# Patient Record
Sex: FEMALE | Race: BLACK OR AFRICAN AMERICAN | NOT HISPANIC OR LATINO | Employment: UNEMPLOYED | URBAN - METROPOLITAN AREA
[De-identification: names, ages, dates, MRNs, and addresses within clinical notes are randomized per-mention and may not be internally consistent; named-entity substitution may affect disease eponyms.]

---

## 2017-09-14 ENCOUNTER — OFFICE VISIT (OUTPATIENT)
Dept: OBSTETRICS AND GYNECOLOGY | Facility: CLINIC | Age: 22
End: 2017-09-14
Payer: MEDICAID

## 2017-09-14 VITALS
HEIGHT: 69 IN | SYSTOLIC BLOOD PRESSURE: 128 MMHG | BODY MASS INDEX: 30.94 KG/M2 | DIASTOLIC BLOOD PRESSURE: 64 MMHG | WEIGHT: 208.88 LBS

## 2017-09-14 DIAGNOSIS — O09.32: Primary | ICD-10-CM

## 2017-09-14 DIAGNOSIS — A74.9 CHLAMYDIA INFECTION DURING PREGNANCY: ICD-10-CM

## 2017-09-14 DIAGNOSIS — Z32.00 POSSIBLE PREGNANCY: ICD-10-CM

## 2017-09-14 DIAGNOSIS — N91.2 AMENORRHEA: ICD-10-CM

## 2017-09-14 DIAGNOSIS — B00.9 HSV INFECTION: ICD-10-CM

## 2017-09-14 DIAGNOSIS — O98.819 CHLAMYDIA INFECTION DURING PREGNANCY: ICD-10-CM

## 2017-09-14 PROBLEM — O09.30 NO PRENATAL CARE IN CURRENT PREGNANCY: Status: ACTIVE | Noted: 2017-09-14

## 2017-09-14 LAB
B-HCG UR QL: POSITIVE
CTP QC/QA: YES

## 2017-09-14 PROCEDURE — 88175 CYTOPATH C/V AUTO FLUID REDO: CPT

## 2017-09-14 PROCEDURE — 87591 N.GONORRHOEAE DNA AMP PROB: CPT

## 2017-09-14 PROCEDURE — 87088 URINE BACTERIA CULTURE: CPT

## 2017-09-14 PROCEDURE — 99203 OFFICE O/P NEW LOW 30 MIN: CPT | Mod: PBBFAC | Performed by: ADVANCED PRACTICE MIDWIFE

## 2017-09-14 PROCEDURE — 3008F BODY MASS INDEX DOCD: CPT | Mod: ,,, | Performed by: ADVANCED PRACTICE MIDWIFE

## 2017-09-14 PROCEDURE — 81025 URINE PREGNANCY TEST: CPT | Mod: PBBFAC | Performed by: ADVANCED PRACTICE MIDWIFE

## 2017-09-14 PROCEDURE — 99999 PR PBB SHADOW E&M-NEW PATIENT-LVL III: CPT | Mod: PBBFAC,,, | Performed by: ADVANCED PRACTICE MIDWIFE

## 2017-09-14 PROCEDURE — 87086 URINE CULTURE/COLONY COUNT: CPT

## 2017-09-14 PROCEDURE — 87147 CULTURE TYPE IMMUNOLOGIC: CPT

## 2017-09-14 PROCEDURE — 99204 OFFICE O/P NEW MOD 45 MIN: CPT | Mod: TH,S$PBB,, | Performed by: ADVANCED PRACTICE MIDWIFE

## 2017-09-14 NOTE — PROGRESS NOTES
Stephy Spaulding is a 21 y.o. , presents today for amenorrhea.    Has not seen any other provider for this possible pregnancy. She suspects she is around 30 weeks gestation with a possible EDC of 17.  Denies VB, LOF or ctx during this pregnancy. Last outbreak ?      C/C: amenorrhea    HPI: Reports amenorrhea since No LMP recorded..Does not know LMP  States she has been in a state of denial regarding this pregnancy until recently.2 Prior to LMP, menses were  regular occuring every 28 days.  She is not currently on any contraception. + UPT on POS. Also reports nausea occas but not daily vomiting. No  breast tenderness  Breastfeeding toddler. Denies vaginal bleeding since LMP.    Reports no long-term chronic medical conditions     Review of patient's allergies indicates:  No Known Allergies  Past Medical History:   Diagnosis Date    Anemia     since HS  occas    Asthma     childhood    Herpes simplex virus (HSV) infection     found out with last pregnancy     No past surgical history on file.  No past surgical history on file.  OB History    Para Term  AB Living   2 1 1     1   SAB TAB Ectopic Multiple Live Births                  # Outcome Date GA Lbr Dayron/2nd Weight Sex Delivery Anes PTL Lv   2 Term 16 41w0d  3.033 kg (6 lb 11 oz) F Vag-Spont EPI N    1                  OB History      Para Term  AB Living    2 1 1     1    SAB TAB Ectopic Multiple Live Births                     Social History     Social History    Marital status: Single     Spouse name: N/A    Number of children: N/A    Years of education: N/A     Occupational History    Not on file.     Social History Main Topics    Smoking status: Former Smoker     Types: Cigarettes    Smokeless tobacco: Never Used    Alcohol use No      Comment: not with pregnancy    Drug use: No    Sexual activity: Yes     Partners: Male     Other Topics Concern    Not on file     Social History Narrative    No  "narrative on file     Family History   Problem Relation Age of Onset    Emphysema Paternal Grandfather     Restless legs syndrome Maternal Grandmother     Restless legs syndrome Mother     Sickle cell trait Sister      History   Sexual Activity    Sexual activity: Yes    Partners: Male       GENETIC SCREENING   Patient's age 35 years or older as of estimated date of delivery? N  Nural tube defect (meningomyelocele, spina bifida, or anencephaly)? N  Down syndrome? N  Kenny-Sachs (Ashkenazi Adventist, Cajun, Kiswahili [place])? N  Canavan disease (Ashkenazi Adventist)? N  Familial dysautonomia (Ashkenazi Adventist)? N  Sickle cell disease or trait ()? sISTER HAS ss TRAIT  Hemophilia or other blood disorders? N  Cystic fibrosis? N  Muscular dystrophy? N  Lydia's chorea? N  Thalassemia (Italian, Greek, Mediterranean, or  background) MCV less than 80? N  Congenital heart defect? N  Mental retardation/autism? N   If Yes, was person tested for Fragile X? N  Other inherited genetic or chromosomal disorder? Distant Cousin is "little Person"  Maternal metabolic disorder (e.g. type 1 diabetes, PKU)? N  Patient or baby's father had a child with birth defects not listed above? N  Recurrent pregnancy loss or a stillbirth: N  Medications (including supplements, vitamins, herbs or OTC drugs)/illicit/recreational drugs/alcohol since last menstrual period? Bee Pollen folic acid    If yes, agent(s) and strength/dose: n  List any other genetic risks: n  Comments/counseling: N    INFECTION HISTORY  Live with someone with TB or exposed to TB: N  Patient or partner has history of genital herpes: PT   Rash or viral illness since last menstrual period: N  Patient or partner has hepatitis B or C: N  History of STD, gonorrhea, chlamydia, HPV, HIV, syphilis (list all that apply): N  List other infections: N  Additional comments: N         ROS:  WNL  Constitutional/Gen: Denies fevers, chills, malaise, or weight loss or fatigue "   Psych: Denies depression, anxiety  Eyes: Denies changes in vision or scotomata  Ears, nose, mouth, throat: Denies sinus tenderness, swelling, or dentition problems  CV/vasc: Denies heart palpitations or edema  Resp: Denies SOB or dyspnea  Breasts: Denies mass, nipple discharge, or trauma. NO breast tenderness. Lactating  Breastfeeding 3yo  GI: Denies constipation, diarrhea, or vomiting. occa nausea.  : Denies vaginal discharge, dysuria or pelvic pain. no urinary frequency  MS: Denies weakness, soreness, or changes in ROM    OBJECTIVE:  General Appearance:    Alert, cooperative, no distress, appears stated age   Head:    Normocephalic, without obvious abnormality, atraumatic   Eyes:    PERRL, conjunctiva/corneas clear both eyes   Ears:    Normal TM's and external ear canals, both ears   Nose:   Nares normal, septum midline, mucosa normal, no drainage    or sinus tenderness   Throat:   Lips, mucosa, and tongue normal; teeth and gums normal   Neck:   Supple, symmetrical, trachea midline, no adenopathy;     thyroid:  no enlargement/tenderness/nodules   Back:     Symm etric, no curvature, ROM normal, no CVA tenderness   Lungs:     Clear to auscultation bilaterally, respirations unlabored   Chest Wall:    No tenderness or deformity    Heart:    Regular rate and rhythm, S1 and S2 normal, no murmur, rub   or gallop   Breast Exam:    No tenderness, masses, or nipple abnormality lactating   Abdomen:     Soft, non-tender, bowel sounds active all four quadrants,     no masses, no organomegaly +FM   Genitalia:    Normal female without lesion, discharge or tenderness                               Cervix:   Long, closed, non-tender   Uterus:   Enlargement compatible with 30 weeks gestation; regular, mobile and nontende   Adnexa:   Non-tender, without masses bilaterally   Cervix  LTC         Extremities:   Extremities normal, atraumatic, no cyanosis or edema   Pulses:   2+ and symmetric all extremities   Skin:   Skin color,  texture, turgor normal, no rashes or lesions   Lymph nodes:   Cervical WNL   Neurologic:   Normal        UPT POS in office    ASSESSMENT:  21 y.o. female  with amenorrhea  Likely at 30w0d via leopold maneuvers and pt report; S?D unsure dates  Body mass index is 30.85 kg/m².  There is no problem list on file for this patient.    Genetic screening offered and too late  CF offered and declines    PLAN:  Initial OB labs ordered.  Dating ultrasound ordered.  NT or MT21 ordered.  Prenatal vitamins ordered.  Pregnancy education and couseling; handouts and booklet provided and reviewed.  -- She has has not  attended meet and greet and has note toured facility  She will tour [facility] visit  -- Oriented to practice and anticipated prenatal course of care  -- Precautions/warning signs and how to reach us reviewed  -- Common complaints of pregnancy  -- Routine prenatal labs including HIV SS ordered     PAP, Gc/chlam collected  -- Ultrasound scheduled with M  -- Childbirth education/hospital/[facility] facilities  -- Nutrition and food safety  -- Sexual activity and exercise  -- Environmental/Work hazards  -- Travel  -- Tobacco (Ask, Advise, Assess, Assist, and Arrange), as well as alcohol and drug use  N/A  -- Use of any medications (Including supplements, Vitamins, Herbs, or OTC Drugs)  Will start PNvitamins    RTC x 2 weeks, call or present sooner prn.   Joyce Lees CNM

## 2017-09-15 ENCOUNTER — LAB VISIT (OUTPATIENT)
Dept: LAB | Facility: OTHER | Age: 22
End: 2017-09-15
Attending: ADVANCED PRACTICE MIDWIFE
Payer: MEDICAID

## 2017-09-15 DIAGNOSIS — O09.32: ICD-10-CM

## 2017-09-15 DIAGNOSIS — Z32.00 POSSIBLE PREGNANCY: ICD-10-CM

## 2017-09-15 LAB
ABO + RH BLD: NORMAL
ANISOCYTOSIS BLD QL SMEAR: SLIGHT
BASOPHILS # BLD AUTO: 0.01 K/UL
BASOPHILS NFR BLD: 0.1 %
BLD GP AB SCN CELLS X3 SERPL QL: NORMAL
C TRACH DNA SPEC QL NAA+PROBE: DETECTED
DIFFERENTIAL METHOD: ABNORMAL
EOSINOPHIL # BLD AUTO: 0.1 K/UL
EOSINOPHIL NFR BLD: 0.9 %
ERYTHROCYTE [DISTWIDTH] IN BLOOD BY AUTOMATED COUNT: 16.2 %
GIANT PLATELETS BLD QL SMEAR: PRESENT
GLUCOSE SERPL-MCNC: 87 MG/DL
HCT VFR BLD AUTO: 30.4 %
HGB BLD-MCNC: 9.6 G/DL
HGB S BLD QL SOLY: NEGATIVE
HYPOCHROMIA BLD QL SMEAR: ABNORMAL
LYMPHOCYTES # BLD AUTO: 2.3 K/UL
LYMPHOCYTES NFR BLD: 23.6 %
MCH RBC QN AUTO: 21.3 PG
MCHC RBC AUTO-ENTMCNC: 31.6 G/DL
MCV RBC AUTO: 68 FL
MONOCYTES # BLD AUTO: 0.5 K/UL
MONOCYTES NFR BLD: 4.8 %
N GONORRHOEA DNA SPEC QL NAA+PROBE: NOT DETECTED
NEUTROPHILS # BLD AUTO: 7 K/UL
NEUTROPHILS NFR BLD: 70.6 %
OVALOCYTES BLD QL SMEAR: ABNORMAL
PLATELET # BLD AUTO: 359 K/UL
PLATELET BLD QL SMEAR: ABNORMAL
PMV BLD AUTO: 9.4 FL
POIKILOCYTOSIS BLD QL SMEAR: ABNORMAL
POLYCHROMASIA BLD QL SMEAR: ABNORMAL
RBC # BLD AUTO: 4.5 M/UL
SPHEROCYTES BLD QL SMEAR: ABNORMAL
WBC # BLD AUTO: 9.9 K/UL

## 2017-09-15 PROCEDURE — 86900 BLOOD TYPING SEROLOGIC ABO: CPT

## 2017-09-15 PROCEDURE — 82950 GLUCOSE TEST: CPT

## 2017-09-15 PROCEDURE — 87340 HEPATITIS B SURFACE AG IA: CPT

## 2017-09-15 PROCEDURE — 85025 COMPLETE CBC W/AUTO DIFF WBC: CPT

## 2017-09-15 PROCEDURE — 86592 SYPHILIS TEST NON-TREP QUAL: CPT

## 2017-09-15 PROCEDURE — 36415 COLL VENOUS BLD VENIPUNCTURE: CPT

## 2017-09-15 PROCEDURE — 86762 RUBELLA ANTIBODY: CPT

## 2017-09-15 PROCEDURE — 86703 HIV-1/HIV-2 1 RESULT ANTBDY: CPT

## 2017-09-15 PROCEDURE — 86901 BLOOD TYPING SEROLOGIC RH(D): CPT

## 2017-09-15 PROCEDURE — 85660 RBC SICKLE CELL TEST: CPT

## 2017-09-16 LAB — BACTERIA UR CULT: NORMAL

## 2017-09-18 LAB
HBV SURFACE AG SERPL QL IA: NEGATIVE
HIV 1+2 AB+HIV1 P24 AG SERPL QL IA: NEGATIVE
RPR SER QL: NORMAL
RUBV IGG SER-ACNC: 85.1 IU/ML
RUBV IGG SER-IMP: REACTIVE

## 2017-09-21 ENCOUNTER — TELEPHONE (OUTPATIENT)
Dept: OBSTETRICS AND GYNECOLOGY | Facility: CLINIC | Age: 22
End: 2017-09-21

## 2017-09-21 PROBLEM — A74.9 CHLAMYDIA INFECTION DURING PREGNANCY: Status: ACTIVE | Noted: 2017-09-21

## 2017-09-21 PROBLEM — O98.819 CHLAMYDIA INFECTION DURING PREGNANCY: Status: ACTIVE | Noted: 2017-09-21

## 2017-09-21 NOTE — TELEPHONE ENCOUNTER
TC to Pt  Informed of POS Chlamydia  RX called to pharmacy  Instructed to take half and give  half.  agrees

## 2017-09-25 ENCOUNTER — ROUTINE PRENATAL (OUTPATIENT)
Dept: OBSTETRICS AND GYNECOLOGY | Facility: CLINIC | Age: 22
End: 2017-09-25
Payer: MEDICAID

## 2017-09-25 VITALS — SYSTOLIC BLOOD PRESSURE: 120 MMHG | WEIGHT: 209 LBS | BODY MASS INDEX: 30.86 KG/M2 | DIASTOLIC BLOOD PRESSURE: 70 MMHG

## 2017-09-25 DIAGNOSIS — Z34.00: Primary | ICD-10-CM

## 2017-09-25 DIAGNOSIS — O99.019 ANEMIA IN PREG-UNSPEC: ICD-10-CM

## 2017-09-25 DIAGNOSIS — Z78.9 BREASTFEEDING (INFANT): ICD-10-CM

## 2017-09-25 DIAGNOSIS — A60.9 HSV (HERPES SIMPLEX VIRUS) ANOGENITAL INFECTION: Primary | ICD-10-CM

## 2017-09-25 DIAGNOSIS — O23.43 GBS (GROUP B STREPTOCOCCUS) UTI COMPLICATING PREGNANCY, THIRD TRIMESTER: ICD-10-CM

## 2017-09-25 DIAGNOSIS — B95.1 GBS (GROUP B STREPTOCOCCUS) UTI COMPLICATING PREGNANCY, THIRD TRIMESTER: ICD-10-CM

## 2017-09-25 PROBLEM — O23.40 GBS (GROUP B STREPTOCOCCUS) UTI COMPLICATING PREGNANCY: Status: ACTIVE | Noted: 2017-09-25

## 2017-09-25 PROBLEM — R79.89 ELEVATED PLATELET COUNT: Status: ACTIVE | Noted: 2017-09-25

## 2017-09-25 PROCEDURE — 99999 PR PBB SHADOW E&M-EST. PATIENT-LVL II: CPT | Mod: PBBFAC,,, | Performed by: ADVANCED PRACTICE MIDWIFE

## 2017-09-25 PROCEDURE — 99212 OFFICE O/P EST SF 10 MIN: CPT | Mod: PBBFAC | Performed by: ADVANCED PRACTICE MIDWIFE

## 2017-09-25 PROCEDURE — 3008F BODY MASS INDEX DOCD: CPT | Mod: ,,, | Performed by: ADVANCED PRACTICE MIDWIFE

## 2017-09-25 PROCEDURE — 99212 OFFICE O/P EST SF 10 MIN: CPT | Mod: TH,S$PBB,, | Performed by: ADVANCED PRACTICE MIDWIFE

## 2017-09-25 RX ORDER — FERROUS SULFATE 325(65) MG
325 TABLET ORAL DAILY
Qty: 30 TABLET | Refills: 3 | Status: SHIPPED | OUTPATIENT
Start: 2017-09-25 | End: 2018-09-25

## 2017-09-25 RX ORDER — VALACYCLOVIR HYDROCHLORIDE 500 MG/1
500 TABLET, FILM COATED ORAL 2 TIMES DAILY
Qty: 10 TABLET | Refills: 3 | Status: SHIPPED | OUTPATIENT
Start: 2017-09-25 | End: 2020-01-08 | Stop reason: SDUPTHER

## 2017-09-25 RX ORDER — AMOXICILLIN 500 MG/1
500 CAPSULE ORAL EVERY 12 HOURS
Qty: 14 CAPSULE | Refills: 0 | Status: SHIPPED | OUTPATIENT
Start: 2017-09-25 | End: 2017-10-02

## 2017-09-26 NOTE — PROGRESS NOTES
"21 y.o. female  at 31wks (poor dating/late prenatal care)  Reports + FM, denies VB, LOF or CTX  Brings her daughter John today, still breastfeeding her  Poor dating  -- Uncertain of LMP; "best guess is middle of February"  -- Ensured she has US with MFM asap; encouraged to keep appt  Late prenatal care  -- First visit  at ? 29 wks  + CT  -- States got rx from Ankita Lees CNM and completed it about 3-4 days ago  -- States she informed her parter Eric and encouraged him to get tested/get treated  -- Encouraged her to abstain from any sexual activity until both partners test negative  -- Reviewed maternal and fetal risks of untreated infection  -- Reviewed that we would perform a SHAE in ~ 4 weeks from completion of treatment and again at ~ 36 wks   -- Stephy states understanding and agrees  Excessive weight gain  -- TWG of 44# for pre preg BMI of 24  -- Reviewed with her to make sure pre preg weight was accurate  GBS uria  -- [place] count > 100,000   -- Reviewed UpToDate; she is candidate for treatment now; rx amoxicillin   -- Will need urine SHAE  -- Will need intrapartum abx  -- Reviewed all of this with Stephy; she states understanding and agrees  HSV  -- No current sx  -- Provided with prn rx of valtrex  -- Reviewed starting suppressive meds at 36 wks; she states understanding and agrees  Anemia   -- Fe supplement provided; encouraged Fe rich foods    Reviewed lab results (AB POS)  Declines tdap and flu vaccines today  Multiple issues - will bring to next TRAVIS/MD mtg for discussion  Reviewed warning signs, normal FKCs,  labor precautions and how/when to call.  RTC x 2 wks, call or present sooner prn.       "

## 2017-10-03 ENCOUNTER — OFFICE VISIT (OUTPATIENT)
Dept: MATERNAL FETAL MEDICINE | Facility: CLINIC | Age: 22
End: 2017-10-03
Payer: MEDICAID

## 2017-10-03 DIAGNOSIS — Z36.89 ENCOUNTER FOR FETAL ANATOMIC SURVEY: Primary | ICD-10-CM

## 2017-10-03 DIAGNOSIS — O09.33 LATE PRENATAL CARE AFFECTING PREGNANCY IN THIRD TRIMESTER: ICD-10-CM

## 2017-10-03 DIAGNOSIS — Z36.89 ENCOUNTER FOR ULTRASOUND TO CHECK FETAL GROWTH: ICD-10-CM

## 2017-10-03 PROCEDURE — 76805 OB US >/= 14 WKS SNGL FETUS: CPT | Mod: PBBFAC | Performed by: OBSTETRICS & GYNECOLOGY

## 2017-10-03 PROCEDURE — 76805 OB US >/= 14 WKS SNGL FETUS: CPT | Mod: 26,S$PBB,, | Performed by: OBSTETRICS & GYNECOLOGY

## 2017-10-03 PROCEDURE — 99499 UNLISTED E&M SERVICE: CPT | Mod: S$PBB,,, | Performed by: OBSTETRICS & GYNECOLOGY

## 2017-10-03 NOTE — PROGRESS NOTES
OB Ultrasound Report (see PDF version under imaging tab)    Indication  ========    Fetal anatomy survey.    Method  ======    Transabdominal ultrasound examination.    Pregnancy  =========    Lacy pregnancy. Number of fetuses: 1.    Dating  ======    LMP on: 2/18/2017  Cycle: regular cycle  GA by LMP 32 w + 3 d  HAMZAH by LMP: 11/25/2017  Ultrasound examination on: 10/3/2017  GA by U/S based upon: AC, BPD, Femur, HC  GA by U/S 34 w + 1 d  HAMZAH by U/S: 11/13/2017  Assigned: Dating performed on 10/3/2017, based on the LMP  Assigned GA 32 w + 3 d  Assigned HAMZAH: 11/25/2017    General Evaluation  ===============    Cardiac activity: present.  bpm.  Fetal movements: visualized.  Presentation: cephalic.  Placenta: posterior.  Umbilical cord: normal, 3 vessel cord.  Amniotic fluid: normal amount.    Fetal Biometry  ===========    Fetal Biometry  BPD 86.5 mm 34w 6d Hadlock  .3 mm 36w 1d Mona  .0 mm 35w 1d Hadlock  .4 mm 31w 6d Hadlock  Femur 67.2 mm 34w 4d Hadlock  Humerus 58.4 mm 33w 6d Mona  EFW 2,163 g 40% Archie  Calculated by: Hadlock (BPD-HC-AC-FL)  EFW (lb) 4 lb  EFW (oz) 12 oz  Cephalic index 0.79  HC / AC 1.13  FL / BPD 0.78  FL / AC 0.24  MVP 4.6 cm   bpm  Head / Face / Neck   4.0 mm    Fetal Anatomy  ===========    Cranium: normal  Lateral ventricles: normal  Choroid plexus: normal  Midline falx: normal  Cavum septi pellucidi: suboptimal  Cerebellum: normal  Cisterna magna: normal  Rt lateral ventricle: normal  Lt lateral ventricle: normal  Rt choroid plexus: normal  Lt choroid plexus: normal  Lips: suboptimal  Profile: suboptimal  Nose: suboptimal  4-chamber view: normal  RVOT: normal  LVOT: suboptimal  Situs: normal  Aortic arch: normal  Cardiac position: normal  Cardiac axis: normal  Cardiac size: normal  Cardiac rhythm: normal  Rt lung: normal  Lt lung: normal  Diaphragm: normal  Cord insertion: suboptimal  Stomach: normal  Kidneys: normal  Bladder: normal  Abdom.  wall: appears normal  Abdom. cavity: normal  Rt kidney: normal  Lt kidney: normal  Cervical spine: normal  Thoracic spine: normal  Lumbar spine: normal  Sacral spine: normal  Arms: normal  Legs: normal  Rt arm: visualized  Lt arm: visualized  Rt hand: visualized  Lt hand: visualized  Rt leg: visualized  Lt leg: visualized  Rt foot: visualized  Lt foot: visualized  Position of hands: suboptimal  Position of feet: suboptimal  Gender: female  Wants to know gender: yes    Maternal Structures  ===============    Uterus / Cervix  Uterus: Normal  Ovaries / Tubes / Adnexa  Rt ovary: Not visualized  Lt ovary: Not visualized    Impression  =========    A ingram living IUP is identified. Fetal size is appropriate for menstrual dating. No fetal structural malformations are identified;  however, the anatomic survey is incomplete as noted above. The study is limited due to late gestation and fetal position. The patient will  be scheduled for a f/u exam to attempt to complete the anatomic survey. Placental location is normal without evidence of previa.

## 2017-10-09 ENCOUNTER — ROUTINE PRENATAL (OUTPATIENT)
Dept: OBSTETRICS AND GYNECOLOGY | Facility: CLINIC | Age: 22
End: 2017-10-09
Payer: MEDICAID

## 2017-10-09 VITALS
BODY MASS INDEX: 32.23 KG/M2 | SYSTOLIC BLOOD PRESSURE: 124 MMHG | DIASTOLIC BLOOD PRESSURE: 78 MMHG | WEIGHT: 218.25 LBS

## 2017-10-09 DIAGNOSIS — Z34.93 PRENATAL CARE IN THIRD TRIMESTER: Primary | ICD-10-CM

## 2017-10-09 DIAGNOSIS — R76.8 HSV-2 SEROPOSITIVE: ICD-10-CM

## 2017-10-09 DIAGNOSIS — O98.819 CHLAMYDIA INFECTION DURING PREGNANCY: ICD-10-CM

## 2017-10-09 DIAGNOSIS — A74.9 CHLAMYDIA INFECTION DURING PREGNANCY: ICD-10-CM

## 2017-10-09 PROCEDURE — 99999 PR PBB SHADOW E&M-EST. PATIENT-LVL II: CPT | Mod: PBBFAC,,, | Performed by: ADVANCED PRACTICE MIDWIFE

## 2017-10-09 PROCEDURE — 99213 OFFICE O/P EST LOW 20 MIN: CPT | Mod: TH,S$PBB,, | Performed by: ADVANCED PRACTICE MIDWIFE

## 2017-10-09 PROCEDURE — 99212 OFFICE O/P EST SF 10 MIN: CPT | Mod: PBBFAC | Performed by: ADVANCED PRACTICE MIDWIFE

## 2017-10-09 RX ORDER — AZITHROMYCIN 500 MG/1
TABLET, FILM COATED ORAL
Status: ON HOLD | COMMUNITY
Start: 2017-09-21 | End: 2017-11-24 | Stop reason: HOSPADM

## 2017-10-09 NOTE — PROGRESS NOTES
21 y.o. female  at 33w2d by   Reports + FM, denies VB, LOF or CTX  Doing well    TW lbs uncertain of pre-pregnant weight, discussed weight and diet. Unable to deliver in ABC if weight gain excessive.  Reviewed upcoming 36wk labs , CT, RPR, HIV   Discussed pos GBS and need for IP prophylaxis  Reviewed warning signs, normal FKCs,  labor precautions and how/when to call.  RTC x 2 wks, call or present sooner prn.   hsv seropositive only, no hx lesions.  Discussed limited evidence re: utility of serological testing or treatment based on that testing.  She will use Valtrex prophylacticaly as told previously that she needs to.

## 2017-10-14 ENCOUNTER — DOCUMENTATION ONLY (OUTPATIENT)
Dept: OBSTETRICS AND GYNECOLOGY | Facility: HOSPITAL | Age: 22
End: 2017-10-14

## 2017-10-14 PROBLEM — R63.5 EXCESSIVE WEIGHT GAIN: Status: ACTIVE | Noted: 2017-10-14

## 2017-10-15 NOTE — PROGRESS NOTES
Per TRAVIS/MD mtg 10/12/17    Late prenatal care (3T)  Excessive weight gain    Plan for delivery on L&D    Bruna Ramos CNM/NP  Certified Nurse Midwife/Nurse Practitioner  10/14/2017 7:36 PM

## 2017-10-26 ENCOUNTER — LAB VISIT (OUTPATIENT)
Dept: LAB | Facility: OTHER | Age: 22
End: 2017-10-26
Attending: ADVANCED PRACTICE MIDWIFE
Payer: MEDICAID

## 2017-10-26 ENCOUNTER — ROUTINE PRENATAL (OUTPATIENT)
Dept: OBSTETRICS AND GYNECOLOGY | Facility: CLINIC | Age: 22
End: 2017-10-26
Payer: MEDICAID

## 2017-10-26 VITALS — DIASTOLIC BLOOD PRESSURE: 80 MMHG | WEIGHT: 222.13 LBS | SYSTOLIC BLOOD PRESSURE: 116 MMHG | BODY MASS INDEX: 32.8 KG/M2

## 2017-10-26 DIAGNOSIS — A74.9 CHLAMYDIA INFECTION DURING PREGNANCY: ICD-10-CM

## 2017-10-26 DIAGNOSIS — Z34.93 PRENATAL CARE IN THIRD TRIMESTER: ICD-10-CM

## 2017-10-26 DIAGNOSIS — O98.819 CHLAMYDIA INFECTION DURING PREGNANCY: ICD-10-CM

## 2017-10-26 DIAGNOSIS — O23.43 GROUP B STREPTOCOCCUS URINARY TRACT INFECTION AFFECTING PREGNANCY IN THIRD TRIMESTER: Primary | ICD-10-CM

## 2017-10-26 DIAGNOSIS — B95.1 GROUP B STREPTOCOCCUS URINARY TRACT INFECTION AFFECTING PREGNANCY IN THIRD TRIMESTER: Primary | ICD-10-CM

## 2017-10-26 LAB
ACANTHOCYTES BLD QL SMEAR: PRESENT
ANISOCYTOSIS BLD QL SMEAR: SLIGHT
BASOPHILS # BLD AUTO: 0.02 K/UL
BASOPHILS NFR BLD: 0.2 %
DIFFERENTIAL METHOD: ABNORMAL
EOSINOPHIL # BLD AUTO: 0.1 K/UL
EOSINOPHIL NFR BLD: 0.6 %
ERYTHROCYTE [DISTWIDTH] IN BLOOD BY AUTOMATED COUNT: 16.7 %
GIANT PLATELETS BLD QL SMEAR: PRESENT
HCT VFR BLD AUTO: 30.5 %
HGB BLD-MCNC: 9.6 G/DL
LYMPHOCYTES # BLD AUTO: 2.7 K/UL
LYMPHOCYTES NFR BLD: 26.9 %
MCH RBC QN AUTO: 20.6 PG
MCHC RBC AUTO-ENTMCNC: 31.5 G/DL
MCV RBC AUTO: 66 FL
MONOCYTES # BLD AUTO: 0.5 K/UL
MONOCYTES NFR BLD: 5.3 %
NEUTROPHILS # BLD AUTO: 6.6 K/UL
NEUTROPHILS NFR BLD: 67 %
OVALOCYTES BLD QL SMEAR: ABNORMAL
PLATELET # BLD AUTO: 354 K/UL
PLATELET BLD QL SMEAR: ABNORMAL
PMV BLD AUTO: 9.3 FL
POIKILOCYTOSIS BLD QL SMEAR: SLIGHT
POLYCHROMASIA BLD QL SMEAR: ABNORMAL
RBC # BLD AUTO: 4.65 M/UL
SCHISTOCYTES BLD QL SMEAR: ABNORMAL
WBC # BLD AUTO: 9.94 K/UL

## 2017-10-26 PROCEDURE — 99213 OFFICE O/P EST LOW 20 MIN: CPT | Mod: TH,S$PBB,, | Performed by: ADVANCED PRACTICE MIDWIFE

## 2017-10-26 PROCEDURE — 99999 PR PBB SHADOW E&M-EST. PATIENT-LVL II: CPT | Mod: PBBFAC,,, | Performed by: ADVANCED PRACTICE MIDWIFE

## 2017-10-26 PROCEDURE — 36415 COLL VENOUS BLD VENIPUNCTURE: CPT

## 2017-10-26 PROCEDURE — 86703 HIV-1/HIV-2 1 RESULT ANTBDY: CPT

## 2017-10-26 PROCEDURE — 87086 URINE CULTURE/COLONY COUNT: CPT

## 2017-10-26 PROCEDURE — 99212 OFFICE O/P EST SF 10 MIN: CPT | Mod: PBBFAC | Performed by: ADVANCED PRACTICE MIDWIFE

## 2017-10-26 PROCEDURE — 86592 SYPHILIS TEST NON-TREP QUAL: CPT

## 2017-10-26 PROCEDURE — 87591 N.GONORRHOEAE DNA AMP PROB: CPT

## 2017-10-26 PROCEDURE — 85025 COMPLETE CBC W/AUTO DIFF WBC: CPT

## 2017-10-26 NOTE — PROGRESS NOTES
GBS bacteruria this pregnancy; she knows we will treat with IV antibiotics during labor. Was treated during pregnancy since the culture was >100,000 colony count but no SHAE on record. Will collect today.  SHAE CT today.  Planning on avoiding epidural. Discussed coping mechanisms during labor.  Showed me her eugene which documented a weight of 202# at about 5 weeks gestation with a visit to a clinic that confirmed her pregnancy only. States a [facility] nurse showed her how to use a eugene to access this information. This weight would give her a prepregnancy BMI of 29.8 and a TWG of 20#. She is still not eligible for [facility] care since she did not establish prenatal care until almost 30 weeks gestation. (This was clarified with the patient per phone conversation after she left her prenatal visit.)  To get blood work of CBC, RPR, and HIV today.  History seropositive HSV with no history of a lesion discussed. Valtrex not needed per our protocol and UpToDate.  Warning signs of 3T, S&S PTL and FMR protocol reviewed.  Expectation for hospital stay reviewed

## 2017-10-27 LAB
C TRACH DNA SPEC QL NAA+PROBE: NOT DETECTED
HIV 1+2 AB+HIV1 P24 AG SERPL QL IA: NEGATIVE
N GONORRHOEA DNA SPEC QL NAA+PROBE: NOT DETECTED
RPR SER QL: NORMAL

## 2017-10-28 LAB — BACTERIA UR CULT: NORMAL

## 2017-10-31 ENCOUNTER — OFFICE VISIT (OUTPATIENT)
Dept: MATERNAL FETAL MEDICINE | Facility: CLINIC | Age: 22
End: 2017-10-31
Payer: MEDICAID

## 2017-10-31 DIAGNOSIS — Z36.89 ENCOUNTER FOR ULTRASOUND TO CHECK FETAL GROWTH: ICD-10-CM

## 2017-10-31 PROCEDURE — 99499 UNLISTED E&M SERVICE: CPT | Mod: S$PBB,,, | Performed by: OBSTETRICS & GYNECOLOGY

## 2017-10-31 PROCEDURE — 76816 OB US FOLLOW-UP PER FETUS: CPT | Mod: PBBFAC | Performed by: OBSTETRICS & GYNECOLOGY

## 2017-10-31 PROCEDURE — 76816 OB US FOLLOW-UP PER FETUS: CPT | Mod: 26,S$PBB,, | Performed by: OBSTETRICS & GYNECOLOGY

## 2017-10-31 NOTE — PROGRESS NOTES
OB Ultrasound Report (see PDF version under imaging tab)    Indication  ========    Evaluation of fetal growth/finish anatomy.    Method  ======    Transabdominal ultrasound examination.    Pregnancy  =========    Lacy pregnancy. Number of fetuses: 1.    Dating  ======    LMP on: 2/18/2017  Cycle: regular cycle  GA by LMP 36 w + 3 d  HAMZAH by LMP: 11/25/2017  Ultrasound examination on: 10/31/2017  GA by U/S based upon: AC, BPD, Femur, HC  GA by U/S 37 w + 1 d  HAMZAH by U/S: 11/20/2017  Assigned: Dating performed on 10/3/2017, based on the LMP  Assigned GA 36 w + 3 d  Assigned HAMZAH: 11/25/2017    General Evaluation  ===============    Cardiac activity: present.  bpm.  Fetal movements: visualized.  Presentation: cephalic.  Placenta:  Placental site: posterior.  Umbilical cord: Cord vessels: 3 vessel cord.  Amniotic fluid: MVP 5.0 cm.    Fetal Biometry  ===========    Fetal Biometry  BPD 94.2 mm 38w 3d Hadlock  .9 mm 39w 0d Mona  .5 mm 38w 0d Hadlock  .1 mm 35w 4d Hadlock  Femur 72.0 mm 36w 6d Hadlock  EFW 2,942 g 42% Archie  Calculated by: Hadlock (BPD-HC-AC-FL)  EFW (lb) 6 lb  EFW (oz) 8 oz  Cephalic index 0.83  HC / AC 1.05  FL / BPD 0.76  FL / AC 0.23  MVP 5.0 cm   bpm    Fetal Anatomy  ===========    Cranium: normal  Cavum septi pellucidi: normal  Posterior fossa: normal  Lips: normal  Profile: suboptimal  Nose: normal  Profile: nasal bone seen  4-chamber view: documented previously  LVOT: suboptimal  Heart / Thorax: septum is sub opt  Cord insertion: suboptimal  Stomach: normal  Kidneys: normal  Bladder: normal  Rt foot: suboptimal  Lt foot: suboptimal  Position of feet: suboptimal  Gender: female  Wants to know gender: yes  Other: A full anatomy survey previously performed.    Impression  =========    The fetal anatomic survey remains limited as noted above due to fetal position and gestational age. Interval fetal growth has been  normal with the EFW plotting at the 42nd  percentile.    Recommendation  ==============    Repeat ultrasound study as clinically indicated.

## 2017-11-03 ENCOUNTER — TELEPHONE (OUTPATIENT)
Dept: OBSTETRICS AND GYNECOLOGY | Facility: HOSPITAL | Age: 22
End: 2017-11-03

## 2017-11-09 ENCOUNTER — ROUTINE PRENATAL (OUTPATIENT)
Dept: OBSTETRICS AND GYNECOLOGY | Facility: CLINIC | Age: 22
End: 2017-11-09
Payer: MEDICAID

## 2017-11-09 VITALS
WEIGHT: 228.31 LBS | DIASTOLIC BLOOD PRESSURE: 78 MMHG | SYSTOLIC BLOOD PRESSURE: 126 MMHG | BODY MASS INDEX: 33.71 KG/M2

## 2017-11-09 DIAGNOSIS — Z34.93 PRENATAL CARE IN THIRD TRIMESTER: Primary | ICD-10-CM

## 2017-11-09 PROCEDURE — 99999 PR PBB SHADOW E&M-EST. PATIENT-LVL II: CPT | Mod: PBBFAC,,, | Performed by: ADVANCED PRACTICE MIDWIFE

## 2017-11-09 PROCEDURE — 99212 OFFICE O/P EST SF 10 MIN: CPT | Mod: PBBFAC | Performed by: ADVANCED PRACTICE MIDWIFE

## 2017-11-09 PROCEDURE — 99212 OFFICE O/P EST SF 10 MIN: CPT | Mod: TH,S$PBB,, | Performed by: ADVANCED PRACTICE MIDWIFE

## 2017-11-09 NOTE — PROGRESS NOTES
21 y.o. female  at 37w5d by LMP   Reports + FM, denies VB, LOF or regular CTX  GBS POS - needs intrapartum abx  Anemia - taking Fe  Hx CT - recent SHAE negative  Hx UTI - recent SHAE negative  Repeat HIV/RPR neg/neg  Reviewed warning signs, normal FKCs, labor precautions and how/when to call.  RTC x 1 wks, call or present sooner prn.

## 2017-11-16 ENCOUNTER — ROUTINE PRENATAL (OUTPATIENT)
Dept: OBSTETRICS AND GYNECOLOGY | Facility: CLINIC | Age: 22
End: 2017-11-16
Payer: MEDICAID

## 2017-11-16 VITALS
SYSTOLIC BLOOD PRESSURE: 128 MMHG | WEIGHT: 229.25 LBS | DIASTOLIC BLOOD PRESSURE: 72 MMHG | BODY MASS INDEX: 33.86 KG/M2

## 2017-11-16 DIAGNOSIS — Z34.93 PRENATAL CARE IN THIRD TRIMESTER: Primary | ICD-10-CM

## 2017-11-16 PROCEDURE — 99213 OFFICE O/P EST LOW 20 MIN: CPT | Mod: TH,S$PBB,, | Performed by: ADVANCED PRACTICE MIDWIFE

## 2017-11-16 PROCEDURE — 99999 PR PBB SHADOW E&M-EST. PATIENT-LVL III: CPT | Mod: PBBFAC,,, | Performed by: ADVANCED PRACTICE MIDWIFE

## 2017-11-16 PROCEDURE — 99213 OFFICE O/P EST LOW 20 MIN: CPT | Mod: PBBFAC | Performed by: ADVANCED PRACTICE MIDWIFE

## 2017-11-16 NOTE — PROGRESS NOTES
21 y.o. female  at 38w5d   Doing well    TW lbs   Reviewed GBS pos  Reviewed repeat HIV/RPR neg  Taking Valtrex,  Reviewed warning signs, normal FKCs, labor precautions and how/when to call.  RTC x 1 wks, call or present sooner prn.

## 2017-11-23 ENCOUNTER — HOSPITAL ENCOUNTER (INPATIENT)
Facility: OTHER | Age: 22
LOS: 2 days | Discharge: HOME | End: 2017-11-25
Attending: OBSTETRICS & GYNECOLOGY | Admitting: OBSTETRICS & GYNECOLOGY
Payer: MEDICAID

## 2017-11-23 PROBLEM — O99.013 ANEMIA AFFECTING PREGNANCY IN THIRD TRIMESTER: Status: ACTIVE | Noted: 2017-09-25

## 2017-11-23 PROBLEM — R79.89 ELEVATED PLATELET COUNT: Status: RESOLVED | Noted: 2017-09-25 | Resolved: 2017-11-23

## 2017-11-23 LAB
ABO + RH BLD: NORMAL
BASOPHILS # BLD AUTO: 0.01 K/UL
BASOPHILS NFR BLD: 0.1 %
BLD GP AB SCN CELLS X3 SERPL QL: NORMAL
DIFFERENTIAL METHOD: ABNORMAL
EOSINOPHIL # BLD AUTO: 0.1 K/UL
EOSINOPHIL NFR BLD: 1 %
ERYTHROCYTE [DISTWIDTH] IN BLOOD BY AUTOMATED COUNT: 17.7 %
HCT VFR BLD AUTO: 30.3 %
HGB BLD-MCNC: 9.4 G/DL
LYMPHOCYTES # BLD AUTO: 3 K/UL
LYMPHOCYTES NFR BLD: 25.8 %
MCH RBC QN AUTO: 19.8 PG
MCHC RBC AUTO-ENTMCNC: 31 G/DL
MCV RBC AUTO: 64 FL
MONOCYTES # BLD AUTO: 0.7 K/UL
MONOCYTES NFR BLD: 5.6 %
NEUTROPHILS # BLD AUTO: 7.9 K/UL
NEUTROPHILS NFR BLD: 67.5 %
PLATELET # BLD AUTO: 340 K/UL
PMV BLD AUTO: 9.4 FL
RBC # BLD AUTO: 4.75 M/UL
WBC # BLD AUTO: 11.8 K/UL

## 2017-11-23 PROCEDURE — 25000003 PHARM REV CODE 250: Performed by: ADVANCED PRACTICE MIDWIFE

## 2017-11-23 PROCEDURE — 11000001 HC ACUTE MED/SURG PRIVATE ROOM

## 2017-11-23 PROCEDURE — 59409 OBSTETRICAL CARE: CPT | Mod: GB,,, | Performed by: ADVANCED PRACTICE MIDWIFE

## 2017-11-23 PROCEDURE — 72100002 HC LABOR CARE, 1ST 8 HOURS

## 2017-11-23 PROCEDURE — 86900 BLOOD TYPING SEROLOGIC ABO: CPT

## 2017-11-23 PROCEDURE — 72200004 HC VAGINAL DELIVERY LEVEL I

## 2017-11-23 PROCEDURE — 85025 COMPLETE CBC W/AUTO DIFF WBC: CPT

## 2017-11-23 PROCEDURE — 86850 RBC ANTIBODY SCREEN: CPT

## 2017-11-23 PROCEDURE — 99285 EMERGENCY DEPT VISIT HI MDM: CPT

## 2017-11-23 PROCEDURE — 99283 EMERGENCY DEPT VISIT LOW MDM: CPT | Mod: ,,, | Performed by: OBSTETRICS & GYNECOLOGY

## 2017-11-23 PROCEDURE — 63600175 PHARM REV CODE 636 W HCPCS: Performed by: ADVANCED PRACTICE MIDWIFE

## 2017-11-23 RX ORDER — METHYLERGONOVINE MALEATE 0.2 MG/ML
200 INJECTION INTRAVENOUS
Status: DISCONTINUED | OUTPATIENT
Start: 2017-11-23 | End: 2017-11-24

## 2017-11-23 RX ORDER — OXYTOCIN/RINGER'S LACTATE 20/1000 ML
41.65 PLASTIC BAG, INJECTION (ML) INTRAVENOUS CONTINUOUS
Status: DISPENSED | OUTPATIENT
Start: 2017-11-23 | End: 2017-11-23

## 2017-11-23 RX ORDER — OXYTOCIN/RINGER'S LACTATE 20/1000 ML
2 PLASTIC BAG, INJECTION (ML) INTRAVENOUS CONTINUOUS
Status: DISCONTINUED | OUTPATIENT
Start: 2017-11-23 | End: 2017-11-24

## 2017-11-23 RX ORDER — PRENATAL WITH FERROUS FUM AND FOLIC ACID 3080; 920; 120; 400; 22; 1.84; 3; 20; 10; 1; 12; 200; 27; 25; 2 [IU]/1; [IU]/1; MG/1; [IU]/1; MG/1; MG/1; MG/1; MG/1; MG/1; MG/1; UG/1; MG/1; MG/1; MG/1; MG/1
1 TABLET ORAL DAILY
Status: CANCELLED | OUTPATIENT
Start: 2017-11-23

## 2017-11-23 RX ORDER — MISOPROSTOL 200 UG/1
200 TABLET ORAL
Status: DISCONTINUED | OUTPATIENT
Start: 2017-11-23 | End: 2017-11-24

## 2017-11-23 RX ORDER — ONDANSETRON 8 MG/1
8 TABLET, ORALLY DISINTEGRATING ORAL EVERY 8 HOURS PRN
Status: DISCONTINUED | OUTPATIENT
Start: 2017-11-23 | End: 2017-11-24

## 2017-11-23 RX ORDER — NAPROXEN 500 MG/1
500 TABLET ORAL EVERY 8 HOURS PRN
Status: DISCONTINUED | OUTPATIENT
Start: 2017-11-23 | End: 2017-11-25 | Stop reason: HOSPADM

## 2017-11-23 RX ORDER — SODIUM CHLORIDE, SODIUM LACTATE, POTASSIUM CHLORIDE, CALCIUM CHLORIDE 600; 310; 30; 20 MG/100ML; MG/100ML; MG/100ML; MG/100ML
INJECTION, SOLUTION INTRAVENOUS CONTINUOUS
Status: DISCONTINUED | OUTPATIENT
Start: 2017-11-23 | End: 2017-11-24

## 2017-11-23 RX ORDER — CARBOPROST TROMETHAMINE 250 UG/ML
250 INJECTION, SOLUTION INTRAMUSCULAR
Status: DISCONTINUED | OUTPATIENT
Start: 2017-11-23 | End: 2017-11-24

## 2017-11-23 RX ORDER — OXYTOCIN/RINGER'S LACTATE 20/1000 ML
41.65 PLASTIC BAG, INJECTION (ML) INTRAVENOUS CONTINUOUS
Status: ACTIVE | OUTPATIENT
Start: 2017-11-23 | End: 2017-11-23

## 2017-11-23 RX ADMIN — Medication 41.65 MILLI-UNITS/MIN: at 05:11

## 2017-11-23 RX ADMIN — Medication 5 MILLION UNITS: at 03:11

## 2017-11-23 RX ADMIN — NAPROXEN 500 MG: 500 TABLET ORAL at 12:11

## 2017-11-23 NOTE — PROGRESS NOTES
"LABOR NOTE    S:  Pt in tub, blowing and yelling, working through UCs.  Reports increased pressure and urge to defecate. Coping well and resting between UCs.  Grandmother, mother, and family, at tubside and supportive.     O: /65   Pulse 72   Temp 97.5 °F (36.4 °C) (Oral)   Resp 16   Ht 5' 9" (1.753 m)   Wt 104.3 kg (230 lb)   LMP 2017   SpO2 100%   Breastfeeding? Yes   BMI 33.97 kg/m²     GENERAL: Appropriate affect  NEURO: Alert, oriented, normal speech  ABDOMEN: Nontender, Fundus palpates soft between UC's.  FHT: Baseline 130, moderate BTBV.  Reassuring. (Intermittent monitoring with portable monitor)  CTX: q 2-4 minutes per palpation  SVE:       ASSESSMENT:   21 y.o.  IUP at 39w5d, FHT reassuring    Patient Active Problem List   Diagnosis    Late prenatal care - at approx 30 wks    HSV-2 seropositive    CT infection, SHAE at 36 weeks negative    GBS (group B streptococcus) UTI complicating pregnancy    Anemia affecting pregnancy in third trimester    Elevated platelet count    Still breastfeeding Kaii (1.5 yrs)    Excessive weight gain    Prenatal care in third trimester    Indication for care in labor or delivery         PLAN:  Continue close Maternal/Fetal monitoring  Anticipate  - discussed location of delivery between UCs and pt desires to remain in tub  RN at tubside with CNM  Recheck PRN      Uyen Ortiz CNM    "

## 2017-11-23 NOTE — SUBJECTIVE & OBJECTIVE
Interval History:  Stephy is a 21 y.o.  at 39w5d. She is doing well - breathing & swaying through UCs and coping well.  Desires to get in tub.    Objective:     Vital Signs (Most Recent):  Temp: 98 °F (36.7 °C) (17 023)  Pulse: 73 (17 023)  Resp: 16 (17)  BP: 128/74 (17 0235)  SpO2: 100 % (17) Vital Signs (24h Range):  Temp:  [98 °F (36.7 °C)] 98 °F (36.7 °C)  Pulse:  [73-81] 73  Resp:  [16] 16  SpO2:  [100 %] 100 %  BP: (128)/(74) 128/74     Weight: 104.3 kg (230 lb)  Body mass index is 33.97 kg/m².    FHT:  Baseline 145, moderate BTBV, no accels, no decels. Cat 1, reassuring  TOCO: Q 5 minutes    Cervical Exam:  Dilation:  7  Effacement:  80%  Station: -2  Presentation: Vertex     Significant Labs:  Lab Results   Component Value Date    GROUPTRH AB POS 09/15/2017    HEPBSAG Negative 09/15/2017       I have personallly reviewed all pertinent lab results from the last 24 hours.    Physical Exam   LABS  Blood Type AB POS   GBS: positive  Rubella: Immune  RPR: Non-reactive   HIV: Neg  HepB: neg  GC/CT: Negative SHAE at 36wks    Lab Results   Component Value Date    WBC 9.94 10/26/2017    HGB 9.6 (L) 10/26/2017    HCT 30.5 (L) 10/26/2017    MCV 66 (L) 10/26/2017     (H) 10/26/2017       PE: Reviewed nursing notes  Gen: A&O, NAD, speech appropriate, even and clear  Psych: normal mood and affect, appropriate to situation  CV: normal heart rate  Lung: normal resp effort  Abd: gravid, soft, nontender, no epigastric discomfort  Ext: min edema, nontender   EFW: 7lb 6oz via Leopold's  Pelvis: adequate for trial of labor  Spec exam: no lesions or sores to labia or perineum, vaginal mucosa pink and well ruggated, no lesions. No s/s HSV outbreak.  SVE: /-2, mid position, soft consistency   Cephalic, unable to determine position

## 2017-11-23 NOTE — HPI
Stephy Spaulding is a 21 y.o. female  at 39w5d with Estimated Date of Delivery: 17 based on LMP who presents to L&D c/o contractions since approx 1000 (>16hrs), which are becoming closer and more intense. Now approx mins apart and she is unable to speak through them, breathing with UCs. She reports + FM. Denies VB or LOF. Accompanied by her grandmother to L&D. Expecting a girl!    Last ate dinner at 2200, has been sipping water tonight as well.     Pregnancy has been c/b: Late prenatal care at approx 30w, HSV-2 seropositive (denies h/o outbreak, has been taking valtrex), chlamydia infection with neg SHAE, GBS UTI      Lab Results   Component Value Date    HGB 9.6 (L) 10/26/2017    HCT 30.5 (L) 10/26/2017    HEPBSAG Negative 09/15/2017    RPR Non-reactive 10/26/2017

## 2017-11-23 NOTE — LACTATION NOTE
11/23/17 1230   Maternal Infant Assessment   Breast Shape Right:;round   Breast Density Right:;soft   Areola Right:;elastic   Nipple(s) Right:;everted   Infant Assessment   Sucking Reflex present   Rooting Reflex present   Swallow Reflex present   LATCH Score   Latch 2-->grasps breast, tongue down, lips flanged, rhythmic sucking   Audible Swallowing 2-->spontaneous and intermittent (24 hrs old)   Type Of Nipple 2-->everted (after stimulation)   Comfort (Breast/Nipple) 2-->soft/nontender   Hold (Positioning) 1-->minimal assist, teach one side: mother does other, staff holds   Score (less than 7 for 2/more consecutive times, consult Lactation Consultant) 9   Maternal Infant Feeding   Maternal Emotional State relaxed   Infant Positioning side-lying   Signs of Milk Transfer audible swallow   Time Spent (min) 0-15 min   Latch Assistance yes   Feeding Infant   Feeding Readiness Cues rooting   Feeding Tolerance/Success coordinated suck;rooting;strong suck   Effective Latch During Feeding yes   Audible Swallow yes   Suck/Swallow Coordination present   Lactation Referrals   Lactation Consult Breastfeeding assessment;Initial assessment;Knowledge deficit   Lactation Interventions   Attachment Promotion breastfeeding assistance provided;counseling provided;skin-to-skin contact encouraged   Breastfeeding Assistance assisted with positioning;feeding cue recognition promoted;infant latch-on verified;infant suck/swallow verified;support offered   Maternal Breastfeeding Support lactation counseling provided;encouragement offered   Latch Promotion positioning assisted;infant moved to breast   Baby laying next to mother in her bed showing feeding cues. Encouraged mother to nurse baby on cue. Mother prefers to nurse baby in side lying position. Assisted with positioning and latch. Baby nurses well with audible swallows. Basics reviewed.

## 2017-11-23 NOTE — PLAN OF CARE
Problem: Patient Care Overview  Goal: Plan of Care Review  Outcome: Ongoing (interventions implemented as appropriate)  Patient is following basic breastfeeding education provided.

## 2017-11-23 NOTE — H&P
[facility]  Obstetrics  Labor Progress Note    Patient Name: Stephy Spaulding  MRN: [id number]  Admission Date: 2017  Hospital Length of Stay: 0 days  Attending Physician: Aníbal Gaona MD  Primary Care Provider: Stephanie Li MD    Subjective:     Principal Problem: Active labor at term  Stephy Spaulding is a 21 y.o. female  at 39w5d with Estimated Date of Delivery: 17 based on LMP who presents to L&D c/o contractions, onset yesterday morning at approx 1000.  Occurring every 2-5mins and increasing in intensity. She reports + FM.  Denies VB, LOF or regular UCs. Accompanied by her grandmother and mother to L&D. Expecting a girl!    Last ate dinner at 1000, has been sipping water this evening and chewing ice chips.     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    Lab Results   Component Value Date    HGB 9.6 (L) 10/26/2017    HCT 30.5 (L) 10/26/2017    HEPBSAG Negative 09/15/2017    RPR Non-reactive 10/26/2017       Hospital Course:  Stephy is a  at 39w 5d admitted to L&D in active labor.  Expecting a girl.  Plan treatment of GBS with IV PCN.    Interval History:  Stephy is a 21 y.o.  at 39w5d. She is doing well - breathing & swaying through UCs and coping well.  Desires to get in tub.    Objective:     Vital Signs (Most Recent):  Temp: 98 °F (36.7 °C) (17 023)  Pulse: 73 (17)  Resp: 16 (17)  BP: 128/74 (17)  SpO2: 100 % (17) Vital Signs (24h Range):  Temp:  [98 °F (36.7 °C)] 98 °F (36.7 °C)  Pulse:  [73-81] 73  Resp:  [16] 16  SpO2:  [100 %] 100 %  BP: (128)/(74) 128/74     Weight: 104.3 kg (230 lb)  Body mass index is 33.97 kg/m².    FHT:  Baseline 145, moderate BTBV, no accels, no decels. Cat 1, reassuring  TOCO: Q 5  minutes    Cervical Exam:  Dilation:  7  Effacement:  80%  Station: -2  Presentation: Vertex     Significant Labs:  Lab Results   Component Value Date    GROUPTRH AB POS 09/15/2017    HEPBSAG Negative 09/15/2017       I have personallly reviewed all pertinent lab results from the last 24 hours.    Physical Exam   LABS  Blood Type AB POS   GBS: positive  Rubella: Immune  RPR: Non-reactive   HIV: Neg  HepB: neg  GC/CT: Negative SHAE at 36wks    Lab Results   Component Value Date    WBC 9.94 10/26/2017    HGB 9.6 (L) 10/26/2017    HCT 30.5 (L) 10/26/2017    MCV 66 (L) 10/26/2017     (H) 10/26/2017       PE: Reviewed nursing notes  Gen: A&O, NAD, speech appropriate, even and clear  Psych: normal mood and affect, appropriate to situation  CV: normal heart rate  Lung: normal resp effort  Abd: gravid, soft, nontender, no epigastric discomfort  Ext: min edema, nontender   EFW: 7lb 6oz via Leopold's  Pelvis: adequate for trial of labor  Spec exam: no lesions or sores to labia or perineum, vaginal mucosa pink and well ruggated, no lesions. No s/s HSV outbreak.  SVE: 7/80/-2, mid position, soft consistency   Cephalic, unable to determine position          Assessment/Plan:     21 y.o. female  at 39w5d for:    Indication for care in labor or delivery    - Consents signed and to chart  - Admit to Labor and Delivery unit  - Draw CBC, T&S  - Recheck in 2hrs or PRN        Excessive weight gain    Plan for care on L&D unit        Anemia affecting pregnancy in third trimester    Has been taking iron  Admit CBC and T&S        GBS (group B streptococcus) UTI complicating pregnancy    Treatment per protocol with IV PCN G        CT infection, SHAE at 36 weeks negative    She and partner treated in pregnancy  Negative SHAE at 36wks  Notify Peds        HSV-2 seropositive    Has been taking Valtrex BID and denies any s/s outbreak  Negative speculum exam in OB ED              Uyen Ortiz CNM  Obstetrics  [facility]

## 2017-11-23 NOTE — ASSESSMENT & PLAN NOTE
- Consents signed and to chart  - Admit to Labor and Delivery unit  - Draw CBC, T&S  - Recheck in 2hrs or PRN

## 2017-11-23 NOTE — HPI
Stephy Spaulding is a 21 y.o. female  at 39w5d with Estimated Date of Delivery: 17 based on LMP who presents to L&D c/o contractions, onset yesterday morning at approx 1000.  Occurring every 2-5mins and increasing in intensity. She reports + FM.  Denies VB, LOF or regular UCs. Accompanied by her grandmother and mother to L&D. Expecting a girl!    Last ate dinner at 1000, has been sipping water this evening and chewing ice chips.     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    Lab Results   Component Value Date    HGB 9.6 (L) 10/26/2017    HCT 30.5 (L) 10/26/2017    HEPBSAG Negative 09/15/2017    RPR Non-reactive 10/26/2017

## 2017-11-23 NOTE — DISCHARGE INSTRUCTIONS
Breastfeeding Discharge Instructions       Feed the baby at the earliest sign of hunger or comfort  o Hands to mouth, sucking motions  o Rooting or searching for something to suck on  o Dont wait for crying - it is a sign of distress     The feedings may be 8-12 times per 24hrs and will not follow a schedule   Avoid pacifiers and bottles for the first 4 weeks   Alternate the breast you start the feeding with, or start with the breast that feels the fullest   Switch breasts when the baby takes himself off the breast or falls asleep   Keep offering breasts until the baby looks full, no longer gives hunger signs, and stays asleep when placed on his back in the crib   If the baby is sleepy and wont wake for a feeding, put the baby skin-to-skin dressed in a diaper against the mothers bare chest   Sleep near your baby   The baby should be positioned and latched on to the breast correctly  o Chest-to-chest, chin in the breast  o Babys lips are flipped outward  o Babys mouth is stretched open wide like a shout  o Babys sucking should feel like tugging to the mother  - The baby should be drinking at the breast:  o You should hear swallowing or gulping throughout the feeding  o You should see milk on the babys lips when he comes off the breast  o Your breasts should be softer when the baby is finished feeding  o The baby should look relaxed at the end of feedings  o After the 4th day and your milk is in:  o The babys poop should turn bright yellow and be loose, watery, and seedy  o The baby should have at least 3-4 poops the size of the palm of your hand per day  o The baby should have at least 5-6 wet diapers per day  o The urine should be light yellow in color  You should drink when you are thirsty and eat a healthy diet when you are    hungry.     Take naps to get the rest you need.   Take medications and/or drink alcohol only with permission of your obstetrician    or the babys pediatrician.  You can  also call the Infant Risk Center,   ([phone]), Monday-Friday, 8am-5pm Central time, to get the most   up-to-date evidence-based information on the use of medications during   pregnancy and breastfeeding.      The baby should be examined by a pediatrician at 3-5 days of age.  Once your   milk comes in, the baby should be gaining at least ½ - 1oz each day and should be back to birthweight no later than 10-14 days of age.          Community Resources    [facility] Breastfeeding Warmline: [phone]   Local [facility] clinics: provide incentives and breastpumps to eligible mothers  [employer] Leche [employer] (LLLI):  mother-to-mother support group website        www.[employer]  Local La Leche League mother-to-mother support groups:        www.EduKart        La Leche League [facility]   Dr. Vlad Benitez website for latch videos and general information:        www.breastfeedinginc.ca  Infant Risk Center is a call center that provides information about the safety of taking medications while breastfeeding.  Call 1-954.855.4729, M-F, 8am-5pm, CT.  International Lactation Consultant Association provides resources for assistance:        www.ilca.org  [facility] Breastfeeding Coalition provides informationand resources for parents  and the community    http://[employer]em[employer] Foundationastfeeding.org     Tona Parisi is a mom-to-mom support group:                             www.[employer].com//breastfeedng-support/  Partners for Healthy Babies:  6-946-564-BABY(5016)  C[employer] Lait: a breastfeeding support group for women of color, [phone]

## 2017-11-23 NOTE — ED PROVIDER NOTES
"Encounter Date: 2017       History     Chief Complaint   Patient presents with    Contractions     Ms. Spaulding is a 22yo  at 39.5wga with HAMZAH 2017 based on LMP who presents to L&D c/o ctx since approximately 1000 yesterday. They are becoming closer and more intense, now approximately 2-5min apart. Reports good fetal movement. Denies VB, LOF.   Pregnancy complicated by late prenatal care, HSV2 seropositive on Valtrex, CT during pregnancy (neg SHAE), GBS UTI, anemia.          Review of patient's allergies indicates:  No Known Allergies  Past Medical History:   Diagnosis Date    Asthma     as child only    Herpes simplex virus (HSV) infection     Has never had a lesion. States she had a "positive blood test" during her first pregnancy,? type     Past Surgical History:   Procedure Laterality Date    NO PAST SURGERIES       Family History   Problem Relation Age of Onset    Emphysema Paternal Grandfather     Restless legs syndrome Maternal Grandmother     Restless legs syndrome Mother     Sickle cell trait Sister     Breast cancer Neg Hx     Colon cancer Neg Hx     Ovarian cancer Neg Hx      Social History   Substance Use Topics    Smoking status: Former Smoker     Types: Cigarettes    Smokeless tobacco: Never Used    Alcohol use No      Comment: not with pregnancy     Review of Systems   All other systems reviewed and are negative.      Physical Exam     Initial Vitals   BP Pulse Resp Temp SpO2   -- -- -- -- --      MAP       --       /65   Pulse 72   Temp 97.5 °F (36.4 °C) (Oral)   Resp 16   Ht 5' 9" (1.753 m)   Wt 104.3 kg (230 lb)   LMP 2017   SpO2 100%   Breastfeeding? Yes   BMI 33.97 kg/m²       Physical Exam    Constitutional: She appears well-developed and well-nourished.   Cardiovascular: Normal rate.   Pulmonary/Chest: No respiratory distress.   Abdominal: Soft.   Genitourinary:   Genitourinary Comments: Cephalic. EFW 7.5lb by Leopolds.   Skin: Skin is warm and " dry.     SVE: 7/80/-2, cephalic  FHTs: 150s  ED Course   Procedures  Labs Reviewed   CBC W/ AUTO DIFFERENTIAL   TYPE & SCREEN             Medical Decision Making:   Initial Assessment:   Labor       APC / Resident Notes:   Admit to L&D              ED Course      Clinical Impression:   The encounter diagnosis was Indication for care in labor or delivery.    Disposition:   Disposition: Admitted         Marcelina Lin MD  PGY-3 OB/GYN  [phone]                 Marcelina Lin MD  Resident  11/23/17 0308  The patient has been seen and examined by me, and I agree with the Resident assessment and plan as above. Patient stable for transfer to L&D. CNM to ok to manage.

## 2017-11-23 NOTE — HOSPITAL COURSE
2017: Stephy is a  at 39w 5d admitted to L&D in active labor.  Expecting a girl.  Plan treatment of GBS with IV PCN.   at 0526 in tub, bilateral labial and perineal abrasions hemostatic and no need for repair. EBL: 180mL  17 d/c home    17  Discharge home

## 2017-11-23 NOTE — L&D DELIVERY NOTE
Spontaneous vaginal delivery of viable female infant at 0526 in the tub - TINY and en caule. Vigorous infant lifted immediately from water by CNM and mother, to maternal chest and membranes swept off of infant's face.  Nuchal x2, body & arm cord x1 reduced, keeping infant's head above water.  Delayed cord clamping until pulsations stopped, then cord clamped and cut and infant handed to waiting nursery staff.  Meconium stained vernix and oral secretions noted at this time per trans nurse.  Mother assisted out of tub to bed per RN and CNM.   Apgars 9/9, wt: 8lb 7oz.  Placenta delivered spontaneously with gentle cord traction, and intact via Naidu, 3 vessel cord, at 0537. Fundus firm, uterus intact.  Bilateral labial abrasions and perineal abrasion noted to be hemostatic, no repair.   EBL 180mL.    Pitocin 20U in LR 1000mL infusing.   Mom and baby stable, with baby skin to skin and family bonding well.    Uyen Ortiz CNM     Delivery Information for  Tai Spaulding    Birth information:  YOB: 2017   Time of birth: 5:26 AM   Sex: female   Head Delivery Date/Time: 2017  5:26 AM   Delivery type: Vaginal, Spontaneous Delivery   Gestational Age: 39w5d    Delivery Providers    Delivering clinician:  Uyen Ortiz CNM   Other personnel:   Provider Role   Luba Martinez, ADRIANNA Delivery Nurse   Shyla Gipson, ADRIANNA Charge Nurse               Waterman Measurements    Weight:  3827 g Length:  50.8 cm   Head circum.:  34.3 cm Chest circum.:  33 cm           Assessment    Living status:  Living  Apgars:     1 Minute:   5 Minute:   10 Minute:   15 Minute:   20 Minute:     Skin Color:   1  1       Heart Rate:   2  2       Reflex Irritability:   2  2       Muscle Tone:   2  2       Respiratory Effort:   2  2       Total:   9  9               Apgars Assigned By:  GEOVANNI MARTINEZ RN         Assisted Delivery Details:    Forceps attempted?:  No  Vacuum extractor attempted?:  No         Shoulder Dystocia     Shoulder dystocia present?:  No           Presentation and Position    Presentation:   Vertex   Position:   Left    Occiput    Anterior            Interventions/Resuscitation    Method:  Bulb Suctioning, Tactile Stimulation       Cord    Vessels:  3 vessels  Complications:  Nuchal  Nuchal Intervention:  reduced  Nuchal Cord Description:  loose nuchal cord  Number of Loops:  2  Delayed Cord Clamping?:  Yes  Cord Blood Disposition:  Sent with Baby  Gases Sent?:  No  Stem Cell Collection (by MD):  No       Placenta    Date and time:  2017  5:37 AM  Removal:  Expressed  Appearance:  Intact  Placenta disposition:  discarded           Labor Events:       labor: No     Labor Onset Date/Time: 2017       Dilation Complete Date/Time: 2017 05:21     Start Pushing Date/Time: 2017 05:26     Rupture Date/Time:              Rupture type:           Fluid Amount:        Fluid Color:        Fluid Odor:        Membrane Status (PeriCalm):        Rupture Date/Time (PeriCalm):        Fluid Amount (PeriCalm):        Fluid Color (PeriCalm):         steroids: None     Antibiotics given for GBS: Yes     Induction: none     Indications for induction:        Augmentation:       Indications for augmentation:       Labor complications: None     Additional complications:          Cervical ripening:                     Delivery:      Episiotomy: None     Indication for Episiotomy:       Perineal Lacerations: None Repaired:      Periurethral Laceration: none Repaired:     Labial Laceration: none Repaired:     Sulcus Laceration: none Repaired:     Vaginal Laceration: No Repaired:     Cervical Laceration: No Repaired:     Repair suture: None     Repair # of packets:       Vaginal delivery QBL (mL): 180      QBL (mL): 0     Combined Blood Loss (mL): 180     Vaginal Sweep Performed: Yes     Surgicount Correct: Yes       Other providers:       Anesthesia    Method:  None          Details (if  applicable):  Trial of Labor      Categorization:      Priority:     Indications for :     Incision Type:       Additional  information:  Forceps:    Vacuum:    Breech:    Observed anomalies    Other (Comments):

## 2017-11-24 PROCEDURE — 25000003 PHARM REV CODE 250: Performed by: ADVANCED PRACTICE MIDWIFE

## 2017-11-24 PROCEDURE — 11000001 HC ACUTE MED/SURG PRIVATE ROOM

## 2017-11-24 PROCEDURE — 99231 SBSQ HOSP IP/OBS SF/LOW 25: CPT | Mod: ,,, | Performed by: ADVANCED PRACTICE MIDWIFE

## 2017-11-24 RX ORDER — ONDANSETRON 8 MG/1
8 TABLET, ORALLY DISINTEGRATING ORAL EVERY 8 HOURS PRN
Status: DISCONTINUED | OUTPATIENT
Start: 2017-11-24 | End: 2017-11-25 | Stop reason: HOSPADM

## 2017-11-24 RX ORDER — DIPHENHYDRAMINE HYDROCHLORIDE 50 MG/ML
25 INJECTION INTRAMUSCULAR; INTRAVENOUS EVERY 4 HOURS PRN
Status: DISCONTINUED | OUTPATIENT
Start: 2017-11-24 | End: 2017-11-25 | Stop reason: HOSPADM

## 2017-11-24 RX ORDER — DOCUSATE SODIUM 100 MG/1
200 CAPSULE, LIQUID FILLED ORAL 2 TIMES DAILY PRN
Status: DISCONTINUED | OUTPATIENT
Start: 2017-11-24 | End: 2017-11-25 | Stop reason: HOSPADM

## 2017-11-24 RX ORDER — HYDROCORTISONE 25 MG/G
CREAM TOPICAL 3 TIMES DAILY PRN
Status: DISCONTINUED | OUTPATIENT
Start: 2017-11-24 | End: 2017-11-25 | Stop reason: HOSPADM

## 2017-11-24 RX ORDER — DIPHENHYDRAMINE HCL 25 MG
25 CAPSULE ORAL EVERY 4 HOURS PRN
Status: DISCONTINUED | OUTPATIENT
Start: 2017-11-24 | End: 2017-11-25 | Stop reason: HOSPADM

## 2017-11-24 RX ORDER — ACETAMINOPHEN 325 MG/1
650 TABLET ORAL EVERY 6 HOURS PRN
Status: DISCONTINUED | OUTPATIENT
Start: 2017-11-24 | End: 2017-11-25 | Stop reason: HOSPADM

## 2017-11-24 RX ADMIN — NAPROXEN 500 MG: 500 TABLET ORAL at 12:11

## 2017-11-24 NOTE — SUBJECTIVE & OBJECTIVE
Hospital course: 2017: Stephy is a  at 39w 5d admitted to L&D in active labor.  Expecting a girl.  Plan treatment of GBS with IV PCN.   at 0526 in tub, bilateral labial and perineal abrasions hemostatic and no need for repair. EBL: 180mL  17 d/c home    Interval History: PPD#1    She is doing well this morning. She is tolerating a regular diet without nausea or vomiting. She is voiding spontaneously. She is ambulating. She has passed flatus, and has not a BM. Vaginal bleeding is mild. She denies fever or chills. Abdominal pain is mild and controlled with oral medications. She is breastfeeding. She desires circumcision for her male baby: not applicable.    Objective:     Vital Signs (Most Recent):  Temp: 98.5 °F (36.9 °C) (17 0000)  Pulse: 70 (17 0000)  Resp: 18 (17 0000)  BP: (!) 144/86 (17 0000)  SpO2: 99 % (17 0000) Vital Signs (24h Range):  Temp:  [97.3 °F (36.3 °C)-98.5 °F (36.9 °C)] 98.5 °F (36.9 °C)  Pulse:  [70-80] 70  Resp:  [16-18] 18  SpO2:  [97 %-100 %] 99 %  BP: (111-144)/(54-86) 144/86     Weight: 104.3 kg (230 lb)  Body mass index is 33.97 kg/m².    No intake or output data in the 24 hours ending 17 0900    Significant Labs:  Lab Results   Component Value Date    GROUPTRH AB POS 2017    HEPBSAG Negative 09/15/2017       Recent Labs  Lab 17  0300   HGB 9.4*   HCT 30.3*       I have personallly reviewed all pertinent lab results from the last 24 hours.    H&H 4 weeks ago .  Physical Exam:   Constitutional: She is oriented to person, place, and time. She appears well-developed and well-nourished.        Pulmonary/Chest: Effort normal.          Genitourinary: Vagina normal and uterus normal.               Neurological: She is alert and oriented to person, place, and time.    Skin: Skin is warm and dry.    Psychiatric: She has a normal mood and affect.

## 2017-11-24 NOTE — LACTATION NOTE
11/24/17 1050   Maternal Infant Assessment   Breast Density soft;Right:   Areola elastic;Right:   Nipple(s) graspable;Right:   LATCH Score   Latch 2-->grasps breast, tongue down, lips flanged, rhythmic sucking   Audible Swallowing 2-->spontaneous and intermittent (24 hrs old)   Type Of Nipple 2-->everted (after stimulation)   Comfort (Breast/Nipple) 2-->soft/nontender   Hold (Positioning) 2-->no assist from staff, mother able to position/hold infant   Score (less than 7 for 2/more consecutive times, consult Lactation Consultant) 10   Pain/Comfort Assessments   Pain Assessment Performed Yes       Number Scale   Presence of Pain denies   Maternal Infant Feeding   Maternal Emotional State independent   Infant Positioning cradle   Signs of Milk Transfer audible swallow;infant jaw motion present   Time Spent (min) 15-30 min   Breastfeeding Education adequate infant intake;diet;importance of skin-to-skin contact;label/storage of breast milk;medication effects;milk expression, hand;prenatal vitamins continued   Feeding Infant   Feeding Readiness Cues finger sucking;rooting   Effective Latch During Feeding yes   Audible Swallow yes   Suck/Swallow Coordination present   Lactation Referrals   Lactation Consult Breastfeeding assessment;Knowledge deficit   Lactation Interventions   Attachment Promotion counseling provided;face-to-face positioning promoted;privacy provided;role responsibility promoted;skin-to-skin contact encouraged   Breastfeeding Assistance both breasts offered each feeding;feeding cue recognition promoted;feeding session observed;infant latch-on verified;infant suck/swallow verified;milk expression/pumping;support offered   Maternal Breastfeeding Support diary/feeding log utilized;encouragement offered;lactation counseling provided;maternal hydration promoted;maternal nutrition promoted;maternal rest encouraged   Provided lactation discharge education; reviewed Mother's Breastfeeding Guide; with patient's  permission observed feeding; baby actively sucking with wide mouth pauses and  until content, self detaching praised;

## 2017-11-24 NOTE — PROGRESS NOTES
[facility]  Obstetrics  Postpartum Progress Note    Patient Name: Stephy Spaulding  MRN: [id number]  Admission Date: 2017  Hospital Length of Stay: 1 days  Attending Physician: Aníbal Gaona MD  Primary Care Provider: Stephanie Li MD    Subjective:     Principal Problem: (normal spontaneous vaginal delivery)    Hospital course: 2017: Stephy is a  at 39w 5d admitted to L&D in active labor.  Expecting a girl.  Plan treatment of GBS with IV PCN.   at 0526 in tub, bilateral labial and perineal abrasions hemostatic and no need for repair. EBL: 180mL  17 d/c home    Interval History: PPD#1    She is doing well this morning. She is tolerating a regular diet without nausea or vomiting. She is voiding spontaneously. She is ambulating. She has passed flatus, and has not a BM. Vaginal bleeding is mild. She denies fever or chills. Abdominal pain is mild and controlled with oral medications. She is breastfeeding. She desires circumcision for her male baby: not applicable.    Objective:     Vital Signs (Most Recent):  Temp: 98.5 °F (36.9 °C) (17 0000)  Pulse: 70 (17 0000)  Resp: 18 (17 0000)  BP: (!) 144/86 (17 0000)  SpO2: 99 % (17 0000) Vital Signs (24h Range):  Temp:  [97.3 °F (36.3 °C)-98.5 °F (36.9 °C)] 98.5 °F (36.9 °C)  Pulse:  [70-80] 70  Resp:  [16-18] 18  SpO2:  [97 %-100 %] 99 %  BP: (111-144)/(54-86) 144/86     Weight: 104.3 kg (230 lb)  Body mass index is 33.97 kg/m².    No intake or output data in the 24 hours ending 17 0900    Significant Labs:  Lab Results   Component Value Date    GROUPTRH AB POS 2017    HEPBSAG Negative 09/15/2017       Recent Labs  Lab 17  0300   HGB 9.4*   HCT 30.3*       I have personallly reviewed all pertinent lab results from the last 24 hours.    H&H 4 weeks ago ..5  Physical Exam:   Constitutional: She is oriented to person, place, and time. She appears well-developed and  well-nourished.        Pulmonary/Chest: Effort normal.          Genitourinary: Vagina normal and uterus normal.               Neurological: She is alert and oriented to person, place, and time.    Skin: Skin is warm and dry.    Psychiatric: She has a normal mood and affect.       Assessment/Plan:     21 y.o. female  for:    *  (normal spontaneous vaginal delivery)    PPD #1 stable  Desires d/c home today        Excessive weight gain    Plan for care on L&D unit        Anemia affecting pregnancy in third trimester    Continue PNV and fe at discharge        GBS (group B streptococcus) UTI complicating pregnancy    Treatment per protocol with IV PCN G        CT infection, SHAE at 36 weeks negative    She and partner treated in pregnancy  Negative SHAE at 36wks  Notify Peds        HSV-2 seropositive    Has been taking Valtrex BID and denies any s/s outbreak  Negative speculum exam in OB ED            Disposition: As patient meets milestones, will plan to discharge today.    Monse Liao CNM  Obstetrics  [facility]

## 2017-11-24 NOTE — PLAN OF CARE
Problem: Patient Care Overview  Goal: Plan of Care Review  Outcome: Ongoing (interventions implemented as appropriate)  Provided lactation discharge instructions;  Assessed latch; developed the following breastfeeding plan of care with patient: She will breastfeed baby on cue until content at least 8 times in 24 hours observing for signs of milk transfer; she will wake baby prn; she will avoid bottles, formula and pacifiers;

## 2017-11-24 NOTE — DISCHARGE SUMMARY
[facility]  Obstetrics  Discharge Summary      Patient Name: Stephy Spaulding  MRN: [id number]  Admission Date: 2017  Hospital Length of Stay: 1 days  Discharge Date and Time:  2017 9:08 AM  Attending Physician: Aníbal Gaona MD   Discharging Provider: Monse Liao CNM  Primary Care Provider: Stephanie Li MD    HPI:   Stephy Spaulding is a 21 y.o. female  at 39w5d with Estimated Date of Delivery: 17 based on LMP who presents to L&D c/o contractions, onset yesterday morning at approx 1000.  Occurring every 2-5mins and increasing in intensity. She reports + FM.  Denies VB, LOF or regular UCs. Accompanied by her grandmother and mother to L&D. Expecting a girl!    Last ate dinner at 1000, has been sipping water this evening and chewing ice chips.     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    Lab Results   Component Value Date    HGB 9.6 (L) 10/26/2017    HCT 30.5 (L) 10/26/2017    HEPBSAG Negative 09/15/2017    RPR Non-reactive 10/26/2017       * No surgery found *     Hospital Course:   2017: Stephy is a  at 39w 5d admitted to L&D in active labor.  Expecting a girl.  Plan treatment of GBS with IV PCN.   at 0526 in tub, bilateral labial and perineal abrasions hemostatic and no need for repair. EBL: 180mL  17 d/c home    Consults         Status Ordering Provider     Inpatient consult to Anesthesiology  Once     Provider:  (Not yet assigned)    Acknowledged SUMAN DOWELL     Inpatient consult to Lactation  Once     Provider:  (Not yet assigned)    Completed SUMAN DOWELL          Final Active Diagnoses:    Diagnosis Date Noted POA    PRINCIPAL PROBLEM:   (normal spontaneous vaginal delivery) [O80] 2017 Not Applicable    Meconium in amniotic  fluid [P96.83] 11/23/2017 Clinically Undetermined    Excessive weight gain [R63.5] 10/14/2017 Yes    GBS (group B streptococcus) UTI complicating pregnancy [O23.40, B95.1] 09/25/2017 Yes    Anemia affecting pregnancy in third trimester [O99.013] 09/25/2017 Yes    CT infection, SHAE at 36 weeks negative [O98.819, A74.9] 09/21/2017 Yes    Late prenatal care - at approx 30 wks [O09.30] 09/14/2017 Not Applicable    HSV-2 seropositive [R76.8] 09/14/2017 Yes      Problems Resolved During this Admission:    Diagnosis Date Noted Date Resolved POA    Indication for care in labor or delivery [O75.9] 11/23/2017 11/23/2017 Yes        Labs: All labs within the past 24 hours have been reviewed    Feeding Method: breast    Immunizations     None          Delivery:    Episiotomy: None   Lacerations: None   Repair suture: None   Repair # of packets:     Blood loss (ml): 180     Birth information:  YOB: 2017   Time of birth: 5:26 AM   Sex: female   Delivery type: Vaginal, Spontaneous Delivery   Gestational Age: 39w5d    Delivery Clinician:      Other providers:       Additional  information:  Forceps:    Vacuum:    Breech:    Observed anomalies      Living?:           APGARS  One minute Five minutes Ten minutes   Skin color:         Heart rate:         Grimace:         Muscle tone:         Breathing:         Totals: 9  9        Placenta: Delivered:       appearance    Pending Diagnostic Studies:     None          Discharged Condition: good    Disposition: Home or Self Care    Follow Up:  Follow-up Information     Uyen Ortiz CNM. Schedule an appointment as soon as possible for a visit in 6 weeks.    Specialty:  Obstetrics and Gynecology  Why:  4-6 weeks for postpartum exam  Contact information:  [address]  [place] [facility] [address]  [phone]                 Patient Instructions:     Diet general     Call MD for:  temperature >100.4     Call MD for:  persistent nausea and vomiting or  diarrhea     Call MD for:  severe uncontrolled pain     Call MD for:  severe persistent headache     Call MD for:  persistent dizziness, light-headedness, or visual disturbances     call with problems with breasts of breastfeeding or S&S of PP depression  Medications:  Current Discharge Medication List      CONTINUE these medications which have NOT CHANGED    Details   ferrous sulfate 325 mg (65 mg iron) Tab tablet Take 1 tablet (325 mg total) by mouth once daily.  Qty: 30 tablet, Refills: 3    Associated Diagnoses: Anemia in preg-unspec      PRENATAL VIT,STANLEY 74/IRON/FOLIC (PRENATAL VITAMIN 1+1 ORAL) Take by mouth.      valacyclovir (VALTREX) 500 MG tablet Take 1 tablet (500 mg total) by mouth 2 (two) times daily.  Qty: 10 tablet, Refills: 3    Associated Diagnoses: HSV (herpes simplex virus) anogenital infection         STOP taking these medications       azithromycin (ZITHROMAX) 500 MG tablet Comments:   Reason for Stopping:               Monse Liao CNM  Obstetrics  [facility]

## 2017-11-25 VITALS
DIASTOLIC BLOOD PRESSURE: 71 MMHG | HEART RATE: 60 BPM | HEIGHT: 69 IN | SYSTOLIC BLOOD PRESSURE: 126 MMHG | TEMPERATURE: 98 F | BODY MASS INDEX: 34.07 KG/M2 | WEIGHT: 230 LBS | OXYGEN SATURATION: 99 % | RESPIRATION RATE: 18 BRPM

## 2017-11-25 PROCEDURE — 99238 HOSP IP/OBS DSCHRG MGMT 30/<: CPT | Mod: ,,, | Performed by: ADVANCED PRACTICE MIDWIFE

## 2017-11-25 NOTE — PLAN OF CARE
Problem: Patient Care Overview  Goal: Plan of Care Review  Outcome: Ongoing (interventions implemented as appropriate)  Requested patient call lactation for latch assistance; patient will breastfeed baby on cue until content at least 8 times in 24 hours observing for signs of milk transfer; she will wake baby prn; she will avoid bottles, formula and pacifiers;

## 2017-11-25 NOTE — DISCHARGE SUMMARY
[facility]  Delivery Discharge Summary  Obstetrics    Admit Date: 2017    Discharge Date and Time:  2017 9:36 AM    Primary OB Clinician: TRAVIS Amaya    Attending Physician: Aníbal Gaona MD     Discharge Provider: Joyce Lees    Reason for Admission:     Estimated Date of Delivery: 17     Conditions: Hx HSV    Procedures Performed: * No surgery found *    Hospital Course (synopsis of major diagnoses, care, treatment, and services provided during the course of the hospital stay):    Stephy Spaulding is a 21 y.o. now , PPD #2 who was admitted on 2017  for normal spontaneous vaginal delivery. On initial assessment, vital signs were stable and physical exam was normal. Infant was in cephalic presentation. Patient was subsequently admitted to labor and delivery unit with signed consents. Labor course was managed with support.  Patient delivered a single viable  female. Pt was in stable condition post-delivery and was transferred to the Mother-Baby Unit. Her postpartum course was uncomplicated. On discharge day, patient's pain is controlled with oral pain medications. Patient is tolerating PO without nausea or vomiting, ambulating, voiding. She denies SOB and CP. Denies any HA, vision changes, F/C, LE swelling. Denies any breast pain/soreness.     Delivery:    Episiotomy: None   Lacerations: None   Repair suture: None   Repair # of packets:     Blood loss (ml): 180     Birth information:  YOB: 2017   Time of birth: 5:26 AM   Sex: female   Delivery type: Vaginal, Spontaneous Delivery   Gestational Age: 39w5d    Delivery Clinician:      Other providers:       Additional  information:  Forceps:    Vacuum:    Breech:    Observed anomalies      Living?:           APGARS  One minute Five minutes Ten minutes   Skin color:         Heart rate:         Grimace:         Muscle tone:         Breathing:         Totals: 9  9     "    Placenta: Delivered:       appearance    Tubal Ligation: n/a    Feeding Method: breast    Rh Immune Globulin Given: no    Rubella Vaccine Given: no    Tdap Vaccine Given: no    Consults: none    Significant Diagnostic Studies: Labs: All labs within the past 24 hours have been reviewed    Final Diagnoses:   Principal Problem:  (normal spontaneous vaginal delivery)   Secondary Diagnoses:   Active Hospital Problems    Diagnosis  POA    * (normal spontaneous vaginal delivery) [O80]  Not Applicable    Meconium in amniotic fluid [P96.83]  Clinically Undetermined    Excessive weight gain [R63.5]  Yes    GBS (group B streptococcus) UTI complicating pregnancy [O23.40, B95.1]  Yes    Anemia affecting pregnancy in third trimester [O99.013]  Yes     .  Lab Results   Component Value Date    WBC 9.90 09/15/2017    HGB 9.6 (L) 09/15/2017    HCT 30.4 (L) 09/15/2017    MCV 68 (L) 09/15/2017     (H) 09/15/2017           CT infection, SHAE at 36 weeks negative [O98.819, A74.9]  Yes     10/9/17 states she and partner treated, no unprotected intercourse prior to treatment  Will repeat CT at 36 weeks      Late prenatal care - at approx 30 wks [O09.30]  Not Applicable    HSV-2 seropositive [R76.8]  Yes     No hx genital or oral outbreaks.  Was tested with last pregnancy and told she was positive.  Per up to date "  If the woman is seropositive for HSV-2 or HSV-1 but has no history of genital herpes lesions, we do not offer suppressive antiviral therapy [2]. Use of suppressive antiviral therapy in this setting has not been studied"          Resolved Hospital Problems    Diagnosis Date Resolved POA    Indication for care in labor or delivery [O75.9] 2017 Yes       Discharged Condition: good    Disposition: Home or Self Care    Follow Up/Patient Instructions:     Medications:  Reconciled Home Medications:   Current Discharge Medication List      CONTINUE these medications which have NOT CHANGED    Details "   ferrous sulfate 325 mg (65 mg iron) Tab tablet Take 1 tablet (325 mg total) by mouth once daily.  Qty: 30 tablet, Refills: 3    Associated Diagnoses: Anemia in preg-unspec      PRENATAL VIT,STANLEY 74/IRON/FOLIC (PRENATAL VITAMIN 1+1 ORAL) Take by mouth.      valacyclovir (VALTREX) 500 MG tablet Take 1 tablet (500 mg total) by mouth 2 (two) times daily.  Qty: 10 tablet, Refills: 3    Associated Diagnoses: HSV (herpes simplex virus) anogenital infection         STOP taking these medications       azithromycin (ZITHROMAX) 500 MG tablet Comments:   Reason for Stopping:               Discharge Procedure Orders  Diet general     Diet general     Call MD for:  temperature >100.4     Call MD for:  persistent nausea and vomiting or diarrhea     Call MD for:  severe uncontrolled pain     Call MD for:  severe persistent headache     Call MD for:  persistent dizziness, light-headedness, or visual disturbances     Activity as tolerated     Call MD for:  increased confusion or weakness     Call MD for:  persistent dizziness, light-headedness, or visual disturbances     Call MD for:  worsening rash     Call MD for:  severe persistent headache     Call MD for:  difficulty breathing or increased cough     Call MD for:  redness, tenderness, or signs of infection (pain, swelling, redness, odor or green/yellow discharge around incision site)     Call MD for:  severe uncontrolled pain     Call MD for:  persistent nausea and vomiting or diarrhea     Call MD for:  temperature >100.4       Follow-up Information     Uyen Ortiz CNM. Schedule an appointment as soon as possible for a visit in 6 weeks.    Specialty:  Obstetrics and Gynecology  Why:  4-6 weeks for postpartum exam  Contact information:  [address]  [place] [facility] [address]  [phone]             Follow up In 6 weeks.    Why:  PP Check

## 2017-11-25 NOTE — PLAN OF CARE
Problem: Patient Care Overview  Goal: Plan of Care Review  Outcome: Ongoing (interventions implemented as appropriate)  Report received at 1900, assessment completed. Discharge education completed with patient and she verbalized understanding. Will continue to assess

## 2017-11-25 NOTE — PROGRESS NOTES
11/25/17 3786   Pain/Comfort Assessments   Pain Assessment Performed Yes       Number Scale   Presence of Pain denies   Location - Side Bilateral   Location nipple(s)   Pain Rating: Activity 0   Maternal Infant Feeding   Time Spent (min) 0-15 min   Breastfeeding Education adequate infant intake;importance of skin-to-skin contact   Lactation Referrals   Lactation Consult Knowledge deficit   Lactation Referrals pediatric care provider;outpatient lactation program   Lactation Interventions   Attachment Promotion counseling provided;family involvement promoted;role responsibility promoted;skin-to-skin contact encouraged   Breastfeeding Assistance both breasts offered each feeding;feeding cue recognition promoted;support offered   Maternal Breastfeeding Support encouragement offered   Requested patient call lactation for assistance with breastfeeding; support provided;

## 2017-11-25 NOTE — PROGRESS NOTES
[facility]  Vaginal Delivery Progress Note  Obstetrics    SUBJECTIVE:     Stephy Spaulding is a 21 y.o. female PPD #2 status post Spontaneous vaginal delivery at 39w5d in a pregnancy complicated by N/A. Patient is doing well this morning. She denies nausea, vomiting, fever or chills. Patient reports mild abdominal pain that is well relieved by oral pain medications. Lochia is mild to moderate  and decreasing. Patient is voiding without difficulty and ambulating with no difficulty. She has passed flatus and has had a BM. Patient does plan to breast feed. ? for contraception. She N/A desires circumcision.    OBJECTIVE:     Vital Signs Ranges:  Temp:  [97.5 °F (36.4 °C)-98.3 °F (36.8 °C)] 98.2 °F (36.8 °C)  Pulse:  [54-77] 60  Resp:  [16-18] 18  SpO2:  [99 %] 99 %  BP: (109-145)/(55-71) 126/71    I/O (Last 24H):  No intake or output data in the 24 hours ending 17 0928    Physical Exam:  General:    alert, appears stated age and cooperative   Lungs:  normal effort   Heart:  normal apical pulse   Abdomen:  normal findings: soft, non-tender   Uterine Size:  firm located at the umbilicus.   Incision:  N/A   Extremities:   peripheral pulses normal, no pedal edema, no clubbing or cyanosis, no pedal edema noted     Lab Review:   @[facility]@    ASSESSMENT:     Assessment:  Active Hospital Problems    Diagnosis    * (normal spontaneous vaginal delivery)    Meconium in amniotic fluid    Excessive weight gain    GBS (group B streptococcus) UTI complicating pregnancy    Anemia affecting pregnancy in third trimester     .  Lab Results   Component Value Date    WBC 9.90 09/15/2017    HGB 9.6 (L) 09/15/2017    HCT 30.4 (L) 09/15/2017    MCV 68 (L) 09/15/2017     (H) 09/15/2017           CT infection, SHAE at 36 weeks negative     10/9/17 states she and partner treated, no unprotected intercourse prior to treatment  Will repeat CT at 36 weeks      Late prenatal care - at approx 30 wks    HSV-2  "seropositive     No hx genital or oral outbreaks.  Was tested with last pregnancy and told she was positive.  Per up to date "  If the woman is seropositive for HSV-2 or HSV-1 but has no history of genital herpes lesions, we do not offer suppressive antiviral therapy [2]. Use of suppressive antiviral therapy in this setting has not been studied"          PLAN:     Plan:  1. Postpartum care:   - Patient doing well. Continue routine management and advances.   - Continue PO pain meds. Pain well controlled.   - Post : H/h . 9.4/30.3   - Encourage ambulation   - Circumcision N/A   - Contraception?   - Lactation breastfeeding    2. PP Day 2 stable    Disposition: As patient meets milestones, will plan to discharge today.  "

## 2019-07-09 ENCOUNTER — OFFICE VISIT (OUTPATIENT)
Dept: OBSTETRICS AND GYNECOLOGY | Facility: CLINIC | Age: 24
End: 2019-07-09
Payer: MEDICAID

## 2019-07-09 VITALS
SYSTOLIC BLOOD PRESSURE: 110 MMHG | DIASTOLIC BLOOD PRESSURE: 72 MMHG | WEIGHT: 190.56 LBS | BODY MASS INDEX: 28.23 KG/M2 | HEIGHT: 69 IN

## 2019-07-09 DIAGNOSIS — N92.6 MISSED PERIOD: Primary | ICD-10-CM

## 2019-07-09 DIAGNOSIS — A60.9 HSV (HERPES SIMPLEX VIRUS) ANOGENITAL INFECTION: ICD-10-CM

## 2019-07-09 PROCEDURE — 88175 CYTOPATH C/V AUTO FLUID REDO: CPT

## 2019-07-09 PROCEDURE — 99999 PR PBB SHADOW E&M-EST. PATIENT-LVL III: CPT | Mod: PBBFAC,,, | Performed by: ADVANCED PRACTICE MIDWIFE

## 2019-07-09 PROCEDURE — 99213 PR OFFICE/OUTPT VISIT, EST, LEVL III, 20-29 MIN: ICD-10-PCS | Mod: S$PBB,,, | Performed by: ADVANCED PRACTICE MIDWIFE

## 2019-07-09 PROCEDURE — 87491 CHLMYD TRACH DNA AMP PROBE: CPT

## 2019-07-09 PROCEDURE — 99213 OFFICE O/P EST LOW 20 MIN: CPT | Mod: PBBFAC | Performed by: ADVANCED PRACTICE MIDWIFE

## 2019-07-09 PROCEDURE — 99213 OFFICE O/P EST LOW 20 MIN: CPT | Mod: S$PBB,,, | Performed by: ADVANCED PRACTICE MIDWIFE

## 2019-07-09 PROCEDURE — 99999 PR PBB SHADOW E&M-EST. PATIENT-LVL III: ICD-10-PCS | Mod: PBBFAC,,, | Performed by: ADVANCED PRACTICE MIDWIFE

## 2019-07-09 NOTE — PROGRESS NOTES
"Stephy Spaulding is a 23 y.o. , presents today for amenorrhea.      C/C: amenorrhea  She  has not seen any other provider for this pregnancy    HPI: Reports amenorrhea since Patient's last menstrual period was 2019.. Prior to LMP, menses were  regular occuring every 28 days prior to this.  She is not currently on any contraception. Also reports minimal nausea or vomiting. Has noticed breast tenderness. Denies vaginal bleeding since LMP.    SOCIAL HISTORY: Denies emotional/mental/physical/sexual violence or abuse. Feels safe at home. Accompanied today by her child.    PAP HISTORY: last pap unsure (she is likely due now)/, hx of hsv (seropositive)  Reports asthma (only as child--no exacerbations as adult), HSV -seropositive (denies any outbreaks, wants repeat bloodwork this pregnancy because she wonders if actually accurate diagnosis), unexplained syncopal episode 6mos ago (and one other as teenager possible bradycardia induced? Patient unsure--denies cardiac problems did not f/u with cardiology)    Review of patient's allergies indicates:  No Known Allergies  Past Medical History:   Diagnosis Date    Asthma     as child only    Herpes simplex virus (HSV) infection     Has never had a lesion. States she had a "positive blood test" during her first pregnancy,? type    Syncopal episodes     Bradycardia-induced  6 months ago, one other episode as teenager (not dx with same cause)     Past Surgical History:   Procedure Laterality Date    NO PAST SURGERIES       Past Surgical History:   Procedure Laterality Date    NO PAST SURGERIES       OB History    Para Term  AB Living   2 2 2     2   SAB TAB Ectopic Multiple Live Births         0 1      # Outcome Date GA Lbr Dayron/2nd Weight Sex Delivery Anes PTL Lv   2 Term 17 39w5d / 00:05 3.827 kg (8 lb 7 oz) F Vag-Spont None N HNERIK   1 Term 16 41w0d  3.033 kg (6 lb 11 oz) F Vag-Spont EPI N      OB History        2    Para   2    Term   2 "            AB        Living   2       SAB        TAB        Ectopic        Multiple   0    Live Births   1               Social History     Socioeconomic History    Marital status: Single     Spouse name: Not on file    Number of children: Not on file    Years of education: Not on file    Highest education level: Not on file   Occupational History     Comment: Bartending at night   Social Needs    Financial resource strain: Not on file    Food insecurity:     Worry: Not on file     Inability: Not on file    Transportation needs:     Medical: Not on file     Non-medical: Not on file   Tobacco Use    Smoking status: Former Smoker     Types: Cigarettes    Smokeless tobacco: Never Used   Substance and Sexual Activity    Alcohol use: No     Comment: not with pregnancy    Drug use: No    Sexual activity: Yes     Partners: Male     Birth control/protection: None     Comment: Eric   Lifestyle    Physical activity:     Days per week: 3 days     Minutes per session: 20 min    Stress: To some extent   Relationships    Social connections:     Talks on phone: Not on file     Gets together: Not on file     Attends Anabaptism service: Not on file     Active member of club or organization: Not on file     Attends meetings of clubs or organizations: Not on file     Relationship status: Not on file   Other Topics Concern    Not on file   Social History Narrative    FOB Eric    Daughter [employer]    She occasionally works at [email or web address] as      Lives with Eric in apartment in [facility]         Family History   Problem Relation Age of Onset    Emphysema Paternal Grandfather     Restless legs syndrome Maternal Grandmother     Restless legs syndrome Mother     Sickle cell trait Sister     Breast cancer Neg Hx     Colon cancer Neg Hx     Ovarian cancer Neg Hx      Social History     Substance and Sexual Activity   Sexual Activity Yes    Partners: Male    Birth control/protection: None    Comment:  Eric       GENETIC SCREENING   Patient's age 35 years or older as of estimated date of delivery? n  Neural tube defect (meningomyelocele, spina bifida, or anencephaly)? n  Down syndrome? n  Kenny-Sachs (Ashkenazi Hinduism, Cajun, Afghan Cypriot)? nn  Canavan disease (Ashkenazi Hinduism)? n  Familial dysautonomia (Ashkenazi Hinduism)? n  Sickle cell disease or trait ()? y  Hemophilia or other blood disorders? n  Cystic fibrosis? n  Muscular dystrophy? n  Ceiba's chorea? n  Thalassemia (Italian, Greek, Mediterranean, or  background) MCV less than 80? n  Congenital heart defect? n  Mental retardation/autism? n   If Yes, was person tested for Fragile X? n  Other inherited genetic or chromosomal disorder? n  Maternal metabolic disorder (e.g. type 1 diabetes, PKU)? n  Patient or baby's father had a child with birth defects not listed above? n  Recurrent pregnancy loss or a stillbirth: n  Medications (including supplements, vitamins, herbs or OTC drugs)/illicit/recreational drugs/alcohol since last menstrual period? n   If yes, agent(s) and strength/dose: n  List any other genetic risks: n  Comments/counseling:NT ordered today, unsure if had carrier screen last preg, information given and f/u at next visit.    INFECTION HISTORY  Live with someone with TB or exposed to TB: n  Patient or partner has history of genital herpes: y  Rash or viral illness since last menstrual period: n  Patient or partner has hepatitis B or C: n  History of STD, gonorrhea, chlamydia, HPV, HIV, syphilis (list all that apply): y (chlamydia long time ago most recent testing normal)  List other infections: n  Additional comments:     Stephanie Li MD     ROS:  negative  Constitutional/Gen: Denies fevers, chills, malaise, or weight loss. mild fatigue   Psych: Denies depression, anxiety  Eyes: Denies changes in vision or scotomata  Ears, nose, mouth, throat: Denies sinus tenderness, swelling, or dentition problems  CV/vasc: Denies  "heart palpitations or edema  Resp: Denies SOB or dyspnea  Breasts: Denies mass, nipple discharge, or trauma. mild breast tenderness.  GI: Denies constipation, diarrhea, or vomiting. denies nausea.  : Denies vaginal discharge, dysuria or pelvic pain. denies urinary frequency  MS: Denies weakness, soreness, or changes in ROM    OBJECTIVE:  /72   Ht 5' 9" (1.753 m)   Wt 86.4 kg (190 lb 9.4 oz)   LMP 2019   BMI 28.14 kg/m²   Constitutional/Gen: NAD, appears stated age, well groomed  Neck: supple, no masses or enlargement  Head: normocephalic  Skin: warm and dry w/o rash  Mouth: no obvious caries  Lung: normal resp effort, CTAB  Heart: normal HR, RRR   Back: negative CVAT  Breasts: bilaterally--no masses, tenderness, skin changes, or nipple discharge noted, R side supernumerary nipple noted. Less than 1cm with cleft.  Abdomen: soft, nontender, no masses, and bowel sounds normal, no enlargement  External genitalia: no lesions or discharge, normal hair distribution  Urethral meatus: normal size and location, no lesions or prolapse  Vagina: normal appearance, no lesions, no discharge, no evidence cystocele or rectocele.  Cervix: normal appearance, no discharge, no lesions, negative CMT  Uterus: nontender, mobile, approx 7 week size, contour, and position.   Adnexa: no masses or tenderness  Anus/Perineum: normal appearance, with no lesions or discharge. Internal exam deferred.  Extremities: FROM, with no edema or tenderness.  Neurologic: A&O x 4, non-focal, cranial nerves 2-12 grossly intact  Psych: affect appropriate and without signs of mood, thought or memory difficulty appreciated    UPT positive in office    ASSESSMENT:  23 y.o. female  with amenorrhea  Likely at 6w2d via lmp; S=D  Body mass index is 28.14 kg/m².  Patient Active Problem List   Diagnosis    Late prenatal care - at approx 30 wks    HSV-2 seropositive    CT infection, SHAE at 36 weeks negative    GBS (group B streptococcus) UTI " complicating pregnancy    Anemia affecting pregnancy in third trimester    Still breastfeeding John (1.5 yrs)    Excessive weight gain    Prenatal care in third trimester    Meconium in amniotic fluid       PLAN:  1. Amenorrhea  -- + UPT in office, Patient's last menstrual period was 05/26/2019. --> Estimated Date of Delivery: None noted.  -- Dating US ordered  -- Anatomical US not ordered  -- Routine serum and urine prenatal labs today    2. Physical exam today. Discussed ASCCP guidelines. Pap today/ next pap due not later than 7/2021.     3. BMI 28  -- Discussed IOM recommended weight gain of:   Weight category Pre pre BMI  Recommended TWG   Underweight Less than 18.5 28-40    Normal Weight 18.5-24.9  25-35    Overweight 25-29.9  15-25    Obese   30 and greater  11-20   -- Discussed criteria for delivery at [facility] r/t excessive pre-preg weight or excessive weight gain:   Pre-pregnancy BMI over 40 or excess pregnancy weight gain defined as:   Pre-preg BMI < 18.5; Excess weight gain = > 60 pound   Pre-preg BMI 18.5-24.9;  Excess weight gain = > 53 pounds   Pre-preg BMI 25-29.9;  Excess weight gain = > 38 pounds   Pre-preg BMI > 30;  Excess weight gain = > 30 pounds    4. Discussed nausea and vomiting in pregnancy  -- Education regarding lifestyle and dietary modifications  -- Reviewed use of B6/Unisom prn. Pt will notify us if no relief/worsening symptoms, will consider alternative therapies prn    5. Pregnancy education and couseling; handouts and booklet provided  -- She has  already attended [facility] and [employer] and has  toured facility  -- Oriented to practice and anticipated prenatal course of care and how to contact us  -- Reviewed [facility] guidelines and admission, exclusion, and transfer criteria  -- Precautions/warning signs reviewed  -- Common complaints of pregnancy  -- Routine prenatal labs including HIV  -- Ultrasounds  -- Childbirth education/hospital/[facility] facilities  -- Nutrition, prepregnant BMI, and  recommended weight gain  -- Toxoplasmosis precautions (Cats/Raw Meat)  -- Sexual activity and exercise  -- Environmental/Work hazards  -- Travel  -- Tobacco (Ask, Advise, Assess, Assist, and Arrange), as well as alcohol and drug use  -- Use of any medications (Including supplements, Vitamins, Herbs, or OTC Drugs)  -- Domestic violence screen negative      Carrier Screening handout from [employer] provided            8. Syncope episode secondary to bradycardia--consider consulting Dr. Perry regarding this (problems)    9. Reviewed warning signs, precautions, and how/when to contact us.     10. RTC x 4 weeks, call or present sooner prn.     Updated Medication List:  Current Outpatient Medications   Medication Sig Dispense Refill    PRENATAL VIT,STANLEY 74/IRON/FOLIC (PRENATAL VITAMIN 1+1 ORAL) Take by mouth.      valacyclovir (VALTREX) 500 MG tablet Take 1 tablet (500 mg total) by mouth 2 (two) times daily. 10 tablet 3     No current facility-administered medications for this visit.          Corin Mcgregor CNM  7/9/2019 11:52 AM

## 2019-07-10 LAB
C TRACH DNA SPEC QL NAA+PROBE: NOT DETECTED
N GONORRHOEA DNA SPEC QL NAA+PROBE: NOT DETECTED

## 2019-07-11 ENCOUNTER — TELEPHONE (OUTPATIENT)
Dept: MATERNAL FETAL MEDICINE | Facility: CLINIC | Age: 24
End: 2019-07-11

## 2019-07-11 NOTE — TELEPHONE ENCOUNTER
Message left for patient to call [facility] clinic at [phone] to schedule NT ultrasound

## 2019-07-17 DIAGNOSIS — B37.9 YEAST INFECTION: Primary | ICD-10-CM

## 2019-07-17 RX ORDER — MICONAZOLE NITRATE 2 %
1 CREAM WITH APPLICATOR VAGINAL NIGHTLY
Qty: 45 G | Refills: 0 | Status: SHIPPED | OUTPATIENT
Start: 2019-07-17 | End: 2019-07-24

## 2019-07-18 ENCOUNTER — TELEPHONE (OUTPATIENT)
Dept: OBSTETRICS AND GYNECOLOGY | Facility: CLINIC | Age: 24
End: 2019-07-18

## 2019-07-18 NOTE — TELEPHONE ENCOUNTER
Pt notified of normal pap smear result c yeast noted. OTC monistat 7 day is encouraged to help clear up symptoms.  Pt denies questions @ present.

## 2019-08-06 ENCOUNTER — PROCEDURE VISIT (OUTPATIENT)
Dept: OBSTETRICS AND GYNECOLOGY | Facility: CLINIC | Age: 24
End: 2019-08-06
Payer: MEDICAID

## 2019-08-06 ENCOUNTER — LAB VISIT (OUTPATIENT)
Dept: LAB | Facility: OTHER | Age: 24
End: 2019-08-06
Payer: MEDICAID

## 2019-08-06 ENCOUNTER — INITIAL PRENATAL (OUTPATIENT)
Dept: OBSTETRICS AND GYNECOLOGY | Facility: CLINIC | Age: 24
End: 2019-08-06
Payer: MEDICAID

## 2019-08-06 DIAGNOSIS — R76.8 HSV-2 SEROPOSITIVE: ICD-10-CM

## 2019-08-06 DIAGNOSIS — A60.9 HSV (HERPES SIMPLEX VIRUS) ANOGENITAL INFECTION: ICD-10-CM

## 2019-08-06 DIAGNOSIS — Z34.91 PRENATAL CARE IN FIRST TRIMESTER: Primary | ICD-10-CM

## 2019-08-06 DIAGNOSIS — N92.6 MISSED PERIOD: ICD-10-CM

## 2019-08-06 DIAGNOSIS — Z3A.10 10 WEEKS GESTATION OF PREGNANCY: ICD-10-CM

## 2019-08-06 DIAGNOSIS — O21.9 NAUSEA AND VOMITING IN PREGNANCY: ICD-10-CM

## 2019-08-06 PROBLEM — O99.013 ANEMIA AFFECTING PREGNANCY IN THIRD TRIMESTER: Status: RESOLVED | Noted: 2017-09-25 | Resolved: 2019-08-06

## 2019-08-06 PROBLEM — B95.1 GBS (GROUP B STREPTOCOCCUS) UTI COMPLICATING PREGNANCY: Status: RESOLVED | Noted: 2017-09-25 | Resolved: 2019-08-06

## 2019-08-06 PROBLEM — Z34.93 PRENATAL CARE IN THIRD TRIMESTER: Status: RESOLVED | Noted: 2017-10-26 | Resolved: 2019-08-06

## 2019-08-06 PROBLEM — O23.40 GBS (GROUP B STREPTOCOCCUS) UTI COMPLICATING PREGNANCY: Status: RESOLVED | Noted: 2017-09-25 | Resolved: 2019-08-06

## 2019-08-06 PROBLEM — R63.5 EXCESSIVE WEIGHT GAIN: Status: RESOLVED | Noted: 2017-10-14 | Resolved: 2019-08-06

## 2019-08-06 PROBLEM — Z78.9 BREASTFEEDING (INFANT): Status: RESOLVED | Noted: 2017-09-25 | Resolved: 2019-08-06

## 2019-08-06 PROBLEM — O98.819 CHLAMYDIA INFECTION DURING PREGNANCY: Status: RESOLVED | Noted: 2017-09-21 | Resolved: 2019-08-06

## 2019-08-06 PROBLEM — A74.9 CHLAMYDIA INFECTION DURING PREGNANCY: Status: RESOLVED | Noted: 2017-09-21 | Resolved: 2019-08-06

## 2019-08-06 PROBLEM — O09.30 NO PRENATAL CARE IN CURRENT PREGNANCY: Status: RESOLVED | Noted: 2017-09-14 | Resolved: 2019-08-06

## 2019-08-06 LAB
ABO + RH BLD: NORMAL
ANION GAP SERPL CALC-SCNC: 10 MMOL/L (ref 8–16)
BASOPHILS # BLD AUTO: 0.02 K/UL (ref 0–0.2)
BASOPHILS NFR BLD: 0.3 % (ref 0–1.9)
BLD GP AB SCN CELLS X3 SERPL QL: NORMAL
BUN SERPL-MCNC: 11 MG/DL (ref 6–20)
CALCIUM SERPL-MCNC: 9.5 MG/DL (ref 8.7–10.5)
CHLORIDE SERPL-SCNC: 107 MMOL/L (ref 95–110)
CO2 SERPL-SCNC: 22 MMOL/L (ref 23–29)
CREAT SERPL-MCNC: 0.7 MG/DL (ref 0.5–1.4)
DIFFERENTIAL METHOD: ABNORMAL
EOSINOPHIL # BLD AUTO: 0.1 K/UL (ref 0–0.5)
EOSINOPHIL NFR BLD: 0.9 % (ref 0–8)
ERYTHROCYTE [DISTWIDTH] IN BLOOD BY AUTOMATED COUNT: 17.2 % (ref 11.5–14.5)
EST. GFR  (AFRICAN AMERICAN): >60 ML/MIN/1.73 M^2
EST. GFR  (NON AFRICAN AMERICAN): >60 ML/MIN/1.73 M^2
GLUCOSE SERPL-MCNC: 75 MG/DL (ref 70–110)
HCT VFR BLD AUTO: 34.6 % (ref 37–48.5)
HGB BLD-MCNC: 10.7 G/DL (ref 12–16)
IMM GRANULOCYTES # BLD AUTO: 0.02 K/UL (ref 0–0.04)
IMM GRANULOCYTES NFR BLD AUTO: 0.3 % (ref 0–0.5)
LYMPHOCYTES # BLD AUTO: 2.2 K/UL (ref 1–4.8)
LYMPHOCYTES NFR BLD: 28.2 % (ref 18–48)
MCH RBC QN AUTO: 21.8 PG (ref 27–31)
MCHC RBC AUTO-ENTMCNC: 30.9 G/DL (ref 32–36)
MCV RBC AUTO: 71 FL (ref 82–98)
MONOCYTES # BLD AUTO: 0.3 K/UL (ref 0.3–1)
MONOCYTES NFR BLD: 3.8 % (ref 4–15)
NEUTROPHILS # BLD AUTO: 5.3 K/UL (ref 1.8–7.7)
NEUTROPHILS NFR BLD: 66.5 % (ref 38–73)
NRBC BLD-RTO: 0 /100 WBC
PLATELET # BLD AUTO: 298 K/UL (ref 150–350)
PMV BLD AUTO: 9.8 FL (ref 9.2–12.9)
POTASSIUM SERPL-SCNC: 3.5 MMOL/L (ref 3.5–5.1)
RBC # BLD AUTO: 4.9 M/UL (ref 4–5.4)
SODIUM SERPL-SCNC: 139 MMOL/L (ref 136–145)
WBC # BLD AUTO: 7.88 K/UL (ref 3.9–12.7)

## 2019-08-06 PROCEDURE — 86592 SYPHILIS TEST NON-TREP QUAL: CPT

## 2019-08-06 PROCEDURE — 85025 COMPLETE CBC W/AUTO DIFF WBC: CPT

## 2019-08-06 PROCEDURE — 99213 PR OFFICE/OUTPT VISIT, EST, LEVL III, 20-29 MIN: ICD-10-PCS | Mod: S$PBB,TH,, | Performed by: ADVANCED PRACTICE MIDWIFE

## 2019-08-06 PROCEDURE — 99211 OFF/OP EST MAY X REQ PHY/QHP: CPT | Mod: PBBFAC,25,TH | Performed by: ADVANCED PRACTICE MIDWIFE

## 2019-08-06 PROCEDURE — 99213 OFFICE O/P EST LOW 20 MIN: CPT | Mod: S$PBB,TH,, | Performed by: ADVANCED PRACTICE MIDWIFE

## 2019-08-06 PROCEDURE — 80048 BASIC METABOLIC PNL TOTAL CA: CPT

## 2019-08-06 PROCEDURE — 36415 COLL VENOUS BLD VENIPUNCTURE: CPT

## 2019-08-06 PROCEDURE — 86901 BLOOD TYPING SEROLOGIC RH(D): CPT

## 2019-08-06 PROCEDURE — 87086 URINE CULTURE/COLONY COUNT: CPT

## 2019-08-06 PROCEDURE — 99999 PR PBB SHADOW E&M-EST. PATIENT-LVL I: ICD-10-PCS | Mod: PBBFAC,,, | Performed by: ADVANCED PRACTICE MIDWIFE

## 2019-08-06 PROCEDURE — 87340 HEPATITIS B SURFACE AG IA: CPT

## 2019-08-06 PROCEDURE — 86703 HIV-1/HIV-2 1 RESULT ANTBDY: CPT

## 2019-08-06 PROCEDURE — 86762 RUBELLA ANTIBODY: CPT

## 2019-08-06 PROCEDURE — 86696 HERPES SIMPLEX TYPE 2 TEST: CPT

## 2019-08-06 PROCEDURE — 99999 PR PBB SHADOW E&M-EST. PATIENT-LVL I: CPT | Mod: PBBFAC,,, | Performed by: ADVANCED PRACTICE MIDWIFE

## 2019-08-06 RX ORDER — DOXYLAMINE SUCCINATE AND PYRIDOXINE HYDROCHLORIDE, DELAYED RELEASE TABLETS 10 MG/10 MG 10; 10 MG/1; MG/1
TABLET, DELAYED RELEASE ORAL
Qty: 120 TABLET | Refills: 3 | Status: SHIPPED | OUTPATIENT
Start: 2019-08-06 | End: 2019-11-07

## 2019-08-06 NOTE — PROGRESS NOTES
23 y.o. female  at 10w2d by LMP (sonogram is scheduled in couple of days)  NT testing is scheduled (), unsure about carrier screening, will likely have drawn at next visit.  Doing well complains of nausea and vomiting and requests rx for meds, struggling keeping meds down   TWG: -3lbs   Reviewed warning signs, precautions and how/when to call.  RTC x 4 wks, call or present sooner prn.   Birth Center Risk Assessment: 0  0- CNM management in ABC  1- CNM management on L&D  2- Consultation with OB to develop plan of care  3- Collaborative CNM/OB management with delivery on L&D  4- Referral or transfer of care to MD Corin Mcgregor CNM

## 2019-08-07 LAB
BACTERIA UR CULT: NORMAL
HBV SURFACE AG SERPL QL IA: NEGATIVE
HIV 1+2 AB+HIV1 P24 AG SERPL QL IA: NEGATIVE
RPR SER QL: NORMAL
RUBV IGG SER-ACNC: 75 IU/ML
RUBV IGG SER-IMP: REACTIVE

## 2019-08-08 PROBLEM — D56.9 THALASSEMIA: Status: ACTIVE | Noted: 2019-08-08

## 2019-08-09 LAB
HSV1 IGG SERPL QL IA: POSITIVE
HSV2 IGG SERPL QL IA: POSITIVE

## 2019-08-12 DIAGNOSIS — D50.9 IRON DEFICIENCY ANEMIA DURING PREGNANCY: Primary | ICD-10-CM

## 2019-08-12 DIAGNOSIS — O99.019 IRON DEFICIENCY ANEMIA DURING PREGNANCY: Primary | ICD-10-CM

## 2019-08-12 RX ORDER — IRON POLYSACCHARIDE COMPLEX 150 MG
150 CAPSULE ORAL DAILY
Qty: 30 CAPSULE | Refills: 3 | Status: SHIPPED | OUTPATIENT
Start: 2019-08-12 | End: 2019-11-07

## 2019-08-12 RX ORDER — IRON POLYSACCHARIDE COMPLEX 150 MG
150 CAPSULE ORAL DAILY
Qty: 30 CAPSULE | Refills: 3 | Status: SHIPPED | OUTPATIENT
Start: 2019-08-12 | End: 2019-08-12

## 2019-08-20 ENCOUNTER — TELEPHONE (OUTPATIENT)
Dept: MATERNAL FETAL MEDICINE | Facility: CLINIC | Age: 24
End: 2019-08-20

## 2019-08-22 ENCOUNTER — TELEPHONE (OUTPATIENT)
Dept: OBSTETRICS AND GYNECOLOGY | Facility: HOSPITAL | Age: 24
End: 2019-08-22

## 2019-08-22 NOTE — TELEPHONE ENCOUNTER
Called patient to inform/ remind patient of NT testing appropriate time frame.  Will send MY CHART message.  If patient desires genetic testing and unable to come for NT sonogram, consider QUAD screen as alternative which can be drawn later in the pregnancy. Patient to call back or return message if has desire for sequential screen with NT measurement--needs to be scheduled within the next week.  Corin Mcgregor CNM, MSN  08/22/2019  2:30 PM        ----- Message from Lori Davila MA sent at 8/20/2019  8:08 AM CDT -----  Corin,    I just wanted to inform you that Ms. Spaulding did not make her appointment with [facility] on 08/19/19. She was scheduled for an NT scan.    Thank You    Lori Davila [facility]/PAR [facility]  Maternal Fetal Medicine  [phone]

## 2019-08-27 ENCOUNTER — PATIENT MESSAGE (OUTPATIENT)
Dept: MATERNAL FETAL MEDICINE | Facility: CLINIC | Age: 24
End: 2019-08-27

## 2019-09-11 ENCOUNTER — PATIENT MESSAGE (OUTPATIENT)
Dept: OBSTETRICS AND GYNECOLOGY | Facility: HOSPITAL | Age: 24
End: 2019-09-11

## 2019-09-11 ENCOUNTER — PATIENT MESSAGE (OUTPATIENT)
Dept: MATERNAL FETAL MEDICINE | Facility: CLINIC | Age: 24
End: 2019-09-11

## 2019-09-11 DIAGNOSIS — Z36.89 ENCOUNTER FOR FETAL ANATOMIC SURVEY: Primary | ICD-10-CM

## 2019-09-30 ENCOUNTER — ROUTINE PRENATAL (OUTPATIENT)
Dept: OBSTETRICS AND GYNECOLOGY | Facility: CLINIC | Age: 24
End: 2019-09-30
Payer: MEDICAID

## 2019-09-30 ENCOUNTER — LAB VISIT (OUTPATIENT)
Dept: LAB | Facility: OTHER | Age: 24
End: 2019-09-30
Attending: ADVANCED PRACTICE MIDWIFE
Payer: MEDICAID

## 2019-09-30 VITALS
DIASTOLIC BLOOD PRESSURE: 66 MMHG | WEIGHT: 181.88 LBS | BODY MASS INDEX: 26.86 KG/M2 | SYSTOLIC BLOOD PRESSURE: 114 MMHG

## 2019-09-30 DIAGNOSIS — Z34.90 PREGNANCY WITH ONE FETUS, ANTEPARTUM: ICD-10-CM

## 2019-09-30 DIAGNOSIS — Z34.92 PRENATAL CARE IN SECOND TRIMESTER: ICD-10-CM

## 2019-09-30 DIAGNOSIS — O21.9 NAUSEA AND VOMITING DURING PREGNANCY: ICD-10-CM

## 2019-09-30 DIAGNOSIS — Z34.92 PRENATAL CARE IN SECOND TRIMESTER: Primary | ICD-10-CM

## 2019-09-30 PROCEDURE — 99999 PR PBB SHADOW E&M-EST. PATIENT-LVL II: CPT | Mod: PBBFAC,,, | Performed by: ADVANCED PRACTICE MIDWIFE

## 2019-09-30 PROCEDURE — 99999 PR PBB SHADOW E&M-EST. PATIENT-LVL II: ICD-10-PCS | Mod: PBBFAC,,, | Performed by: ADVANCED PRACTICE MIDWIFE

## 2019-09-30 PROCEDURE — 99213 OFFICE O/P EST LOW 20 MIN: CPT | Mod: S$PBB,TH,, | Performed by: ADVANCED PRACTICE MIDWIFE

## 2019-09-30 PROCEDURE — 99212 OFFICE O/P EST SF 10 MIN: CPT | Mod: PBBFAC,TH | Performed by: ADVANCED PRACTICE MIDWIFE

## 2019-09-30 PROCEDURE — 81511 FTL CGEN ABNOR FOUR ANAL: CPT

## 2019-09-30 PROCEDURE — 99213 PR OFFICE/OUTPT VISIT, EST, LEVL III, 20-29 MIN: ICD-10-PCS | Mod: S$PBB,TH,, | Performed by: ADVANCED PRACTICE MIDWIFE

## 2019-09-30 PROCEDURE — 36415 COLL VENOUS BLD VENIPUNCTURE: CPT

## 2019-09-30 RX ORDER — ONDANSETRON 4 MG/1
8 TABLET, FILM COATED ORAL EVERY 8 HOURS PRN
Qty: 30 TABLET | Refills: 1 | Status: SHIPPED | OUTPATIENT
Start: 2019-09-30 | End: 2019-11-07

## 2019-10-02 PROBLEM — Z34.90 PREGNANCY WITH ONE FETUS, ANTEPARTUM: Status: ACTIVE | Noted: 2019-10-02

## 2019-10-02 PROBLEM — Z3A.10 10 WEEKS GESTATION OF PREGNANCY: Status: RESOLVED | Noted: 2019-08-06 | Resolved: 2019-10-02

## 2019-10-02 LAB
# FETUSES US: NORMAL
2ND TRIMESTER 4 SCREEN PNL SERPL: NEGATIVE
2ND TRIMESTER 4 SCREEN SERPL-IMP: NORMAL
AFP MOM SERPL: 0.82
AFP SERPL-MCNC: 37.9 NG/ML
AGE AT DELIVERY: 24
B-HCG MOM SERPL: 0.6
B-HCG SERPL-ACNC: 15.6 IU/ML
FET TS 21 RISK FROM MAT AGE: NORMAL
GA (DAYS): 1 D
GA (WEEKS): 18 WK
GA METHOD: NORMAL
IDDM PATIENT QL: NORMAL
INHIBIN A MOM SERPL: 1.5
INHIBIN A SERPL-MCNC: 215.5 PG/ML
SMOKING STATUS FTND: NO
TS 18 RISK FETUS: NORMAL
TS 21 RISK FETUS: NORMAL
U ESTRIOL MOM SERPL: 0.77
U ESTRIOL SERPL-MCNC: 1.11 NG/ML

## 2019-10-07 ENCOUNTER — PROCEDURE VISIT (OUTPATIENT)
Dept: MATERNAL FETAL MEDICINE | Facility: CLINIC | Age: 24
End: 2019-10-07
Payer: MEDICAID

## 2019-10-07 DIAGNOSIS — Z36.89 ENCOUNTER FOR FETAL ANATOMIC SURVEY: ICD-10-CM

## 2019-10-07 PROCEDURE — 76805 OB US >/= 14 WKS SNGL FETUS: CPT | Mod: 26,S$PBB,, | Performed by: OBSTETRICS & GYNECOLOGY

## 2019-10-07 PROCEDURE — 76805 OB US >/= 14 WKS SNGL FETUS: CPT | Mod: PBBFAC | Performed by: OBSTETRICS & GYNECOLOGY

## 2019-10-07 PROCEDURE — 99499 NO LOS: ICD-10-PCS | Mod: S$PBB,,, | Performed by: OBSTETRICS & GYNECOLOGY

## 2019-10-07 PROCEDURE — 99499 UNLISTED E&M SERVICE: CPT | Mod: S$PBB,,, | Performed by: OBSTETRICS & GYNECOLOGY

## 2019-10-07 PROCEDURE — 76805 PR US, OB 14+WKS, TRANSABD, SINGLE GESTATION: ICD-10-PCS | Mod: 26,S$PBB,, | Performed by: OBSTETRICS & GYNECOLOGY

## 2019-10-18 DIAGNOSIS — O23.42 UTI (URINARY TRACT INFECTION) DURING PREGNANCY, SECOND TRIMESTER: ICD-10-CM

## 2019-10-18 DIAGNOSIS — D56.9 THALASSEMIA, UNSPECIFIED TYPE: ICD-10-CM

## 2019-10-18 DIAGNOSIS — D58.2 HEMOGLOBINOPATHY: Primary | ICD-10-CM

## 2019-10-18 DIAGNOSIS — O23.41 UTI (URINARY TRACT INFECTION) DURING PREGNANCY, FIRST TRIMESTER: ICD-10-CM

## 2019-10-18 NOTE — ASSESSMENT & PLAN NOTE
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

## 2019-10-23 ENCOUNTER — PATIENT MESSAGE (OUTPATIENT)
Dept: OBSTETRICS AND GYNECOLOGY | Facility: CLINIC | Age: 24
End: 2019-10-23

## 2019-10-28 ENCOUNTER — LAB VISIT (OUTPATIENT)
Dept: LAB | Facility: OTHER | Age: 24
End: 2019-10-28
Payer: MEDICAID

## 2019-10-28 ENCOUNTER — ROUTINE PRENATAL (OUTPATIENT)
Dept: OBSTETRICS AND GYNECOLOGY | Facility: CLINIC | Age: 24
End: 2019-10-28
Payer: MEDICAID

## 2019-10-28 VITALS
WEIGHT: 185.06 LBS | SYSTOLIC BLOOD PRESSURE: 110 MMHG | BODY MASS INDEX: 27.33 KG/M2 | DIASTOLIC BLOOD PRESSURE: 26 MMHG

## 2019-10-28 DIAGNOSIS — Z34.90 PREGNANCY WITH ONE FETUS, ANTEPARTUM: ICD-10-CM

## 2019-10-28 DIAGNOSIS — D58.2 HEMOGLOBINOPATHY: ICD-10-CM

## 2019-10-28 DIAGNOSIS — Z34.92 PREGNANT AND NOT YET DELIVERED IN SECOND TRIMESTER: Primary | ICD-10-CM

## 2019-10-28 PROCEDURE — 83020 HEMOGLOBIN ELECTROPHORESIS: CPT

## 2019-10-28 PROCEDURE — 99999 PR PBB SHADOW E&M-EST. PATIENT-LVL II: CPT | Mod: PBBFAC,,, | Performed by: ADVANCED PRACTICE MIDWIFE

## 2019-10-28 PROCEDURE — 36415 COLL VENOUS BLD VENIPUNCTURE: CPT

## 2019-10-28 PROCEDURE — 99212 OFFICE O/P EST SF 10 MIN: CPT | Mod: TH,S$PBB,, | Performed by: ADVANCED PRACTICE MIDWIFE

## 2019-10-28 PROCEDURE — 99999 PR PBB SHADOW E&M-EST. PATIENT-LVL II: ICD-10-PCS | Mod: PBBFAC,,, | Performed by: ADVANCED PRACTICE MIDWIFE

## 2019-10-28 PROCEDURE — 87086 URINE CULTURE/COLONY COUNT: CPT

## 2019-10-28 PROCEDURE — 99212 OFFICE O/P EST SF 10 MIN: CPT | Mod: PBBFAC,TH | Performed by: ADVANCED PRACTICE MIDWIFE

## 2019-10-28 PROCEDURE — 99212 PR OFFICE/OUTPT VISIT, EST, LEVL II, 10-19 MIN: ICD-10-PCS | Mod: TH,S$PBB,, | Performed by: ADVANCED PRACTICE MIDWIFE

## 2019-10-28 NOTE — PROGRESS NOTES
Chief Complaint   Patient presents with    Routine Prenatal Visit       23 y.o., at 22w1d by Estimated Date of Delivery: 3/1/20    Complaints today: Pt concerned about potential UTI symptoms. States urine is cloudy and she occasionally has burning upon ending urination. Doing well.  TWG: -5 lbs but has gained since last visit. She states she is still having some n/v but is able to hold down food and liquids now. Reviewed Vit B6 / Unisom - she takes Zofran occasionally but not helping much. She will call if symptoms worsening or not improving.     ROS  OBSTETRICS:   Contractions No   Bleeding No   Loss of fluid No   Fetal mvmnt +  GASTRO:   Nausea No   Vomiting No      OB History    Para Term  AB Living   3 2 2     2   SAB TAB Ectopic Multiple Live Births         0 1      # Outcome Date GA Lbr Dayron/2nd Weight Sex Delivery Anes PTL Lv   3 Current            2 Term 17 39w5d / 00:05 3.827 kg (8 lb 7 oz) F Vag-Spont None N HENRIK   1 Term 16 41w0d  3.033 kg (6 lb 11 oz) F Vag-Spont EPI N        Dating reviewed  Allergies and problem list reviewed and updated  Medical and surgical history reviewed  Prenatal labs reviewed and updated    PHYSICAL EXAM  BP (!) 110/26   Wt 84 kg (185 lb 1.2 oz)   LMP 2019   BMI 27.33 kg/m²     GENERAL: No acute distress  HEENT: Normocephalic, atraumatic  NEURO: Alert and oriented x3  PSYCH: Normal mood and affect  PULMONARY: Non-labored respiration; no tachypnea  ABD: Soft, gravid, nontender; no hernia or hepatosplenomegaly  FH = umbilicus    ASSESSMENT AND PLAN    Pregnancy #3 Problems (from 19 to present)     Problem Noted Resolved    Pregnancy with one fetus, antepartum 10/2/2019 by Uyen Ortiz CNM No    Overview Addendum 10/28/2019  2:01 PM by Shena Mata CNM     Prepregnancy BMI: 28 - ABC max 38#  Pap: 2019 - NILM  Dating - per LMP  U/S - WNL   Aneuploidy screening - negative Quad screen  (AB POS)  GDM screen -   Vaccines - [ ]Flu [  "]TDAP  GBS  [ ]Consents  Contraception -  Peds -   Circ -            Beta Thalasemia Trait (per previous record-no labs on records just problem list) 2019 by Corin Mcgregor CNM No    HSV-2 seropositive 2017 by 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  Was tested with last pregnancy and told she was positive.  Per up to date "  If the woman is seropositive for HSV-2 or HSV-1 but has no history of genital herpes lesions, we do not offer suppressive antiviral therapy [2]. Use of suppressive antiviral therapy in this setting has not been studied"  (attempted getting labs/ records from previous provider to review labs/ cultures?)         10 weeks gestation of pregnancy 2019 by Corin Mcgregor CNM 10/2/2019 by Uyen Ortiz CNM            Reviewed anatomy ultrasound  Reviewed wt gain parameters for ABC, along with exercise/diet recommendations in pregnancy.  Encouraged prenatal education classes - schedule given.  Education regarding  labor warning s/s.  Reminded pt to have hgb electrophoresis drawn today.     Reviewed warning signs, normal FM, and how/when to call.    Follow-up: 4 weeks, call or present sooner PRN  Birth Center Risk Assessment: 0- Meets birth center guidelines    0- CNM management in ABC  1- CNM management on L&D  2- Consultation with OB to develop  plan of care  3- Collaborative CNM/OB management with delivery on L&D  Permanent referral of care to MD  "

## 2019-10-29 LAB
BACTERIA UR CULT: NORMAL
BACTERIA UR CULT: NORMAL

## 2019-10-29 NOTE — PROGRESS NOTES
23 y.o. female  at 18w1d  Continues to have increased nausea and vomiting and requests zofran as her insurance will not cover diclegis.  She struggles to eat/ keep food down.  She has not gained any weight yet.  She did not come in for NT screen desires quad screen alternative today. States she has had transportation issues.   + feeling flutters/FM, denies VB, LOF or cramping  Doing well without other.    TWG: -5lbs, increase eating over past week, but still vomiting daily.    Reviewed prenatal labs  Genetic testing quad today, desires carrier screening due to unspecified beta thallasemia trait history.  Anatomy US ordered  Reviewed warning signs, normal FM, pregnancy precautions and how/when to call.  RTC x 4 wks, call or present sooner prn.     Corin Mcgregor CNM, MSN  1:34 AM    Birth Center Risk Assessment: pending  0- CNM management in ABC  1- CNM management on L&D  2- Consultation with OB to develop plan of care  3- Collaborative CNM/OB management with delivery on L&D  4- Referral or transfer of care to MD

## 2019-10-30 LAB
HGB A2 MFR BLD HPLC: 5.4 % (ref 2.2–3.2)
HGB FRACT BLD ELPH-IMP: ABNORMAL
HGB FRACT BLD ELPH-IMP: ABNORMAL

## 2019-11-01 ENCOUNTER — PATIENT MESSAGE (OUTPATIENT)
Dept: OBSTETRICS AND GYNECOLOGY | Facility: CLINIC | Age: 24
End: 2019-11-01

## 2019-11-04 ENCOUNTER — DOCUMENTATION ONLY (OUTPATIENT)
Dept: OBSTETRICS AND GYNECOLOGY | Facility: CLINIC | Age: 24
End: 2019-11-04

## 2019-11-04 DIAGNOSIS — R55 SYNCOPE, UNSPECIFIED SYNCOPE TYPE: Primary | ICD-10-CM

## 2019-11-04 NOTE — PROGRESS NOTES
Date: 11/04/2019    Reviewed patient medical and obstetrical history with Dr. Perry.     Patients History is significant for multiple syncopal episodes. (See note from 8/18/2019)      Dr. Perry recommendations for patient:  Refer to Cardiology

## 2019-11-06 ENCOUNTER — TELEPHONE (OUTPATIENT)
Dept: CARDIOLOGY | Facility: CLINIC | Age: 24
End: 2019-11-06

## 2019-11-06 DIAGNOSIS — R69 DIAGNOSIS DEFERRED: Primary | ICD-10-CM

## 2019-11-07 ENCOUNTER — HOSPITAL ENCOUNTER (OUTPATIENT)
Dept: CARDIOLOGY | Facility: OTHER | Age: 24
Discharge: HOME | End: 2019-11-07
Attending: INTERNAL MEDICINE
Payer: MEDICAID

## 2019-11-07 ENCOUNTER — OFFICE VISIT (OUTPATIENT)
Dept: CARDIOLOGY | Facility: CLINIC | Age: 24
End: 2019-11-07
Attending: INTERNAL MEDICINE
Payer: MEDICAID

## 2019-11-07 VITALS
OXYGEN SATURATION: 98 % | BODY MASS INDEX: 27.85 KG/M2 | HEART RATE: 74 BPM | DIASTOLIC BLOOD PRESSURE: 63 MMHG | HEIGHT: 69 IN | SYSTOLIC BLOOD PRESSURE: 108 MMHG | WEIGHT: 188 LBS

## 2019-11-07 DIAGNOSIS — Z34.90 PREGNANCY WITH ONE FETUS, ANTEPARTUM: Primary | ICD-10-CM

## 2019-11-07 DIAGNOSIS — R55 SYNCOPE AND COLLAPSE: ICD-10-CM

## 2019-11-07 DIAGNOSIS — R69 DIAGNOSIS DEFERRED: ICD-10-CM

## 2019-11-07 PROCEDURE — 99204 PR OFFICE/OUTPT VISIT, NEW, LEVL IV, 45-59 MIN: ICD-10-PCS | Mod: S$GLB,,, | Performed by: INTERNAL MEDICINE

## 2019-11-07 PROCEDURE — 99204 OFFICE O/P NEW MOD 45 MIN: CPT | Mod: S$GLB,,, | Performed by: INTERNAL MEDICINE

## 2019-11-07 NOTE — PROGRESS NOTES
Subjective:   Chief Complaint: Loss of Consciousness  Last Clinic Visit: New Patient    History of Present Illness: Stephy Spaulding is a 23 y.o. lady with no significant past medical history of presents to Cardiology Clinic to discuss episode of syncope.  She reports she has had several episodes of syncope throughout her life, the most recent happening in June of 2019.  She reports standing in her kitchen working trying to open the jar, when suddenly she felt her hand cramp up, and then passed out.  Denies any prodromal palpitations, no chest pain, did have some lightheadedness.  She hit her head, but no significant trauma.  No loss of bowel or bladder, and was out for about a minute.  No postictal confusion, no described seizure-like activity.  She had another episode like this approximately one month later, resolved spontaneously.  Has not had an episode since.  She had prior episodes once when she was 17, and once when she was 13.  Sought ER care for these, was told that she was dehydrated.  Issues of pregnancy of low weight, otherwise no significant issues.  She does report occasional fluttering in her chest proximally once a month, not associated with lightheadedness or shortness of breath, the resolved spontaneously.  Denies any history of recorded low blood pressures.  No family history of sudden cardiac death.    PMHx:  Syncope  Pregnancy    Review of Systems   Constitution: Negative.   HENT: Negative.    Eyes: Negative.    Cardiovascular: Positive for syncope.   Respiratory: Negative.    Hematologic/Lymphatic: Negative.    Skin: Negative.    Musculoskeletal: Negative.    Gastrointestinal: Negative.    Genitourinary: Negative.      Medications:  Current Outpatient Medications on File Prior to Visit   Medication Sig    PNV no.95/ferrous fum/folic ac (PRENATAL ORAL) Take 1 tablet by mouth.    valacyclovir (VALTREX) 500 MG tablet Take 1 tablet (500 mg total) by mouth 2 (two) times daily. (Patient not taking:  "Reported on 11/7/2019)    [DISCONTINUED] doxylamine-pyridoxine, vit B6, (DICLEGIS) 10-10 mg TbEC 2 Tabs at HS nightly, then 1po in am, 1 po in afternoon.  (total of 4 tabs daily) (Patient not taking: Reported on 10/28/2019)    [DISCONTINUED] iron polysaccharides (NIFEREX) 150 mg iron Cap Take 1 capsule (150 mg total) by mouth once daily. (Patient not taking: Reported on 10/28/2019)    [DISCONTINUED] ondansetron (ZOFRAN) 4 MG tablet Take 2 tablets (8 mg total) by mouth every 8 (eight) hours as needed for Nausea. (Patient not taking: Reported on 10/28/2019)     No current facility-administered medications on file prior to visit.      Family History:  Stephy's family history includes Emphysema in her paternal grandfather; Restless legs syndrome in her maternal grandmother and mother; Sickle cell trait in her sister.    Social History:  Stephy reports that she has quit smoking. Her smoking use included cigarettes. She has never used smokeless tobacco. She reports that she does not drink alcohol or use drugs.    Objective:   /63   Pulse 74   Ht 5' 9" (1.753 m)   Wt 85.3 kg (188 lb)   LMP 05/26/2019   SpO2 98%   BMI 27.76 kg/m²     Physical Exam   Constitutional: She is oriented to person, place, and time and well-developed, well-nourished, and in no distress. No distress.   HENT:   Head: Normocephalic and atraumatic.   Mouth/Throat: No oropharyngeal exudate.   Eyes: EOM are normal. No scleral icterus.   Neck: No JVD present. No tracheal deviation present. No thyromegaly present.   Cardiovascular: Normal rate and regular rhythm. Exam reveals no gallop and no friction rub.   Murmur (Flow murmur) heard.  Pulmonary/Chest: Effort normal and breath sounds normal. No respiratory distress. She has no wheezes. She has no rales. She exhibits no tenderness.   Abdominal: Soft. She exhibits no distension. There is no tenderness. There is no rebound and no guarding.   Musculoskeletal: Normal range of motion. She exhibits " no edema.   Neurological: She is alert and oriented to person, place, and time.   Skin: Skin is warm and dry. She is not diaphoretic. No erythema.   Psychiatric: Affect normal.     EKG:  My independent visualization of most recent EKG is pending, performed, but not uploaded for some reason.    TTE:  Pending  Lipids:       Renal:  Recent Labs   Lab 08/06/19  1004   Potassium 3.5   CO2 22 L   BUN, Bld 11   Creatinine 0.7     Liver:        Assessment:     1. Pregnancy with one fetus, antepartum    2. Syncope and collapse      Plan:   1. Pregnancy with one fetus, antepartum  Will obtain echocardiogram given murmur although likely flow murmur pregnancy.  - Echo Color Flow Doppler? Yes; Future    2. Syncope and collapse  Will obtain echocardiogram to rule out structural heart disease, which would make workup for syncope more concerning.  Otherwise this appears to be likely vagal episode, without any clear trigger.  Could perform it event monitoring to rule out arrhythmia, but given that it happened so infrequently, with need long-term monitoring with implantable loop recorder.  Do not feel like that is indicated based on current clinical scenario.  - Echo Color Flow Doppler? Yes; Future      Follow up if symptoms worsen or fail to improve.

## 2019-11-07 NOTE — LETTER
November 7, 2019      Uyen Ortiz, TRAVIS  [address] [facility] [address]           [facility]  [address]  [place] [address]  Phone: [phone]  Fax: [phone]          Patient: Stephy Spaulding   MR Number: [id number]   YOB: 1995   Date of Visit: 11/7/2019       Dear Uyen Ortiz:    Thank you for referring Stephy Spaulding to me for evaluation. Attached you will find relevant portions of my assessment and plan of care.    If you have questions, please do not hesitate to call me. I look forward to following Stephy Spaulding along with you.    Sincerely,    Marty Hernandez MD    Enclosure  CC:  No Recipients    If you would like to receive this communication electronically, please contact [email or web address] or [phone] to request more information on [facility] access.    For providers and/or their staff who would like to refer a patient to [facility], please contact us through our one-stop-shop provider referral line, [facility], at [phone].    If you feel you have received this communication in error or would no longer like to receive these types of communications, please e-mail [email or web address]

## 2019-11-12 ENCOUNTER — TELEPHONE (OUTPATIENT)
Dept: OBSTETRICS AND GYNECOLOGY | Facility: CLINIC | Age: 24
End: 2019-11-12

## 2019-11-12 DIAGNOSIS — D56.3 BETA MINOR THALASSEMIA: Primary | ICD-10-CM

## 2019-11-12 NOTE — TELEPHONE ENCOUNTER
"Called patient to discuss results of her hemoglobinopathy genetic screening that came back positive.  Per previous hgb electrophoresis we had pre-existing knowledge that this patient was beta thalsemia trait, and this result confirmed the specific genetic trait.  Patient given option to see hematology and speak with genetics via [facility].  She is interested in seeing hematology and referral is placed.  Patient is uncertain about her family history.   Patient states she has never been fully educated on this condition and "what it means" or where it comes from.  She agrees a specialist consult might be best for this information.    At next office visit will provide the genetic counselor info for patient as well, which is on the integrated genetics results page.    Corin Mcgregor CNM, MSN  11/24/2019  3:03 PM      "

## 2019-12-09 ENCOUNTER — TELEPHONE (OUTPATIENT)
Dept: HEMATOLOGY/ONCOLOGY | Facility: CLINIC | Age: 24
End: 2019-12-09

## 2019-12-09 ENCOUNTER — ROUTINE PRENATAL (OUTPATIENT)
Dept: OBSTETRICS AND GYNECOLOGY | Facility: CLINIC | Age: 24
End: 2019-12-09
Payer: MEDICAID

## 2019-12-09 DIAGNOSIS — R30.0 DYSURIA: ICD-10-CM

## 2019-12-09 DIAGNOSIS — Z34.93 PRENATAL CARE IN THIRD TRIMESTER: Primary | ICD-10-CM

## 2019-12-09 PROBLEM — D56.3 BETA THALASSEMIA MINOR: Status: ACTIVE | Noted: 2019-08-08

## 2019-12-09 PROCEDURE — 87086 URINE CULTURE/COLONY COUNT: CPT

## 2019-12-09 PROCEDURE — 99213 PR OFFICE/OUTPT VISIT, EST, LEVL III, 20-29 MIN: ICD-10-PCS | Mod: S$PBB,TH,, | Performed by: ADVANCED PRACTICE MIDWIFE

## 2019-12-09 PROCEDURE — 99212 OFFICE O/P EST SF 10 MIN: CPT | Mod: PBBFAC | Performed by: ADVANCED PRACTICE MIDWIFE

## 2019-12-09 PROCEDURE — 87088 URINE BACTERIA CULTURE: CPT

## 2019-12-09 PROCEDURE — 99213 OFFICE O/P EST LOW 20 MIN: CPT | Mod: S$PBB,TH,, | Performed by: ADVANCED PRACTICE MIDWIFE

## 2019-12-09 PROCEDURE — 87147 CULTURE TYPE IMMUNOLOGIC: CPT

## 2019-12-09 PROCEDURE — 99999 PR PBB SHADOW E&M-EST. PATIENT-LVL II: ICD-10-PCS | Mod: PBBFAC,,, | Performed by: ADVANCED PRACTICE MIDWIFE

## 2019-12-09 PROCEDURE — 99999 PR PBB SHADOW E&M-EST. PATIENT-LVL II: CPT | Mod: PBBFAC,,, | Performed by: ADVANCED PRACTICE MIDWIFE

## 2019-12-09 NOTE — PROGRESS NOTES
Chief Complaint   Patient presents with    Routine Prenatal Visit       23 y.o. female  at 28w1d, by Estimated Date of Delivery: 3/1/20  Patient admits to transportation issues (no care) and discussed importance of regular prenatal care and gave patient some resources today including medicaid transport/ patient social resource packet (due to transportation concerns mentioned today).  Complaints today: . Doing well today.   Reviewed TWG: -2 lbs  C/o pain with urination x couple days   ROS  OBSTETRICS:   Contractions No   Bleeding No   Loss of fluid No   Fetal mvmnt present  GASTRO:   Nausea No   Vomiting No      OB History    Para Term  AB Living   3 2 2     2   SAB TAB Ectopic Multiple Live Births         0 1      # Outcome Date GA Lbr Dayron/2nd Weight Sex Delivery Anes PTL Lv   3 Current            2 Term 17 39w5d / 00:05 3.827 kg (8 lb 7 oz) F Vag-Spont None N HENRIK   1 Term 16 41w0d  3.033 kg (6 lb 11 oz) F Vag-Spont EPI N        Dating reviewed  Allergies and problem list reviewed and updated  Medical and surgical history reviewed  Prenatal labs reviewed and updated    PHYSICAL EXAM  /74   Wt 85.1 kg (187 lb 9.8 oz)   LMP 2019   BMI 27.71 kg/m²     GENERAL: No acute distress  HEENT: Normocephalic, atraumatic  NEURO: Alert and oriented x3  PSYCH: Normal mood and affect  PULMONARY: Non-labored respiration; no tachypnea  ABD: Soft, gravid, nontender      ASSESSMENT AND PLAN    Pregnancy #3 Problems (from 19 to present)     Problem Noted Resolved    Pregnancy with one fetus, antepartum 10/2/2019 by Uyen Ortiz CNM No    Overview Addendum 10/29/2019  1:36 AM by Corin Mcgregor CNM     Prepregnancy BMI: 28 - ABC max 38#  Pap: 2019 - NILM  Dating - per LMP consistent with 19 wk sonogram  Prenatal labs X  U/S - WNL   Aneuploidy screening - negative Quad screen  (AB POS)  GDM screen -   Vaccines - [ ]Flu [ ]TDAP  GBS  [ ]Consents  Contraception -  Peds -   Circ -  "           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  Was tested with last pregnancy and told she was positive.  Per up to date "  If the woman is seropositive for HSV-2 or HSV-1 but has no history of genital herpes lesions, we do not offer suppressive antiviral therapy [2]. Use of suppressive antiviral therapy in this setting has not been studied"  (attempted getting labs/ records from previous provider to review labs/ cultures?)         10 weeks gestation of pregnancy 2019 by Corin Mcgregor CNM 10/2/2019 by Uyen Ortiz CNM            Reviewed upcoming 28wk labs, (AB POS) and orders placed  Education regarding warning signs of PreEclampsia, reviewed normal FM,  labor precautions, and how/when to call.    Follow-up: 4 weeks, call or present sooner PRN    Facilitated getting appts today.  Corin Mcgregor CNM  "

## 2019-12-11 LAB — BACTERIA UR CULT: ABNORMAL

## 2019-12-12 ENCOUNTER — PATIENT MESSAGE (OUTPATIENT)
Dept: OBSTETRICS AND GYNECOLOGY | Facility: CLINIC | Age: 24
End: 2019-12-12

## 2019-12-12 DIAGNOSIS — B95.1 GROUP B STREPTOCOCCUS URINARY TRACT INFECTION AFFECTING PREGNANCY, ANTEPARTUM: Primary | ICD-10-CM

## 2019-12-12 DIAGNOSIS — O23.40 GROUP B STREPTOCOCCUS URINARY TRACT INFECTION AFFECTING PREGNANCY, ANTEPARTUM: Primary | ICD-10-CM

## 2019-12-12 PROBLEM — O23.43 GROUP B STREPTOCOCCUS URINARY TRACT INFECTION AFFECTING PREGNANCY IN THIRD TRIMESTER: Status: ACTIVE | Noted: 2017-09-25

## 2019-12-12 RX ORDER — AMOXICILLIN 500 MG/1
500 CAPSULE ORAL EVERY 12 HOURS
Qty: 14 CAPSULE | Refills: 0 | Status: SHIPPED | OUTPATIENT
Start: 2019-12-12 | End: 2019-12-19

## 2019-12-13 NOTE — ASSESSMENT & PLAN NOTE
ABX therapy sent (12/12) by ZORAIDA Mata CNM and plan to treat in labor. Pt message sent regarding as pt phone not functioning.

## 2020-01-06 ENCOUNTER — TELEPHONE (OUTPATIENT)
Dept: OBSTETRICS AND GYNECOLOGY | Facility: CLINIC | Age: 25
End: 2020-01-06

## 2020-01-06 VITALS
BODY MASS INDEX: 27.71 KG/M2 | WEIGHT: 187.63 LBS | SYSTOLIC BLOOD PRESSURE: 112 MMHG | DIASTOLIC BLOOD PRESSURE: 72 MMHG

## 2020-01-06 NOTE — TELEPHONE ENCOUNTER
"  SUBJECTIVE:     Andreea Gross is a 2 week old female, here for a routine health maintenance visit,   accompanied by her mother    Patient was roomed by: DUNCAN Collins    Do you have any forms to be completed?  no    BIRTH HISTORY  Patient Active Problem List     Birth     Length: 1' 8\" (0.508 m)     Weight: 7 lb 3 oz (3.26 kg)     HC 13.27\" (33.7 cm)     Apgar     One: 9     Five: 9     Discharge Weight: 7 lb 2.8 oz (3.255 kg)     Delivery Method:      Gestation Age: 39 6/7 wks     Feeding: Breast Fed     Days in Hospital: 2     Hospital Name: [facility]     Term AGA female born to 32yo  mother.   Maternal +GBS, adequately treated.     Hep B given 2917  CCHD screen passed, hearing passed bilaterally.   Bili HIR zone at discharge (TCB 10.0 at 36hrs of age)     Hepatitis B # 1 given in nursery: yes  [place] metabolic screening: Results Not Known at this time   hearing screen: Passed--data reviewed     SOCIAL HISTORY  Child lives with: mother, father and brother  Who takes care of your infant: mother  Language(s) spoken at home: English  Recent family changes/social stressors: none noted    SAFETY/HEALTH RISK  Does anyone who takes care of your child smoke?:  No  TB exposure:  No  Is your car seat less than 6 years old, in the back seat, rear-facing, 5-point restraint:  Yes    DAILY ACTIVITIES  WATER SOURCE: city water and FILTERED WATER    NUTRITION  Breastfeeding:breastfeeding q 2-3.5 hrs, 10-40 minutes/side and pumped breastmilk   Good milk supply, pumping after patient finished nursing.       SLEEP  Arrangements:    haydent  Problems    none    ELIMINATION  Stools:    # per day: 3-4 soft yellow/green seedy   Urination:    # wet diapers/day: after each feeding     QUESTIONS/CONCERNS: None    ==================      PROBLEM LIST  Patient Active Problem List   Diagnosis     Term birth of female        MEDICATIONS  Current Outpatient Prescriptions   Medication Sig Dispense Refill     " Called Stephy to make appointment for prenatal care.  Reached vm.  Left pt message advising to return call to clinic    10 AM contacted pt and appointment rescheduled.  Pt also asked about doing Jelly Beans instead of glucola for OB glucose scree,  Pt was advised needs to bring in Br[employer] jelly beans to lab for testing.  Pt verbalized understanding         "Cholecalciferol (VITAMIN D3) 400 UNIT/ML LIQD Take 400 Units by mouth          ALLERGY  No Known Allergies    IMMUNIZATIONS    There is no immunization history on file for this patient.    HEALTH HISTORY  No major problems since discharge from nursery    DEVELOPMENT  Milestones (by observation/ exam/ report. 75-90% ile):   PERSONAL/ SOCIAL/COGNITIVE:    Regards face    Spontaneous smile  LANGUAGE:    Vocalizes    Responds to sound  GROSS MOTOR:    Equal movements    Lifts head  FINE MOTOR/ ADAPTIVE:    Reflexive grasp    Visually fixates    ROS  GENERAL: See health history, nutrition and daily activities   SKIN:  No  significant rash or lesions.  HEENT: Hearing/vision: see above.  No eye, nasal, ear concerns  RESP: No cough or other concerns  CV: No concerns  GI: See nutrition and elimination. No concerns.  : See elimination. No concerns  NEURO: See development    OBJECTIVE:                                                    EXAM  Pulse 161  Temp 97.9  F (36.6  C) (Axillary)  Ht 1' 9.1\" (0.536 m)  Wt 8 lb 2.5 oz (3.7 kg)  HC 14.37\" (36.5 cm)  SpO2 98%  BMI 12.88 kg/m2  89 %ile based on WHO (Girls, 0-2 years) length-for-age data using vitals from 2017.  51 %ile based on WHO (Girls, 0-2 years) weight-for-age data using vitals from 2017.  88 %ile based on WHO (Girls, 0-2 years) head circumference-for-age data using vitals from 2017.  GENERAL: Active, alert,  no  distress.  SKIN: Clear. No significant rash, abnormal pigmentation or lesions.  HEAD: Normocephalic. Normal fontanels and sutures.  EYES: Conjunctivae and cornea normal. Red reflexes present bilaterally.  EARS: normal: no effusions, no erythema, normal landmarks  NOSE: Normal without discharge.  MOUTH/THROAT: Clear. No oral lesions.  NECK: Supple, no masses.  LYMPH NODES: No adenopathy  LUNGS: Clear. No rales, rhonchi, wheezing or retractions  HEART: Regular rate and rhythm. Normal S1/S2. No murmurs. Normal femoral pulses.  ABDOMEN: Soft, " non-tender, not distended, no masses or hepatosplenomegaly. Normal umbilicus and bowel sounds.   GENITALIA: Normal female external genitalia. Kiel stage I,  No inguinal herniae are present.  EXTREMITIES: Hips normal with negative Ortolani and Sandoval. Symmetric creases and  no deformities  NEUROLOGIC: Normal tone throughout. Normal reflexes for age    ASSESSMENT/PLAN:                                                    1. Encounter for routine child health examination without abnormal findings  Excellent weight gain demonstrated. Normal development.   Discussed breastfeeding, feeding routines, sleep patterns, fever protocol.   EPDS = 4, no concerns.   - Health Risk Assessment (91597)    Anticipatory Guidance  The following topics were discussed:  SOCIAL/FAMILY    sibling rivalry    responding to cry/ fussiness    calming techniques    postpartum depression / fatigue    advice from others  NUTRITION:    delay solid food    pumping/ introduce bottle    no honey before one year    always hold to feed/ never prop bottle    vit D if breastfeeding    sucking needs/ pacifier    breastfeeding issues  HEALTH/ SAFETY:    sleep habits    dressing    bulb syringe    rashes    temperature taking    car seat    falls    safe crib environment    sleep on back    Preventive Care Plan  Immunizations     Reviewed, up to date  Referrals/Ongoing Specialty care: No   See other orders in EpicCare    FOLLOW-UP:      2 month Preventive Care visit or as needed in interim.     RAVINDER Mccrary [facility]

## 2020-01-08 ENCOUNTER — ROUTINE PRENATAL (OUTPATIENT)
Dept: OBSTETRICS AND GYNECOLOGY | Facility: CLINIC | Age: 25
End: 2020-01-08
Payer: MEDICAID

## 2020-01-08 ENCOUNTER — LAB VISIT (OUTPATIENT)
Dept: LAB | Facility: OTHER | Age: 25
End: 2020-01-08
Attending: ADVANCED PRACTICE MIDWIFE
Payer: MEDICAID

## 2020-01-08 VITALS
SYSTOLIC BLOOD PRESSURE: 128 MMHG | WEIGHT: 195.44 LBS | BODY MASS INDEX: 28.86 KG/M2 | DIASTOLIC BLOOD PRESSURE: 60 MMHG

## 2020-01-08 DIAGNOSIS — Z34.93 PRENATAL CARE IN THIRD TRIMESTER: ICD-10-CM

## 2020-01-08 DIAGNOSIS — Z34.90 PREGNANCY WITH ONE FETUS, ANTEPARTUM: Primary | ICD-10-CM

## 2020-01-08 DIAGNOSIS — A60.9 HSV (HERPES SIMPLEX VIRUS) ANOGENITAL INFECTION: ICD-10-CM

## 2020-01-08 DIAGNOSIS — D56.3 BETA MINOR THALASSEMIA: ICD-10-CM

## 2020-01-08 LAB
BASOPHILS # BLD AUTO: 0.02 K/UL (ref 0–0.2)
BASOPHILS NFR BLD: 0.2 % (ref 0–1.9)
DIFFERENTIAL METHOD: ABNORMAL
EOSINOPHIL # BLD AUTO: 0.1 K/UL (ref 0–0.5)
EOSINOPHIL NFR BLD: 0.6 % (ref 0–8)
ERYTHROCYTE [DISTWIDTH] IN BLOOD BY AUTOMATED COUNT: 15.9 % (ref 11.5–14.5)
GLUCOSE SERPL-MCNC: 101 MG/DL (ref 70–140)
HCT VFR BLD AUTO: 29.8 % (ref 37–48.5)
HGB BLD-MCNC: 8.9 G/DL (ref 12–16)
IMM GRANULOCYTES # BLD AUTO: 0.07 K/UL (ref 0–0.04)
IMM GRANULOCYTES NFR BLD AUTO: 0.6 % (ref 0–0.5)
LYMPHOCYTES # BLD AUTO: 2.3 K/UL (ref 1–4.8)
LYMPHOCYTES NFR BLD: 18.8 % (ref 18–48)
MCH RBC QN AUTO: 21.3 PG (ref 27–31)
MCHC RBC AUTO-ENTMCNC: 29.9 G/DL (ref 32–36)
MCV RBC AUTO: 72 FL (ref 82–98)
MONOCYTES # BLD AUTO: 0.4 K/UL (ref 0.3–1)
MONOCYTES NFR BLD: 3.4 % (ref 4–15)
NEUTROPHILS # BLD AUTO: 9.5 K/UL (ref 1.8–7.7)
NEUTROPHILS NFR BLD: 76.4 % (ref 38–73)
NRBC BLD-RTO: 0 /100 WBC
PLATELET # BLD AUTO: 317 K/UL (ref 150–350)
PMV BLD AUTO: 10 FL (ref 9.2–12.9)
RBC # BLD AUTO: 4.17 M/UL (ref 4–5.4)
WBC # BLD AUTO: 12.39 K/UL (ref 3.9–12.7)

## 2020-01-08 PROCEDURE — 83540 ASSAY OF IRON: CPT

## 2020-01-08 PROCEDURE — 99213 PR OFFICE/OUTPT VISIT, EST, LEVL III, 20-29 MIN: ICD-10-PCS | Mod: TH,S$PBB,, | Performed by: ADVANCED PRACTICE MIDWIFE

## 2020-01-08 PROCEDURE — 36415 COLL VENOUS BLD VENIPUNCTURE: CPT

## 2020-01-08 PROCEDURE — 85025 COMPLETE CBC W/AUTO DIFF WBC: CPT

## 2020-01-08 PROCEDURE — 82728 ASSAY OF FERRITIN: CPT

## 2020-01-08 PROCEDURE — 99999 PR PBB SHADOW E&M-EST. PATIENT-LVL I: CPT | Mod: PBBFAC,,, | Performed by: ADVANCED PRACTICE MIDWIFE

## 2020-01-08 PROCEDURE — 99999 PR PBB SHADOW E&M-EST. PATIENT-LVL I: ICD-10-PCS | Mod: PBBFAC,,, | Performed by: ADVANCED PRACTICE MIDWIFE

## 2020-01-08 PROCEDURE — 99213 OFFICE O/P EST LOW 20 MIN: CPT | Mod: TH,S$PBB,, | Performed by: ADVANCED PRACTICE MIDWIFE

## 2020-01-08 PROCEDURE — 99211 OFF/OP EST MAY X REQ PHY/QHP: CPT | Mod: PBBFAC,TH | Performed by: ADVANCED PRACTICE MIDWIFE

## 2020-01-08 PROCEDURE — 82950 GLUCOSE TEST: CPT

## 2020-01-08 RX ORDER — VALACYCLOVIR HYDROCHLORIDE 500 MG/1
500 TABLET, FILM COATED ORAL 2 TIMES DAILY
Qty: 10 TABLET | Refills: 3 | Status: SHIPPED | OUTPATIENT
Start: 2020-01-08 | End: 2020-02-10 | Stop reason: SDUPTHER

## 2020-01-08 NOTE — PROGRESS NOTES
No chief complaint on file.      24 y.o. female  at 32w3d, by Estimated Date of Delivery: 3/1/20    Complaints today: Here. Doing well today. Saw Cardiology, EKG normal,   Has not seen Hem/On, will reschedule. Just got vehicle so can make visits now. Was nauseous, but way better now.   Has missed 3 visits due to transportation, discussed need to make all of the rest of her appointments  Reviewed TW lbs    ROS  OBSTETRICS:   Contractions No   Bleeding No   Loss of fluid No   Fetal mvmnt yes  GASTRO:   Nausea No   Vomiting No      OB History    Para Term  AB Living   3 2 2     2   SAB TAB Ectopic Multiple Live Births         0 1      # Outcome Date GA Lbr Dayron/2nd Weight Sex Delivery Anes PTL Lv   3 Current            2 Term 17 39w5d / 00:05 3.827 kg (8 lb 7 oz) F Vag-Spont None N HENRIK   1 Term 16 41w0d  3.033 kg (6 lb 11 oz) F Vag-Spont EPI N        Dating reviewed  Allergies and problem list reviewed and updated  Medical and surgical history reviewed  Prenatal labs reviewed and updated    PHYSICAL EXAM  /60   Wt 88.6 kg (195 lb 7 oz)   LMP 2019   BMI 28.86 kg/m²     GENERAL: No acute distress  HEENT: Normocephalic, atraumatic  NEURO: Alert and oriented x3  PSYCH: Normal mood and affect  PULMONARY: Non-labored respiration; no tachypnea  ABD: Soft, gravid, nontender.      ASSESSMENT AND PLAN    Pregnancy #3 Problems (from 19 to present)     Problem Noted Resolved    Pregnancy with one fetus, antepartum 10/2/2019 by Uyen Ortiz CNM No    Overview Addendum 10/29/2019  1:36 AM by Corin Mcgregor CNM     Prepregnancy BMI: 28 - ABC max 38#  Pap: 2019 - NILM  Dating - per LMP consistent with 19 wk sonogram  Prenatal labs X  U/S - WNL   Aneuploidy screening - negative Quad screen  (AB POS)  GDM screen -   Vaccines - [ ]Flu [ ]TDAP  GBS  [ ]Consents  Contraception -  Peds -   Circ -            Beta thalassemia minor 2019 by Corin Mcgregor CNM No    HSV-2  "seropositive 2017 by 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  Was tested with last pregnancy and told she was positive.  Per up to date "  If the woman is seropositive for HSV-2 or HSV-1 but has no history of genital herpes lesions, we do not offer suppressive antiviral therapy [2]. Use of suppressive antiviral therapy in this setting has not been studied"  (attempted getting labs/ records from previous provider to review labs/ cultures?)         10 weeks gestation of pregnancy 2019 by Corin Mcgregor CNM 10/2/2019 by Uyen Ortiz CNM          Patient plans to breast feed - reviewed breast feeding class here.  It's a ??! They do not desire circumcision.  Pediatrician: [facility] in [place]    Reviewed warning signs, normal FM,  labor precautions, and how/when to call. Confirmed pt has after-hours number.    Follow-up: 2 weeks, call or present sooner PRN  "

## 2020-01-09 ENCOUNTER — TELEPHONE (OUTPATIENT)
Dept: OBSTETRICS AND GYNECOLOGY | Facility: OTHER | Age: 25
End: 2020-01-09

## 2020-01-09 DIAGNOSIS — D50.9 IRON DEFICIENCY ANEMIA, UNSPECIFIED IRON DEFICIENCY ANEMIA TYPE: Primary | ICD-10-CM

## 2020-01-09 DIAGNOSIS — D56.3 BETA MINOR THALASSEMIA: Primary | ICD-10-CM

## 2020-01-09 DIAGNOSIS — D50.9 IRON DEFICIENCY ANEMIA, UNSPECIFIED IRON DEFICIENCY ANEMIA TYPE: ICD-10-CM

## 2020-01-09 LAB
FERRITIN SERPL-MCNC: 4 NG/ML (ref 20–300)
IRON SERPL-MCNC: 49 UG/DL (ref 30–160)
SATURATED IRON: 8 % (ref 20–50)
TOTAL IRON BINDING CAPACITY: 598 UG/DL (ref 250–450)
TRANSFERRIN SERPL-MCNC: 404 MG/DL (ref 200–375)

## 2020-01-22 ENCOUNTER — ROUTINE PRENATAL (OUTPATIENT)
Dept: OBSTETRICS AND GYNECOLOGY | Facility: CLINIC | Age: 25
End: 2020-01-22
Payer: MEDICAID

## 2020-01-22 VITALS
BODY MASS INDEX: 29.79 KG/M2 | WEIGHT: 201.75 LBS | SYSTOLIC BLOOD PRESSURE: 116 MMHG | DIASTOLIC BLOOD PRESSURE: 60 MMHG

## 2020-01-22 DIAGNOSIS — O99.013 ANEMIA DURING PREGNANCY IN THIRD TRIMESTER: ICD-10-CM

## 2020-01-22 DIAGNOSIS — Z87.440 HISTORY OF UTI: ICD-10-CM

## 2020-01-22 DIAGNOSIS — Z34.90 PREGNANCY, UNSPECIFIED GESTATIONAL AGE: Primary | ICD-10-CM

## 2020-01-22 PROCEDURE — 87086 URINE CULTURE/COLONY COUNT: CPT

## 2020-01-22 PROCEDURE — 99999 PR PBB SHADOW E&M-EST. PATIENT-LVL II: ICD-10-PCS | Mod: PBBFAC,,, | Performed by: ADVANCED PRACTICE MIDWIFE

## 2020-01-22 PROCEDURE — 99213 OFFICE O/P EST LOW 20 MIN: CPT | Mod: TH,S$PBB,, | Performed by: ADVANCED PRACTICE MIDWIFE

## 2020-01-22 PROCEDURE — 99212 OFFICE O/P EST SF 10 MIN: CPT | Mod: PBBFAC,TH | Performed by: ADVANCED PRACTICE MIDWIFE

## 2020-01-22 PROCEDURE — 99999 PR PBB SHADOW E&M-EST. PATIENT-LVL II: CPT | Mod: PBBFAC,,, | Performed by: ADVANCED PRACTICE MIDWIFE

## 2020-01-22 PROCEDURE — 99213 PR OFFICE/OUTPT VISIT, EST, LEVL III, 20-29 MIN: ICD-10-PCS | Mod: TH,S$PBB,, | Performed by: ADVANCED PRACTICE MIDWIFE

## 2020-01-22 RX ORDER — FERROUS SULFATE 325(65) MG
325 TABLET, DELAYED RELEASE (ENTERIC COATED) ORAL 2 TIMES DAILY
Qty: 60 TABLET | Refills: 3 | Status: SHIPPED | OUTPATIENT
Start: 2020-01-22 | End: 2021-01-21

## 2020-01-22 NOTE — PROGRESS NOTES
Chief Complaint   Patient presents with    Routine Prenatal Visit       24 y.o. female  at 34w3d, by Estimated Date of Delivery: 3/1/20    Complaints today: FOB here. Doing well today.Most challenging pregnancy  Reviewed TW lbs    ROS  OBSTETRICS:   Contractions No   Bleeding No   Loss of fluid No   Fetal mvmnt yes  GASTRO:   Nausea No   Vomiting No      OB History    Para Term  AB Living   3 2 2     2   SAB TAB Ectopic Multiple Live Births         0 1      # Outcome Date GA Lbr Dayron/2nd Weight Sex Delivery Anes PTL Lv   3 Current            2 Term 17 39w5d / 00:05 3.827 kg (8 lb 7 oz) F Vag-Spont None N HENRIK   1 Term 16 41w0d  3.033 kg (6 lb 11 oz) F Vag-Spont EPI N        Dating reviewed  Allergies and problem list reviewed and updated  Medical and surgical history reviewed  Prenatal labs reviewed and updated    PHYSICAL EXAM  /60   Wt 91.5 kg (201 lb 11.5 oz)   LMP 2019   BMI 29.79 kg/m²     GENERAL: No acute distress  HEENT: Normocephalic, atraumatic  NEURO: Alert and oriented x3  PSYCH: Normal mood and affect  PULMONARY: Non-labored respiration; no tachypnea  ABD: Soft, gravid, nontender.      ASSESSMENT AND PLAN    Pregnancy #3 Problems (from 19 to present)     Problem Noted Resolved    Iron deficiency anemia 2020 by Corin Mcgregor CNM No    Pregnancy with one fetus, antepartum 10/2/2019 by Uyen Ortiz CNM No    Overview Addendum 10/29/2019  1:36 AM by Corin Mcgregor CNM     Prepregnancy BMI: 28 - ABC max 38#  Pap: 2019 - NILM  Dating - per LMP consistent with 19 wk sonogram  Prenatal labs X  U/S - WNL   Aneuploidy screening - negative Quad screen  (AB POS)  GDM screen -   Vaccines - [x]Flu [x]TDAP  GBS  [ ]Consents  Contraception -  Peds - [facility]  Circ -            Beta thalassemia minor 2019 by Corin Mcgregor CNM No    HSV-2 seropositive 2017 by Joyce Lees CNM No    Overview Addendum 2019  "12:19 PM by Corin Mcgregor CNM     No hx genital or oral outbreaks.  Was tested with last pregnancy and told she was positive.  Per up to date "  If the woman is seropositive for HSV-2 or HSV-1 but has no history of genital herpes lesions, we do not offer suppressive antiviral therapy [2]. Use of suppressive antiviral therapy in this setting has not been studied"  (attempted getting labs/ records from previous provider to review labs/ cultures?)         10 weeks gestation of pregnancy 2019 by Corin Mcgregor CNM 10/2/2019 by Uyen Ortiz CNM          Reviewed upcoming 36wk labs .   Reviewed patient does have a history of genital HSV. Is on Valtrex    Reviewed ABC risk assessment with the patient:    Birth Center Risk Assessment: 1-management on labor and Delivery, until cleared from cardiology    0- CNM management in ABC  1- CNM management on L&D  2- Consultation with OB to develop  plan of care  3- Collaborative CNM/OB management with delivery on L&D  4- Permanent referral of care to MD    She understands she is ?? a candidate for the ABC. Reviewed potential risks which could arise, that could change this status.    Reviewed warning signs, normal FM,  labor precautions, and how/when to call. Confirmed pt has after-hours number.    Follow-up: 2 weeks, call or present sooner PRN  "

## 2020-01-23 LAB — BACTERIA UR CULT: NO GROWTH

## 2020-02-05 ENCOUNTER — ROUTINE PRENATAL (OUTPATIENT)
Dept: OBSTETRICS AND GYNECOLOGY | Facility: CLINIC | Age: 25
End: 2020-02-05
Payer: MEDICAID

## 2020-02-05 VITALS
DIASTOLIC BLOOD PRESSURE: 64 MMHG | SYSTOLIC BLOOD PRESSURE: 122 MMHG | WEIGHT: 203.63 LBS | BODY MASS INDEX: 30.07 KG/M2

## 2020-02-05 DIAGNOSIS — Z34.93 PRENATAL CARE IN THIRD TRIMESTER: Primary | ICD-10-CM

## 2020-02-05 PROCEDURE — 99212 OFFICE O/P EST SF 10 MIN: CPT | Mod: PBBFAC | Performed by: ADVANCED PRACTICE MIDWIFE

## 2020-02-05 PROCEDURE — 99213 OFFICE O/P EST LOW 20 MIN: CPT | Mod: S$PBB,TH,, | Performed by: ADVANCED PRACTICE MIDWIFE

## 2020-02-05 PROCEDURE — 99999 PR PBB SHADOW E&M-EST. PATIENT-LVL II: ICD-10-PCS | Mod: PBBFAC,,, | Performed by: ADVANCED PRACTICE MIDWIFE

## 2020-02-05 PROCEDURE — 99213 PR OFFICE/OUTPT VISIT, EST, LEVL III, 20-29 MIN: ICD-10-PCS | Mod: S$PBB,TH,, | Performed by: ADVANCED PRACTICE MIDWIFE

## 2020-02-05 PROCEDURE — 99999 PR PBB SHADOW E&M-EST. PATIENT-LVL II: CPT | Mod: PBBFAC,,, | Performed by: ADVANCED PRACTICE MIDWIFE

## 2020-02-05 PROCEDURE — 87081 CULTURE SCREEN ONLY: CPT

## 2020-02-07 ENCOUNTER — TELEPHONE (OUTPATIENT)
Dept: OBSTETRICS AND GYNECOLOGY | Facility: CLINIC | Age: 25
End: 2020-02-07

## 2020-02-07 ENCOUNTER — HOSPITAL ENCOUNTER (EMERGENCY)
Facility: OTHER | Age: 25
Discharge: HOME | End: 2020-02-08
Attending: OBSTETRICS & GYNECOLOGY
Payer: MEDICAID

## 2020-02-07 DIAGNOSIS — Z3A.36 36 WEEKS GESTATION OF PREGNANCY: ICD-10-CM

## 2020-02-07 DIAGNOSIS — R55 SYNCOPE, UNSPECIFIED SYNCOPE TYPE: Primary | ICD-10-CM

## 2020-02-07 DIAGNOSIS — R55 SYNCOPE: ICD-10-CM

## 2020-02-07 LAB
ALBUMIN SERPL BCP-MCNC: 2.8 G/DL (ref 3.5–5.2)
ALP SERPL-CCNC: 109 U/L (ref 55–135)
ALT SERPL W/O P-5'-P-CCNC: 5 U/L (ref 10–44)
ANION GAP SERPL CALC-SCNC: 9 MMOL/L (ref 8–16)
AST SERPL-CCNC: 11 U/L (ref 10–40)
BACTERIA SPEC AEROBE CULT: NORMAL
BASOPHILS # BLD AUTO: 0.02 K/UL (ref 0–0.2)
BASOPHILS NFR BLD: 0.2 % (ref 0–1.9)
BILIRUB SERPL-MCNC: 0.1 MG/DL (ref 0.1–1)
BUN SERPL-MCNC: 9 MG/DL (ref 6–20)
CALCIUM SERPL-MCNC: 8.7 MG/DL (ref 8.7–10.5)
CHLORIDE SERPL-SCNC: 107 MMOL/L (ref 95–110)
CO2 SERPL-SCNC: 23 MMOL/L (ref 23–29)
CREAT SERPL-MCNC: 0.7 MG/DL (ref 0.5–1.4)
DIFFERENTIAL METHOD: ABNORMAL
EOSINOPHIL # BLD AUTO: 0.1 K/UL (ref 0–0.5)
EOSINOPHIL NFR BLD: 1 % (ref 0–8)
ERYTHROCYTE [DISTWIDTH] IN BLOOD BY AUTOMATED COUNT: 16.5 % (ref 11.5–14.5)
EST. GFR  (AFRICAN AMERICAN): >60 ML/MIN/1.73 M^2
EST. GFR  (NON AFRICAN AMERICAN): >60 ML/MIN/1.73 M^2
GLUCOSE SERPL-MCNC: 66 MG/DL (ref 70–110)
HCT VFR BLD AUTO: 28.3 % (ref 37–48.5)
HGB BLD-MCNC: 9 G/DL (ref 12–16)
IMM GRANULOCYTES # BLD AUTO: 0.06 K/UL (ref 0–0.04)
IMM GRANULOCYTES NFR BLD AUTO: 0.5 % (ref 0–0.5)
LYMPHOCYTES # BLD AUTO: 2.6 K/UL (ref 1–4.8)
LYMPHOCYTES NFR BLD: 20.8 % (ref 18–48)
MCH RBC QN AUTO: 22.1 PG (ref 27–31)
MCHC RBC AUTO-ENTMCNC: 31.8 G/DL (ref 32–36)
MCV RBC AUTO: 69 FL (ref 82–98)
MONOCYTES # BLD AUTO: 0.6 K/UL (ref 0.3–1)
MONOCYTES NFR BLD: 5 % (ref 4–15)
NEUTROPHILS # BLD AUTO: 9 K/UL (ref 1.8–7.7)
NEUTROPHILS NFR BLD: 72.5 % (ref 38–73)
NRBC BLD-RTO: 0 /100 WBC
PLATELET # BLD AUTO: 309 K/UL (ref 150–350)
PMV BLD AUTO: 9.7 FL (ref 9.2–12.9)
POTASSIUM SERPL-SCNC: 3.3 MMOL/L (ref 3.5–5.1)
PROT SERPL-MCNC: 6.9 G/DL (ref 6–8.4)
RBC # BLD AUTO: 4.08 M/UL (ref 4–5.4)
SODIUM SERPL-SCNC: 139 MMOL/L (ref 136–145)
WBC # BLD AUTO: 12.34 K/UL (ref 3.9–12.7)

## 2020-02-07 PROCEDURE — 99284 PR EMERGENCY DEPT VISIT,LEVEL IV: ICD-10-PCS | Mod: 25,,, | Performed by: OBSTETRICS & GYNECOLOGY

## 2020-02-07 PROCEDURE — 85025 COMPLETE CBC W/AUTO DIFF WBC: CPT

## 2020-02-07 PROCEDURE — 80053 COMPREHEN METABOLIC PANEL: CPT

## 2020-02-07 PROCEDURE — 59025 FETAL NON-STRESS TEST: CPT | Mod: 26,,, | Performed by: OBSTETRICS & GYNECOLOGY

## 2020-02-07 PROCEDURE — 59025 FETAL NON-STRESS TEST: CPT

## 2020-02-07 PROCEDURE — 99284 EMERGENCY DEPT VISIT MOD MDM: CPT | Mod: 25

## 2020-02-07 PROCEDURE — 82962 GLUCOSE BLOOD TEST: CPT

## 2020-02-07 PROCEDURE — 59025 PR FETAL 2N-STRESS TEST: ICD-10-PCS | Mod: 26,,, | Performed by: OBSTETRICS & GYNECOLOGY

## 2020-02-07 PROCEDURE — 99284 EMERGENCY DEPT VISIT MOD MDM: CPT | Mod: 25,,, | Performed by: OBSTETRICS & GYNECOLOGY

## 2020-02-08 VITALS
SYSTOLIC BLOOD PRESSURE: 97 MMHG | TEMPERATURE: 98 F | DIASTOLIC BLOOD PRESSURE: 48 MMHG | OXYGEN SATURATION: 98 % | HEART RATE: 80 BPM | RESPIRATION RATE: 16 BRPM

## 2020-02-08 LAB — POCT GLUCOSE: 71 MG/DL (ref 70–110)

## 2020-02-08 PROCEDURE — 93010 EKG 12-LEAD: ICD-10-PCS | Mod: ,,, | Performed by: INTERNAL MEDICINE

## 2020-02-08 PROCEDURE — 93005 ELECTROCARDIOGRAM TRACING: CPT

## 2020-02-08 PROCEDURE — 93010 ELECTROCARDIOGRAM REPORT: CPT | Mod: ,,, | Performed by: INTERNAL MEDICINE

## 2020-02-08 RX ORDER — POTASSIUM CHLORIDE 20 MEQ/1
40 TABLET, EXTENDED RELEASE ORAL ONCE
Status: DISCONTINUED | OUTPATIENT
Start: 2020-02-08 | End: 2020-02-08 | Stop reason: HOSPADM
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
## 2020-02-08 NOTE — ED PROVIDER NOTES
"Encounter Date: 2020       History     Chief Complaint   Patient presents with    Loss of Consciousness     Stephy Spaulding is a 24 y.o. [id number] at 36w5d presents complaining of a syncopal episode. Patient has had multiple syncopal episodes this pregnancy and has seen cardiology outpatient - who recommended an echocardiogram. This has not yet been performed.       Patient denies hitting head, her partner was present when she experienced syncope and caught her. Patient reports eating 30 minutes prior to syncopal episode, but water intake during the day has been limited. Patient reports she is experiencing victoriano tucker contractions. Denies loss of fluid, vaginal bleeding, and reports still feeling fetal movement.     Patient denies any recent changes in vision, headaches, unusual exacerbations of SOB, chest pain, abdominal pain, urinary symptoms, or vaginal discharge. Patient reports being constipated.     Patient denies feelings of heart palpitations and had a mild headache once she regained consciousness but no other symptoms.          This IUP is complicated by HSV, GBS.  Patient denies contractions, denies vaginal bleeding, denies LOF.   Fetal Movement: normal.        Review of patient's allergies indicates:  No Known Allergies  Past Medical History:   Diagnosis Date    Asthma     as child only    Herpes simplex virus (HSV) infection     Has never had a lesion. States she had a "positive blood test" during her first pregnancy,? type    Syncopal episodes     Bradycardia-induced  6 months ago, one other episode as teenager (not dx with same cause)     Past Surgical History:   Procedure Laterality Date    NO PAST SURGERIES       Family History   Problem Relation Age of Onset    Emphysema Paternal Grandfather     Restless legs syndrome Maternal Grandmother     Restless legs syndrome Mother     Sickle cell trait Sister     Breast cancer Neg Hx     Colon cancer Neg Hx     Ovarian cancer Neg Hx      Social " History     Tobacco Use    Smoking status: Former Smoker     Types: Cigarettes    Smokeless tobacco: Never Used   Substance Use Topics    Alcohol use: No     Comment: not with pregnancy    Drug use: No     Review of Systems   Constitutional: Negative for chills and fever.   HENT: Negative for facial swelling.    Eyes: Negative for visual disturbance.   Respiratory: Negative for chest tightness and shortness of breath.    Cardiovascular: Negative for chest pain.   Gastrointestinal: Negative for abdominal pain, nausea and vomiting.   Genitourinary: Negative for dysuria, vaginal bleeding and vaginal discharge.   Skin: Negative.    Neurological: Positive for syncope. Negative for headaches.   Psychiatric/Behavioral: Negative.        Physical Exam     Initial Vitals   BP Pulse Resp Temp SpO2   02/07/20 2243 02/07/20 2243 02/07/20 2258 02/07/20 2242 02/07/20 2243   (!) 114/54 (!) 59 16 97.5 °F (36.4 °C) 100 %      MAP       --                Physical Exam    Vitals reviewed.  Constitutional: She appears well-developed and well-nourished. She is not diaphoretic. No distress.   HENT:   Head: Normocephalic and atraumatic.   Eyes: EOM are normal. Right eye exhibits no discharge. Left eye exhibits no discharge.   Neck: Normal range of motion. Neck supple.   Cardiovascular: Normal rate and regular rhythm.   Pulmonary/Chest: No respiratory distress.   Abdominal: Soft. There is no tenderness. There is no rebound and no guarding.   Gravid, fundus NT   Genitourinary: Vagina normal and uterus normal.   Musculoskeletal: Normal range of motion.   Neurological: She is alert and oriented to person, place, and time.   Skin: Skin is warm and dry. No rash noted. No erythema.   Psychiatric: She has a normal mood and affect. Her behavior is normal. Judgment and thought content normal.         ED Course   Procedures  Labs Reviewed   CBC W/ AUTO DIFFERENTIAL - Abnormal; Notable for the following components:       Result Value    Hemoglobin  9.0 (*)     Hematocrit 28.3 (*)     Mean Corpuscular Volume 69 (*)     Mean Corpuscular Hemoglobin 22.1 (*)     Mean Corpuscular Hemoglobin Conc 31.8 (*)     RDW 16.5 (*)     Gran # (ANC) 9.0 (*)     Immature Grans (Abs) 0.06 (*)     All other components within normal limits   COMPREHENSIVE METABOLIC PANEL - Abnormal; Notable for the following components:    Potassium 3.3 (*)     Glucose 66 (*)     Albumin 2.8 (*)     ALT 5 (*)     All other components within normal limits   POCT GLUCOSE        ECG Results          EKG 12-lead (Final result)  Result time 02/11/20 12:59:07    Final result by Interface, Lab In [facility] (02/11/20 12:59:07)                 Narrative:    Test Reason : R55,    Vent. Rate : 058 BPM     Atrial Rate : 058 BPM     P-R Int : 132 ms          QRS Dur : 082 ms      QT Int : 416 ms       P-R-T Axes : 046 064 049 degrees     QTc Int : 408 ms    Age and gender specific analysis  Sinus bradycardia  Otherwise normal ECG  When compared with ECG of 07-NOV-2019 15:45,  No significant change was found  Confirmed by Fariba CAMACHO, Tien ([id number]) on 2/11/2020 12:58:55 PM    Referred By: SATYA   SELF           Confirmed By:Tien Link MD                            Imaging Results    None          Medical Decision Making:   ED Management:  NST x 20 minutes reactive and reassuring. Actual tracing not able to be accessed on archive function on periwatch for exact numbers to be recorded.  [place]  CBC collected to rule out anemia >  CMP collected to rule out electrolyte abnormalities >   Urine dip collected to rule out dehydration >   POCT glucose: 76  EKG collected to evaluate for cardiac origins > patient needs outpatient echo per previous cardiology work up  Workup reassuring. Encourage follow up with outpatient cards.     Other:   I have discussed this case with another health care provider.       <> Summary of the Discussion: Dr. Walter              Attending Attestation:   Physician Attestation  Statement for Resident:  As the supervising MD   Physician Attestation Statement: I have personally seen and examined this patient.   I agree with the above history. -:   As the supervising MD I agree with the above PE.    As the supervising MD I agree with the above treatment, course, plan, and disposition.  I was personally present during the critical portions of the procedure(s) performed by the resident and was immediately available in the ED to provide services and assistance as needed during the entire procedure.  I have reviewed and agree with the residents interpretation of the following: rhythm strips, lab data and EKG.  I have reviewed the following: old records at this facility.                    ED Course as of Feb 11 1405   Fri Feb 07, 2020   2334 I evaluated Ms. Spaulding and discussed plan with Dr. Yang.  Pt presents at 36w5d with syncopal episode tonight.  She has been seen by cardiologist for 1-2 previous episodes.  She has not had recommended echo.    She is currently asymptomatic, VSS, FEtal status overall reassuring - decels initially due to on back.  Those now better.  Will  observe baby over next hour to ensure reassuring status.    Will obtain syncope workup  NAD  CTAB, RRR  Otherwise negative exam    [HU]   2352 EKG NSR at 70 with no st/t wave changes, cbc with microcytic anemia - h/h 9/28    [HU]   Sat Feb 08, 2020   0044 1h of fetal monitoring reassuring.  No further presyncopal symptoms.  Will need her potassium, then stable for d/c home.  Encouraged follow up with cardiology    [HU]      ED Course User Index  [HU] Camelia Walter DO                Clinical Impression:       ICD-10-CM ICD-9-CM   1. Syncope, unspecified syncope type R55 780.2   2. 36 weeks gestation of pregnancy Z3A.36 V22.2   3. Syncope R55 780.2         Disposition:   Disposition: Discharged  Condition: Stable        Lachelle Yang M.D.  OBGYN PGY1             Camelia Walter DO  02/11/20 1406

## 2020-02-08 NOTE — PROGRESS NOTES
Pt to [facility] c/o loss of consciousness. Pt did not fall to the floor, her s.o. caught her. Denies pain with ctx, denies LOF, VB. Blood sugar 71.

## 2020-02-10 ENCOUNTER — ROUTINE PRENATAL (OUTPATIENT)
Dept: OBSTETRICS AND GYNECOLOGY | Facility: CLINIC | Age: 25
End: 2020-02-10
Payer: MEDICAID

## 2020-02-10 ENCOUNTER — LAB VISIT (OUTPATIENT)
Dept: LAB | Facility: OTHER | Age: 25
End: 2020-02-10
Attending: ADVANCED PRACTICE MIDWIFE
Payer: MEDICAID

## 2020-02-10 VITALS
BODY MASS INDEX: 29.95 KG/M2 | DIASTOLIC BLOOD PRESSURE: 60 MMHG | WEIGHT: 202.81 LBS | SYSTOLIC BLOOD PRESSURE: 118 MMHG

## 2020-02-10 DIAGNOSIS — Z34.83 ENCOUNTER FOR SUPERVISION OF OTHER NORMAL PREGNANCY IN THIRD TRIMESTER: Primary | ICD-10-CM

## 2020-02-10 DIAGNOSIS — Z34.90 PREGNANCY, UNSPECIFIED GESTATIONAL AGE: ICD-10-CM

## 2020-02-10 LAB
BASOPHILS # BLD AUTO: 0.02 K/UL (ref 0–0.2)
BASOPHILS NFR BLD: 0.2 % (ref 0–1.9)
DIFFERENTIAL METHOD: ABNORMAL
EOSINOPHIL # BLD AUTO: 0 K/UL (ref 0–0.5)
EOSINOPHIL NFR BLD: 0.4 % (ref 0–8)
ERYTHROCYTE [DISTWIDTH] IN BLOOD BY AUTOMATED COUNT: 16.4 % (ref 11.5–14.5)
HCT VFR BLD AUTO: 33.6 % (ref 37–48.5)
HGB BLD-MCNC: 10 G/DL (ref 12–16)
IMM GRANULOCYTES # BLD AUTO: 0.06 K/UL (ref 0–0.04)
IMM GRANULOCYTES NFR BLD AUTO: 0.6 % (ref 0–0.5)
LYMPHOCYTES # BLD AUTO: 2 K/UL (ref 1–4.8)
LYMPHOCYTES NFR BLD: 19.3 % (ref 18–48)
MCH RBC QN AUTO: 20.9 PG (ref 27–31)
MCHC RBC AUTO-ENTMCNC: 29.8 G/DL (ref 32–36)
MCV RBC AUTO: 70 FL (ref 82–98)
MONOCYTES # BLD AUTO: 0.5 K/UL (ref 0.3–1)
MONOCYTES NFR BLD: 4.7 % (ref 4–15)
NEUTROPHILS # BLD AUTO: 7.8 K/UL (ref 1.8–7.7)
NEUTROPHILS NFR BLD: 74.8 % (ref 38–73)
NRBC BLD-RTO: 0 /100 WBC
PLATELET # BLD AUTO: 371 K/UL (ref 150–350)
PMV BLD AUTO: 9.8 FL (ref 9.2–12.9)
RBC # BLD AUTO: 4.78 M/UL (ref 4–5.4)
WBC # BLD AUTO: 10.37 K/UL (ref 3.9–12.7)

## 2020-02-10 PROCEDURE — 99999 PR PBB SHADOW E&M-EST. PATIENT-LVL II: CPT | Mod: PBBFAC,,, | Performed by: ADVANCED PRACTICE MIDWIFE

## 2020-02-10 PROCEDURE — 99213 OFFICE O/P EST LOW 20 MIN: CPT | Mod: S$PBB,TH,, | Performed by: ADVANCED PRACTICE MIDWIFE

## 2020-02-10 PROCEDURE — 99999 PR PBB SHADOW E&M-EST. PATIENT-LVL II: ICD-10-PCS | Mod: PBBFAC,,, | Performed by: ADVANCED PRACTICE MIDWIFE

## 2020-02-10 PROCEDURE — 99213 PR OFFICE/OUTPT VISIT, EST, LEVL III, 20-29 MIN: ICD-10-PCS | Mod: S$PBB,TH,, | Performed by: ADVANCED PRACTICE MIDWIFE

## 2020-02-10 PROCEDURE — 99212 OFFICE O/P EST SF 10 MIN: CPT | Mod: PBBFAC,TH | Performed by: ADVANCED PRACTICE MIDWIFE

## 2020-02-10 PROCEDURE — 85025 COMPLETE CBC W/AUTO DIFF WBC: CPT

## 2020-02-10 PROCEDURE — 86703 HIV-1/HIV-2 1 RESULT ANTBDY: CPT

## 2020-02-10 PROCEDURE — 36415 COLL VENOUS BLD VENIPUNCTURE: CPT

## 2020-02-10 PROCEDURE — 86592 SYPHILIS TEST NON-TREP QUAL: CPT

## 2020-02-10 RX ORDER — VALACYCLOVIR HYDROCHLORIDE 500 MG/1
500 TABLET, FILM COATED ORAL 2 TIMES DAILY
Qty: 30 TABLET | Refills: 0 | Status: ON HOLD | OUTPATIENT
Start: 2020-02-10 | End: 2020-03-05 | Stop reason: HOSPADM

## 2020-02-10 NOTE — PROGRESS NOTES
Chief Complaint   Patient presents with    Routine Prenatal Visit       24 y.o. female  at 37w1d, by Estimated Date of Delivery: 3/1/20    Complaints today: none . Doing well today. Alone today  Reviewed TW lbs    ROS  OBSTETRICS:   Contractions: Nothing regular   Bleeding No   Loss of fluid No   Fetal mvmnt Yes   GASTRO:   Nausea No   Vomiting No      OB History    Para Term  AB Living   3 2 2     2   SAB TAB Ectopic Multiple Live Births         0 1      # Outcome Date GA Lbr Dayron/2nd Weight Sex Delivery Anes PTL Lv   3 Current            2 Term 17 39w5d / 00:05 3.827 kg (8 lb 7 oz) F Vag-Spont None N HENRIK   1 Term 16 41w0d  3.033 kg (6 lb 11 oz) F Vag-Spont EPI N        Dating reviewed  Allergies and problem list reviewed and updated  Medical and surgical history reviewed  Prenatal labs reviewed and updated    PHYSICAL EXAM  /60   Wt 92 kg (202 lb 13.2 oz)   LMP 2019   BMI 29.95 kg/m²     GENERAL: No acute distress  HEENT: Normocephalic, atraumatic  NEURO: Alert and oriented x3  PSYCH: Normal mood and affect  PULMONARY: Non-labored respiration; no tachypnea  ABD: Soft, gravid, nontender.      ASSESSMENT AND PLAN    Pregnancy #3 Problems (from 19 to present)     Problem Noted Resolved    Iron deficiency anemia 2020 by Corin Mcgregor CNM No    Pregnancy with one fetus, antepartum 10/2/2019 by Uyen Ortiz CNM No    Overview Addendum 10/29/2019  1:36 AM by Corin Mcgregor CNM     Prepregnancy BMI: 28 - ABC max 38#  Pap: 2019 - NILM  Dating - per LMP consistent with 19 wk sonogram  Prenatal labs X  U/S - WNL   Aneuploidy screening - negative Quad screen  (AB POS)  GDM screen -   Vaccines - [ ]Flu [ ]TDAP declines   GBS NEG   [x ]Consents  Contraception -  Peds -   Circ - N/A            Beta thalassemia minor 2019 by Corin Mcgregor CNM No    HSV-2 seropositive 2017 by Joyce Lees CNM No    Overview Addendum 2019 12:19 PM by  "Corin Mcgregor CNM     No hx genital or oral outbreaks.  Was tested with last pregnancy and told she was positive.  Per up to date "  If the woman is seropositive for HSV-2 or HSV-1 but has no history of genital herpes lesions, we do not offer suppressive antiviral therapy [2]. Use of suppressive antiviral therapy in this setting has not been studied"  (attempted getting labs/ records from previous provider to review labs/ cultures?)         10 weeks gestation of pregnancy 8/6/2019 by Corin Mcgregor CNM 10/2/2019 by Uyen Ortiz CNM          Delivery consents  signed  Reviewed GBS-NEG  And no  need for intrapartum abx  Reviewed repeat HIV/RPR getting today   Education regarding labor precautions.    Reviewed warning signs, normal FM, and how/when to call.      Follow-up: 1 week, call or present sooner PRN  "

## 2020-02-11 LAB
HIV 1+2 AB+HIV1 P24 AG SERPL QL IA: NEGATIVE
RPR SER QL: NORMAL

## 2020-02-20 ENCOUNTER — HOSPITAL ENCOUNTER (OUTPATIENT)
Dept: CARDIOLOGY | Facility: OTHER | Age: 25
Discharge: HOME | End: 2020-02-20
Attending: ADVANCED PRACTICE MIDWIFE
Payer: MEDICAID

## 2020-02-20 ENCOUNTER — ROUTINE PRENATAL (OUTPATIENT)
Dept: OBSTETRICS AND GYNECOLOGY | Facility: CLINIC | Age: 25
End: 2020-02-20
Payer: MEDICAID

## 2020-02-20 VITALS
DIASTOLIC BLOOD PRESSURE: 62 MMHG | HEIGHT: 69 IN | WEIGHT: 202 LBS | BODY MASS INDEX: 29.92 KG/M2 | SYSTOLIC BLOOD PRESSURE: 126 MMHG | HEART RATE: 74 BPM

## 2020-02-20 VITALS
DIASTOLIC BLOOD PRESSURE: 62 MMHG | BODY MASS INDEX: 29.84 KG/M2 | SYSTOLIC BLOOD PRESSURE: 126 MMHG | WEIGHT: 202.06 LBS

## 2020-02-20 DIAGNOSIS — D56.3 BETA THALASSEMIA MINOR: ICD-10-CM

## 2020-02-20 DIAGNOSIS — A60.9 HSV (HERPES SIMPLEX VIRUS) ANOGENITAL INFECTION: ICD-10-CM

## 2020-02-20 DIAGNOSIS — D50.9 IRON DEFICIENCY ANEMIA, UNSPECIFIED IRON DEFICIENCY ANEMIA TYPE: ICD-10-CM

## 2020-02-20 DIAGNOSIS — Z34.90 PREGNANCY WITH ONE FETUS, ANTEPARTUM: ICD-10-CM

## 2020-02-20 DIAGNOSIS — Z13.9 RISK AND FUNCTIONAL ASSESSMENT: ICD-10-CM

## 2020-02-20 DIAGNOSIS — Z34.90 PREGNANCY, UNSPECIFIED GESTATIONAL AGE: ICD-10-CM

## 2020-02-20 DIAGNOSIS — R76.8 HSV-2 SEROPOSITIVE: ICD-10-CM

## 2020-02-20 DIAGNOSIS — Z34.90 PREGNANCY, UNSPECIFIED GESTATIONAL AGE: Primary | ICD-10-CM

## 2020-02-20 PROCEDURE — 93306 TTE W/DOPPLER COMPLETE: CPT | Mod: 26,,, | Performed by: INTERNAL MEDICINE

## 2020-02-20 PROCEDURE — 99212 PR OFFICE/OUTPT VISIT, EST, LEVL II, 10-19 MIN: ICD-10-PCS | Mod: TH,S$PBB,, | Performed by: ADVANCED PRACTICE MIDWIFE

## 2020-02-20 PROCEDURE — 99212 OFFICE O/P EST SF 10 MIN: CPT | Mod: TH,S$PBB,, | Performed by: ADVANCED PRACTICE MIDWIFE

## 2020-02-20 PROCEDURE — 93306 TTE W/DOPPLER COMPLETE: CPT

## 2020-02-20 PROCEDURE — 93306 ECHO (CUPID ONLY): ICD-10-PCS | Mod: 26,,, | Performed by: INTERNAL MEDICINE

## 2020-02-20 PROCEDURE — 99999 PR PBB SHADOW E&M-EST. PATIENT-LVL II: ICD-10-PCS | Mod: PBBFAC,,, | Performed by: ADVANCED PRACTICE MIDWIFE

## 2020-02-20 PROCEDURE — 99999 PR PBB SHADOW E&M-EST. PATIENT-LVL II: CPT | Mod: PBBFAC,,, | Performed by: ADVANCED PRACTICE MIDWIFE

## 2020-02-20 PROCEDURE — 99212 OFFICE O/P EST SF 10 MIN: CPT | Mod: PBBFAC,25,TH | Performed by: ADVANCED PRACTICE MIDWIFE

## 2020-02-20 NOTE — PROGRESS NOTES
24 y.o. female  at 38w4d, by Estimated Date of Delivery: 3/1/20    Doing well today.    Reviewed TW lbs    BMI  -- Discussed IOM recommended weight gain of:   Overweight 25-29.9  15-25   -- Discussed criteria for delivery at [facility] r/t excessive pre-preg weight or excessive weight gain:   Pre-pregnancy BMI over 40 or excess pregnancy weight gain defined as:   Pre-preg BMI 18.5-24.9;  Excess weight gain = > 53 pounds    ROS  OBSTETRICS:   Contractions: Nothing regular   Bleeding No   Loss of fluid No   Fetal mvmnt Yes  GASTRO:   Nausea No   Vomiting No      OB History    Para Term  AB Living   3 2 2     2   SAB TAB Ectopic Multiple Live Births         0 1      # Outcome Date GA Lbr Dayron/2nd Weight Sex Delivery Anes PTL Lv   3 Current            2 Term 17 39w5d / 00:05 3.827 kg (8 lb 7 oz) F Vag-Spont None N HENRIK   1 Term 16 41w0d  3.033 kg (6 lb 11 oz) F Vag-Spont EPI N        Dating reviewed  Allergies and problem list reviewed and updated  Medical and surgical history reviewed  Prenatal labs reviewed and updated    PHYSICAL EXAM  /62   Wt 91.7 kg (202 lb 0.8 oz)   LMP 2019   BMI 29.84 kg/m²     GENERAL: No acute distress  HEENT: Normocephalic, atraumatic  NEURO: Alert and oriented x3  PSYCH: Normal mood and affect  PULMONARY: Non-labored respiration; no tachypnea  ABD: Soft, gravid, nontender.      ASSESSMENT AND PLAN    Pregnancy #3 Problems (from 19 to present)     Problem Noted Resolved    Iron deficiency anemia 2020 by Corin Mcgregor CNM No    Pregnancy with one fetus, antepartum 10/2/2019 by Uyen Ortiz CNM No    Overview Addendum 10/29/2019  1:36 AM by Corin Mcgregor CNM     Prepregnancy BMI: 28 - ABC max 38#  Pap: 2019 - NILM  Dating - per LMP consistent with 19 wk sonogram  Prenatal labs X  U/S - WNL   Aneuploidy screening - negative Quad screen  (AB POS)  GDM screen -   Vaccines - [ ]Flu [ ]TDAP  GBS  [ ]Consents  Contraception -  Peds  "-   Circ -            Beta thalassemia minor 8/8/2019 by Corin Mcgregor CNM No    HSV-2 seropositive 9/14/2017 by Joyce Lees CNM No    Overview Addendum 8/6/2019 12:19 PM by Corin Mcgregor CNM     No hx genital or oral outbreaks.  Was tested with last pregnancy and told she was positive.  Per up to date "  If the woman is seropositive for HSV-2 or HSV-1 but has no history of genital herpes lesions, we do not offer suppressive antiviral therapy [2]. Use of suppressive antiviral therapy in this setting has not been studied"  (attempted getting labs/ records from previous provider to review labs/ cultures?)         10 weeks gestation of pregnancy 8/6/2019 by Corin Mcgregor CNM 10/2/2019 by Uyen Ortiz CNM          Delivery consents signed    Discussed that she needs to have her cardiac echo done urgently. She understands that if she does not have this done she will be risked out of the ABC. She states that she will have this done within a week.    Vertex according to [place].    Discussed recommendation for induction at 41wks. Patient declines and would prefer to await spontaneous labor. Discussed that in that case it is recommended to have biweekly prenatal testing during 41st week of pregnancy with IOL if non-reassuring, and IOL by 41w6d regardless. Patient agrees to this and will have these scheduled at her 40 wk visit.    Patient states that she is taking valtrex BID.    Had a GBS UTI during this pregnancy that was treated, then had a negative GBS culture on 2/5/20.    Reviewed warning signs, normal FM, and how/when to call.      Follow-up: 1 week, call or present sooner PRN    "

## 2020-02-21 LAB
ASCENDING AORTA: 2.42 CM
AV INDEX (PROSTH): 0.92
AV MEAN GRADIENT: 6 MMHG
AV PEAK GRADIENT: 13 MMHG
AV VALVE AREA: 3.38 CM2
AV VELOCITY RATIO: 0.77
BSA FOR ECHO PROCEDURE: 2.11 M2
CV ECHO LV RWT: 0.28 CM
DOP CALC AO PEAK VEL: 1.77 M/S
DOP CALC AO VTI: 35.15 CM
DOP CALC LVOT AREA: 3.7 CM2
DOP CALC LVOT DIAMETER: 2.17 CM
DOP CALC LVOT PEAK VEL: 1.37 M/S
DOP CALC LVOT STROKE VOLUME: 118.92 CM3
DOP CALCLVOT PEAK VEL VTI: 32.17 CM
E WAVE DECELERATION TIME: 241.71 MSEC
E/A RATIO: 2.02
ECHO LV POSTERIOR WALL: 0.76 CM (ref 0.6–1.1)
FRACTIONAL SHORTENING: 35 % (ref 28–44)
INTERVENTRICULAR SEPTUM: 0.77 CM (ref 0.6–1.1)
LA MAJOR: 5.94 CM
LA MINOR: 5.69 CM
LA WIDTH: 3.7 CM
LEFT ATRIUM SIZE: 3.83 CM
LEFT ATRIUM VOLUME INDEX MOD: 23.65 ML/M2
LEFT ATRIUM VOLUME INDEX: 33.8 ML/M2
LEFT ATRIUM VOLUME: 70.01 CM3
LEFT INTERNAL DIMENSION IN SYSTOLE: 3.55 CM (ref 2.1–4)
LEFT VENTRICLE DIASTOLIC VOLUME INDEX: 69.83 ML/M2
LEFT VENTRICLE DIASTOLIC VOLUME: 144.86 ML
LEFT VENTRICLE MASS INDEX: 72 G/M2
LEFT VENTRICLE SYSTOLIC VOLUME INDEX: 25.3 ML/M2
LEFT VENTRICLE SYSTOLIC VOLUME: 52.55 ML
LEFT VENTRICULAR INTERNAL DIMENSION IN DIASTOLE: 5.46 CM (ref 3.5–6)
LEFT VENTRICULAR MASS: 149.33 G
MV PEAK A VEL: 0.51 M/S
MV PEAK E VEL: 1.03 M/S
MV STENOSIS PRESSURE HALF TIME: 70 MS
MV VALVE AREA P 1/2 METHOD: 3.14 CM2
PV PEAK VELOCITY: 1.21 CM/S
RA MAJOR: 5.65 CM
RA WIDTH: 3.18 CM
SINUS: 2.26 CM
STJ: 2.28 CM
TRICUSPID ANNULAR PLANE SYSTOLIC EXCURSION: 2.95 CM

## 2020-02-25 PROBLEM — R55 SYNCOPAL EPISODES: Status: ACTIVE | Noted: 2020-02-25

## 2020-02-26 PROBLEM — Z13.9 RISK AND FUNCTIONAL ASSESSMENT: Status: ACTIVE | Noted: 2020-02-26

## 2020-03-03 ENCOUNTER — ROUTINE PRENATAL (OUTPATIENT)
Dept: OBSTETRICS AND GYNECOLOGY | Facility: CLINIC | Age: 25
End: 2020-03-03
Payer: MEDICAID

## 2020-03-03 ENCOUNTER — HOSPITAL ENCOUNTER (INPATIENT)
Facility: OTHER | Age: 25
LOS: 2 days | Discharge: HOME | End: 2020-03-05
Attending: OBSTETRICS & GYNECOLOGY | Admitting: OBSTETRICS & GYNECOLOGY
Payer: MEDICAID

## 2020-03-03 ENCOUNTER — ANESTHESIA (OUTPATIENT)
Dept: OBSTETRICS AND GYNECOLOGY | Facility: OTHER | Age: 25
End: 2020-03-03
Payer: MEDICAID

## 2020-03-03 ENCOUNTER — ANESTHESIA EVENT (OUTPATIENT)
Dept: OBSTETRICS AND GYNECOLOGY | Facility: OTHER | Age: 25
End: 2020-03-03
Payer: MEDICAID

## 2020-03-03 ENCOUNTER — HOSPITAL ENCOUNTER (OUTPATIENT)
Dept: PERINATAL CARE | Facility: OTHER | Age: 25
Discharge: HOME | End: 2020-03-03
Attending: ADVANCED PRACTICE MIDWIFE
Payer: MEDICAID

## 2020-03-03 VITALS
BODY MASS INDEX: 30.67 KG/M2 | WEIGHT: 207.69 LBS | DIASTOLIC BLOOD PRESSURE: 66 MMHG | SYSTOLIC BLOOD PRESSURE: 118 MMHG

## 2020-03-03 DIAGNOSIS — Z34.83 ENCOUNTER FOR SUPERVISION OF OTHER NORMAL PREGNANCY IN THIRD TRIMESTER: Primary | ICD-10-CM

## 2020-03-03 DIAGNOSIS — O36.8130 DECREASED FETAL MOVEMENTS IN THIRD TRIMESTER, SINGLE OR UNSPECIFIED FETUS: ICD-10-CM

## 2020-03-03 DIAGNOSIS — O41.03X0 OLIGOHYDRAMNIOS IN THIRD TRIMESTER, SINGLE OR UNSPECIFIED FETUS: ICD-10-CM

## 2020-03-03 DIAGNOSIS — Z34.83 ENCOUNTER FOR SUPERVISION OF OTHER NORMAL PREGNANCY IN THIRD TRIMESTER: ICD-10-CM

## 2020-03-03 DIAGNOSIS — O41.03X1 OLIGOHYDRAMNIOS IN THIRD TRIMESTER, FETUS 1 OF MULTIPLE GESTATION: ICD-10-CM

## 2020-03-03 DIAGNOSIS — O41.03X0 OLIGOHYDRAMNIOS IN THIRD TRIMESTER: ICD-10-CM

## 2020-03-03 PROBLEM — Z34.90 SUPERVISION OF NORMAL PREGNANCY: Status: ACTIVE | Noted: 2020-03-03

## 2020-03-03 PROCEDURE — 85025 COMPLETE CBC W/AUTO DIFF WBC: CPT

## 2020-03-03 PROCEDURE — 99213 PR OFFICE/OUTPT VISIT, EST, LEVL III, 20-29 MIN: ICD-10-PCS | Mod: S$PBB,TH,, | Performed by: ADVANCED PRACTICE MIDWIFE

## 2020-03-03 PROCEDURE — 86850 RBC ANTIBODY SCREEN: CPT

## 2020-03-03 PROCEDURE — 99999 PR PBB SHADOW E&M-EST. PATIENT-LVL II: ICD-10-PCS | Mod: PBBFAC,,, | Performed by: ADVANCED PRACTICE MIDWIFE

## 2020-03-03 PROCEDURE — 99213 OFFICE O/P EST LOW 20 MIN: CPT | Mod: S$PBB,TH,, | Performed by: ADVANCED PRACTICE MIDWIFE

## 2020-03-03 PROCEDURE — 59025 PR FETAL 2N-STRESS TEST: ICD-10-PCS | Mod: 26,,, | Performed by: PEDIATRICS

## 2020-03-03 PROCEDURE — 59025 FETAL NON-STRESS TEST: CPT | Mod: 26,,, | Performed by: PEDIATRICS

## 2020-03-03 PROCEDURE — 76815 PR  US,PREGNANT UTERUS,LIMITED, 1/> FETUSES: ICD-10-PCS | Mod: 26,,, | Performed by: PEDIATRICS

## 2020-03-03 PROCEDURE — 76815 OB US LIMITED FETUS(S): CPT | Mod: 26,,, | Performed by: PEDIATRICS

## 2020-03-03 PROCEDURE — 99999 PR PBB SHADOW E&M-EST. PATIENT-LVL II: CPT | Mod: PBBFAC,,, | Performed by: ADVANCED PRACTICE MIDWIFE

## 2020-03-03 PROCEDURE — 11000001 HC ACUTE MED/SURG PRIVATE ROOM

## 2020-03-03 PROCEDURE — 76815 OB US LIMITED FETUS(S): CPT

## 2020-03-03 PROCEDURE — 59025 FETAL NON-STRESS TEST: CPT

## 2020-03-03 PROCEDURE — 72100002 HC LABOR CARE, 1ST 8 HOURS

## 2020-03-03 PROCEDURE — 99212 OFFICE O/P EST SF 10 MIN: CPT | Mod: PBBFAC,TH,25 | Performed by: ADVANCED PRACTICE MIDWIFE

## 2020-03-03 RX ORDER — SODIUM CHLORIDE 9 MG/ML
INJECTION, SOLUTION INTRAVENOUS
Status: DISCONTINUED | OUTPATIENT
Start: 2020-03-04 | End: 2020-03-04

## 2020-03-03 RX ORDER — OXYTOCIN/RINGER'S LACTATE 30/500 ML
334 PLASTIC BAG, INJECTION (ML) INTRAVENOUS ONCE
Status: COMPLETED | OUTPATIENT
Start: 2020-03-04 | End: 2020-03-04

## 2020-03-03 RX ORDER — OXYTOCIN/RINGER'S LACTATE 30/500 ML
2 PLASTIC BAG, INJECTION (ML) INTRAVENOUS CONTINUOUS
Status: DISCONTINUED | OUTPATIENT
Start: 2020-03-04 | End: 2020-03-04

## 2020-03-03 RX ORDER — CALCIUM CARBONATE 200(500)MG
500 TABLET,CHEWABLE ORAL 3 TIMES DAILY PRN
Status: DISCONTINUED | OUTPATIENT
Start: 2020-03-04 | End: 2020-03-04

## 2020-03-03 RX ORDER — SODIUM CHLORIDE, SODIUM LACTATE, POTASSIUM CHLORIDE, CALCIUM CHLORIDE 600; 310; 30; 20 MG/100ML; MG/100ML; MG/100ML; MG/100ML
INJECTION, SOLUTION INTRAVENOUS CONTINUOUS
Status: DISCONTINUED | OUTPATIENT
Start: 2020-03-04 | End: 2020-03-04

## 2020-03-03 RX ORDER — OXYTOCIN/RINGER'S LACTATE 30/500 ML
95 PLASTIC BAG, INJECTION (ML) INTRAVENOUS ONCE
Status: DISCONTINUED | OUTPATIENT
Start: 2020-03-04 | End: 2020-03-04

## 2020-03-03 RX ORDER — SIMETHICONE 80 MG
1 TABLET,CHEWABLE ORAL 4 TIMES DAILY PRN
Status: DISCONTINUED | OUTPATIENT
Start: 2020-03-04 | End: 2020-03-04

## 2020-03-03 RX ORDER — ONDANSETRON 8 MG/1
8 TABLET, ORALLY DISINTEGRATING ORAL EVERY 8 HOURS PRN
Status: DISCONTINUED | OUTPATIENT
Start: 2020-03-04 | End: 2020-03-04

## 2020-03-03 NOTE — PROGRESS NOTES
Call from Dr. Russo regarding PNT this pm  Oligohydramnios  (MVP) wnl VAN  Recommends induction tonight  Induction scheduled for tonight @ 9pm

## 2020-03-03 NOTE — PROGRESS NOTES
Chief Complaint   Patient presents with    Decreased Fetal Movement       24 y.o. female  at 40w2d, by Estimated Date of Delivery: 3/1/20    Complaints today: decreased fetal movement. Doing well today.denies ctx, bleeding or LOF Reviewed TW lbs    ROS  OBSTETRICS:   Contractions: Nothing regular   Bleeding No   Loss of fluid No   Fetal mvmnt decreased  GASTRO:   Nausea No   Vomiting No      OB History    Para Term  AB Living   3 2 2     2   SAB TAB Ectopic Multiple Live Births         0 1      # Outcome Date GA Lbr Dayron/2nd Weight Sex Delivery Anes PTL Lv   3 Current            2 Term 17 39w5d / 00:05 3.827 kg (8 lb 7 oz) F Vag-Spont None N HENRIK   1 Term 16 41w0d  3.033 kg (6 lb 11 oz) F Vag-Spont EPI N        Dating reviewed  Allergies and problem list reviewed and updated  Medical and surgical history reviewed  Prenatal labs reviewed and updated  NST ordered    PHYSICAL EXAM  /66   Wt 94.2 kg (207 lb 10.8 oz)   LMP 2019   BMI 30.67 kg/m²     GENERAL: No acute distress  HEENT: Normocephalic, atraumatic  NEURO: Alert and oriented x3  PSYCH: Normal mood and affect  PULMONARY: Non-labored respiration; no tachypnea  ABD: Soft, gravid, nontender.      ASSESSMENT AND PLAN    Pregnancy #3 Problems (from 19 to present)     Problem Noted Resolved    Birth Center Risk Assessment: 0- Meets birth center guidelines 2020 by Donna Rodriguez CNM No    Overview Addendum 2020  8:36 PM by Donna Rodriguez CNM     Birth Center Risk Assessment: 0- Meets birth center guidelines    0- CNM management in ABC  1- CNM management on L&D  2- Consultation with OB to develop  plan of care  3- Collaborative CNM/OB management with delivery on L&D  4- Permanent referral of care to MD             Iron deficiency anemia 2020 by Corin Mcgregor CNM No    Overview Signed 2020  2:27 PM by Donna Rodriguez CNM     (x) Taking PO iron         Pregnancy with one  fetus, antepartum 10/2/2019 by Uyen Ortiz CNM No    Overview Addendum 2/26/2020  2:35 PM by Donna Rodriguez CNM     Prepregnancy BMI: 28 - ABC max 38#  Pap: 7/2019 - NILM  Dating - per LMP consistent with 19 wk sonogram  Prenatal labs X  U/S - WNL   Aneuploidy screening - negative Quad screen  (AB POS)  GDM screen - Passed 1 hr  Vaccines - [ ]Flu [ ]TDAP  GBS positive urine and was treated, then negative swab  [x]Consents  Contraception -  Peds -   Circ -            Beta thalassemia minor 8/8/2019 by Corin Mcgregor CNM No    Overview Addendum 2/26/2020  2:43 PM by Donna Rodriguez CNM     2/10/20 H&H 10 & 33  2/20/20 Patient states that she has no history of hemorrhagic or thrombotic events         HSV-2 seropositive 9/14/2017 by Joyce Lees CNM No    10 weeks gestation of pregnancy 8/6/2019 by Corin Mcgregor CNM 10/2/2019 by Uyen Ortiz CNM          Delivery consents  signed  Discussed postdates management and IOL - she prefers to await spontaneous labor if possible  Wants to ;have her membranes stripped @ 41 week   Discussed prenatal testing and that IOL would be recommended immediately if prenatal testing is not reassuring; she states understanding and agrees to this   NST ordered today    Reviewed warning signs, normal FM, and how/when to call.      Follow-up: 1 week, call or present sooner PRNHere today for C/O decreased fetal movement since yesterday evening  Denies ctx, cramps, bleedng or other problems  Also discussed induction @ 42 weeks but she wants to come in on Monday @ 41 for cervical stripping.  I ordered NST today to monitor FHR and movement.

## 2020-03-04 PROBLEM — O41.03X0 OLIGOHYDRAMNIOS IN THIRD TRIMESTER: Status: RESOLVED | Noted: 2020-03-04 | Resolved: 2020-03-04

## 2020-03-04 PROBLEM — Z13.9 RISK AND FUNCTIONAL ASSESSMENT: Status: RESOLVED | Noted: 2020-02-26 | Resolved: 2020-03-04

## 2020-03-04 PROBLEM — Z34.90 PREGNANCY WITH ONE FETUS, ANTEPARTUM: Status: RESOLVED | Noted: 2019-10-02 | Resolved: 2020-03-04

## 2020-03-04 PROBLEM — Z34.90 SUPERVISION OF NORMAL PREGNANCY: Status: RESOLVED | Noted: 2020-03-03 | Resolved: 2020-03-04

## 2020-03-04 PROBLEM — O23.43 GROUP B STREPTOCOCCUS URINARY TRACT INFECTION AFFECTING PREGNANCY IN THIRD TRIMESTER: Status: RESOLVED | Noted: 2017-09-25 | Resolved: 2020-03-04

## 2020-03-04 PROBLEM — B95.1 GROUP B STREPTOCOCCUS URINARY TRACT INFECTION AFFECTING PREGNANCY IN THIRD TRIMESTER: Status: RESOLVED | Noted: 2017-09-25 | Resolved: 2020-03-04

## 2020-03-04 PROBLEM — O41.03X0 OLIGOHYDRAMNIOS IN THIRD TRIMESTER: Status: ACTIVE | Noted: 2020-03-04

## 2020-03-04 LAB
ABO + RH BLD: NORMAL
BASOPHILS # BLD AUTO: 0.02 K/UL (ref 0–0.2)
BASOPHILS # BLD AUTO: 0.02 K/UL (ref 0–0.2)
BASOPHILS NFR BLD: 0.1 % (ref 0–1.9)
BASOPHILS NFR BLD: 0.2 % (ref 0–1.9)
BLD GP AB SCN CELLS X3 SERPL QL: NORMAL
DIFFERENTIAL METHOD: ABNORMAL
DIFFERENTIAL METHOD: ABNORMAL
EOSINOPHIL # BLD AUTO: 0.1 K/UL (ref 0–0.5)
EOSINOPHIL # BLD AUTO: 0.1 K/UL (ref 0–0.5)
EOSINOPHIL NFR BLD: 0.5 % (ref 0–8)
EOSINOPHIL NFR BLD: 0.6 % (ref 0–8)
ERYTHROCYTE [DISTWIDTH] IN BLOOD BY AUTOMATED COUNT: 16.9 % (ref 11.5–14.5)
ERYTHROCYTE [DISTWIDTH] IN BLOOD BY AUTOMATED COUNT: 17.1 % (ref 11.5–14.5)
HCT VFR BLD AUTO: 31 % (ref 37–48.5)
HCT VFR BLD AUTO: 31.6 % (ref 37–48.5)
HGB BLD-MCNC: 9.6 G/DL (ref 12–16)
HGB BLD-MCNC: 9.9 G/DL (ref 12–16)
IMM GRANULOCYTES # BLD AUTO: 0.05 K/UL (ref 0–0.04)
IMM GRANULOCYTES # BLD AUTO: 0.07 K/UL (ref 0–0.04)
IMM GRANULOCYTES NFR BLD AUTO: 0.3 % (ref 0–0.5)
IMM GRANULOCYTES NFR BLD AUTO: 0.5 % (ref 0–0.5)
LYMPHOCYTES # BLD AUTO: 3.1 K/UL (ref 1–4.8)
LYMPHOCYTES # BLD AUTO: 3.1 K/UL (ref 1–4.8)
LYMPHOCYTES NFR BLD: 21.7 % (ref 18–48)
LYMPHOCYTES NFR BLD: 23.6 % (ref 18–48)
MCH RBC QN AUTO: 21.1 PG (ref 27–31)
MCH RBC QN AUTO: 21.5 PG (ref 27–31)
MCHC RBC AUTO-ENTMCNC: 31 G/DL (ref 32–36)
MCHC RBC AUTO-ENTMCNC: 31.3 G/DL (ref 32–36)
MCV RBC AUTO: 68 FL (ref 82–98)
MCV RBC AUTO: 69 FL (ref 82–98)
MONOCYTES # BLD AUTO: 0.6 K/UL (ref 0.3–1)
MONOCYTES # BLD AUTO: 0.7 K/UL (ref 0.3–1)
MONOCYTES NFR BLD: 4.5 % (ref 4–15)
MONOCYTES NFR BLD: 4.9 % (ref 4–15)
NEUTROPHILS # BLD AUTO: 10.4 K/UL (ref 1.8–7.7)
NEUTROPHILS # BLD AUTO: 9.3 K/UL (ref 1.8–7.7)
NEUTROPHILS NFR BLD: 70.3 % (ref 38–73)
NEUTROPHILS NFR BLD: 72.8 % (ref 38–73)
NRBC BLD-RTO: 0 /100 WBC
NRBC BLD-RTO: 0 /100 WBC
PLATELET # BLD AUTO: 345 K/UL (ref 150–350)
PLATELET # BLD AUTO: 366 K/UL (ref 150–350)
PMV BLD AUTO: 9.7 FL (ref 9.2–12.9)
PMV BLD AUTO: 9.7 FL (ref 9.2–12.9)
RBC # BLD AUTO: 4.55 M/UL (ref 4–5.4)
RBC # BLD AUTO: 4.61 M/UL (ref 4–5.4)
WBC # BLD AUTO: 13.16 K/UL (ref 3.9–12.7)
WBC # BLD AUTO: 14.31 K/UL (ref 3.9–12.7)

## 2020-03-04 PROCEDURE — 62326 NJX INTERLAMINAR LMBR/SAC: CPT | Performed by: STUDENT IN AN ORGANIZED HEALTH CARE EDUCATION/TRAINING PROGRAM

## 2020-03-04 PROCEDURE — 63600175 PHARM REV CODE 636 W HCPCS: Performed by: ADVANCED PRACTICE MIDWIFE

## 2020-03-04 PROCEDURE — 11000001 HC ACUTE MED/SURG PRIVATE ROOM

## 2020-03-04 PROCEDURE — 27200710 HC EPIDURAL INFUSION PUMP SET: Performed by: ANESTHESIOLOGY

## 2020-03-04 PROCEDURE — 63600175 PHARM REV CODE 636 W HCPCS: Performed by: STUDENT IN AN ORGANIZED HEALTH CARE EDUCATION/TRAINING PROGRAM

## 2020-03-04 PROCEDURE — 25000003 PHARM REV CODE 250: Performed by: STUDENT IN AN ORGANIZED HEALTH CARE EDUCATION/TRAINING PROGRAM

## 2020-03-04 PROCEDURE — 59409 OBSTETRICAL CARE: CPT | Mod: AA,,, | Performed by: ANESTHESIOLOGY

## 2020-03-04 PROCEDURE — 25000003 PHARM REV CODE 250: Performed by: ADVANCED PRACTICE MIDWIFE

## 2020-03-04 PROCEDURE — 51701 INSERT BLADDER CATHETER: CPT

## 2020-03-04 PROCEDURE — 72200005 HC VAGINAL DELIVERY LEVEL II

## 2020-03-04 PROCEDURE — 85025 COMPLETE CBC W/AUTO DIFF WBC: CPT

## 2020-03-04 PROCEDURE — 59409 PRA ETRICAL CARE,VAG DELIV ONLY: ICD-10-PCS | Mod: AA,,, | Performed by: ANESTHESIOLOGY

## 2020-03-04 PROCEDURE — 36415 COLL VENOUS BLD VENIPUNCTURE: CPT

## 2020-03-04 PROCEDURE — C1751 CATH, INF, PER/CENT/MIDLINE: HCPCS | Performed by: ANESTHESIOLOGY

## 2020-03-04 RX ORDER — METHYLERGONOVINE MALEATE 0.2 MG/ML
200 INJECTION INTRAVENOUS
Status: DISCONTINUED | OUTPATIENT
Start: 2020-03-04 | End: 2020-03-04

## 2020-03-04 RX ORDER — IBUPROFEN 600 MG/1
600 TABLET ORAL EVERY 6 HOURS PRN
Status: DISCONTINUED | OUTPATIENT
Start: 2020-03-04 | End: 2020-03-05 | Stop reason: SDUPTHER

## 2020-03-04 RX ORDER — PRENATAL WITH FERROUS FUM AND FOLIC ACID 3080; 920; 120; 400; 22; 1.84; 3; 20; 10; 1; 12; 200; 27; 25; 2 [IU]/1; [IU]/1; MG/1; [IU]/1; MG/1; MG/1; MG/1; MG/1; MG/1; MG/1; UG/1; MG/1; MG/1; MG/1; MG/1
1 TABLET ORAL DAILY
Status: DISCONTINUED | OUTPATIENT
Start: 2020-03-04 | End: 2020-03-05 | Stop reason: HOSPADM

## 2020-03-04 RX ORDER — BUPIVACAINE HYDROCHLORIDE 2.5 MG/ML
INJECTION, SOLUTION EPIDURAL; INFILTRATION; INTRACAUDAL
Status: DISPENSED
Start: 2020-03-04 | End: 2020-03-04

## 2020-03-04 RX ORDER — ONDANSETRON 8 MG/1
8 TABLET, ORALLY DISINTEGRATING ORAL EVERY 8 HOURS PRN
Status: DISCONTINUED | OUTPATIENT
Start: 2020-03-04 | End: 2020-03-05 | Stop reason: HOSPADM

## 2020-03-04 RX ORDER — DIPHENHYDRAMINE HYDROCHLORIDE 50 MG/ML
25 INJECTION INTRAMUSCULAR; INTRAVENOUS EVERY 4 HOURS PRN
Status: DISCONTINUED | OUTPATIENT
Start: 2020-03-04 | End: 2020-03-05 | Stop reason: HOSPADM

## 2020-03-04 RX ORDER — FENTANYL/BUPIVACAINE/NS/PF 2MCG/ML-.1
PLASTIC BAG, INJECTION (ML) INJECTION
Status: DISPENSED
Start: 2020-03-04 | End: 2020-03-04

## 2020-03-04 RX ORDER — FAMOTIDINE 10 MG/ML
20 INJECTION INTRAVENOUS ONCE
Status: DISCONTINUED | OUTPATIENT
Start: 2020-03-04 | End: 2020-03-05 | Stop reason: HOSPADM

## 2020-03-04 RX ORDER — CARBOPROST TROMETHAMINE 250 UG/ML
250 INJECTION, SOLUTION INTRAMUSCULAR
Status: DISCONTINUED | OUTPATIENT
Start: 2020-03-04 | End: 2020-03-04

## 2020-03-04 RX ORDER — CARBOPROST TROMETHAMINE 250 UG/ML
INJECTION, SOLUTION INTRAMUSCULAR
Status: DISCONTINUED
Start: 2020-03-04 | End: 2020-03-04 | Stop reason: WASHOUT

## 2020-03-04 RX ORDER — FENTANYL CITRATE 50 UG/ML
INJECTION, SOLUTION INTRAMUSCULAR; INTRAVENOUS
Status: DISCONTINUED | OUTPATIENT
Start: 2020-03-04 | End: 2020-03-04

## 2020-03-04 RX ORDER — FENTANYL/BUPIVACAINE/NS/PF 2MCG/ML-.1
PLASTIC BAG, INJECTION (ML) INJECTION CONTINUOUS
Status: DISCONTINUED | OUTPATIENT
Start: 2020-03-04 | End: 2020-03-05 | Stop reason: HOSPADM

## 2020-03-04 RX ORDER — DOCUSATE SODIUM 100 MG/1
200 CAPSULE, LIQUID FILLED ORAL 2 TIMES DAILY PRN
Status: DISCONTINUED | OUTPATIENT
Start: 2020-03-04 | End: 2020-03-05 | Stop reason: HOSPADM

## 2020-03-04 RX ORDER — MISOPROSTOL 200 UG/1
800 TABLET ORAL
Status: DISCONTINUED | OUTPATIENT
Start: 2020-03-04 | End: 2020-03-04

## 2020-03-04 RX ORDER — LIDOCAINE HYDROCHLORIDE AND EPINEPHRINE 15; 5 MG/ML; UG/ML
INJECTION, SOLUTION EPIDURAL
Status: DISCONTINUED | OUTPATIENT
Start: 2020-03-04 | End: 2020-03-04

## 2020-03-04 RX ORDER — OXYTOCIN/RINGER'S LACTATE 30/500 ML
95 PLASTIC BAG, INJECTION (ML) INTRAVENOUS ONCE
Status: DISCONTINUED | OUTPATIENT
Start: 2020-03-04 | End: 2020-03-05 | Stop reason: HOSPADM

## 2020-03-04 RX ORDER — METHYLERGONOVINE MALEATE 0.2 MG/ML
INJECTION INTRAVENOUS
Status: DISCONTINUED
Start: 2020-03-04 | End: 2020-03-04 | Stop reason: WASHOUT

## 2020-03-04 RX ORDER — HYDROCORTISONE 25 MG/G
CREAM TOPICAL 3 TIMES DAILY PRN
Status: DISCONTINUED | OUTPATIENT
Start: 2020-03-04 | End: 2020-03-05 | Stop reason: HOSPADM

## 2020-03-04 RX ORDER — OXYTOCIN/RINGER'S LACTATE 30/500 ML
95 PLASTIC BAG, INJECTION (ML) INTRAVENOUS ONCE
Status: COMPLETED | OUTPATIENT
Start: 2020-03-04 | End: 2020-03-04

## 2020-03-04 RX ORDER — SIMETHICONE 80 MG
1 TABLET,CHEWABLE ORAL EVERY 6 HOURS PRN
Status: DISCONTINUED | OUTPATIENT
Start: 2020-03-04 | End: 2020-03-05 | Stop reason: HOSPADM

## 2020-03-04 RX ORDER — ACETAMINOPHEN 325 MG/1
650 TABLET ORAL EVERY 6 HOURS PRN
Status: DISCONTINUED | OUTPATIENT
Start: 2020-03-04 | End: 2020-03-05 | Stop reason: HOSPADM

## 2020-03-04 RX ORDER — SODIUM CITRATE AND CITRIC ACID MONOHYDRATE 334; 500 MG/5ML; MG/5ML
30 SOLUTION ORAL ONCE
Status: DISCONTINUED | OUTPATIENT
Start: 2020-03-04 | End: 2020-03-05 | Stop reason: HOSPADM

## 2020-03-04 RX ORDER — FENTANYL/BUPIVACAINE/NS/PF 2MCG/ML-.1
PLASTIC BAG, INJECTION (ML) INJECTION CONTINUOUS PRN
Status: DISCONTINUED | OUTPATIENT
Start: 2020-03-04 | End: 2020-03-04

## 2020-03-04 RX ORDER — FENTANYL CITRATE 50 UG/ML
INJECTION, SOLUTION INTRAMUSCULAR; INTRAVENOUS
Status: COMPLETED
Start: 2020-03-04 | End: 2020-03-04

## 2020-03-04 RX ORDER — DIPHENHYDRAMINE HCL 25 MG
25 CAPSULE ORAL EVERY 4 HOURS PRN
Status: DISCONTINUED | OUTPATIENT
Start: 2020-03-04 | End: 2020-03-05 | Stop reason: HOSPADM

## 2020-03-04 RX ORDER — BUPIVACAINE HYDROCHLORIDE 2.5 MG/ML
INJECTION, SOLUTION INFILTRATION; PERINEURAL CONTINUOUS PRN
Status: DISCONTINUED | OUTPATIENT
Start: 2020-03-04 | End: 2020-03-04

## 2020-03-04 RX ORDER — MISOPROSTOL 200 UG/1
TABLET ORAL
Status: DISPENSED
Start: 2020-03-04 | End: 2020-03-04

## 2020-03-04 RX ADMIN — IBUPROFEN 600 MG: 600 TABLET, FILM COATED ORAL at 11:03

## 2020-03-04 RX ADMIN — Medication 95 MILLI-UNITS/MIN: at 06:03

## 2020-03-04 RX ADMIN — FENTANYL CITRATE 10 MCG: 50 INJECTION, SOLUTION INTRAMUSCULAR; INTRAVENOUS at 04:03

## 2020-03-04 RX ADMIN — DEXTROSE 3 MILLION UNITS: 50 INJECTION, SOLUTION INTRAVENOUS at 03:03

## 2020-03-04 RX ADMIN — Medication 334 MILLI-UNITS/MIN: at 05:03

## 2020-03-04 RX ADMIN — BUPIVACAINE HYDROCHLORIDE 1 ML/HR: 2.5 INJECTION, SOLUTION INFILTRATION; PERINEURAL at 04:03

## 2020-03-04 RX ADMIN — LIDOCAINE HYDROCHLORIDE,EPINEPHRINE BITARTRATE 3 ML: 15; .005 INJECTION, SOLUTION EPIDURAL; INFILTRATION; INTRACAUDAL; PERINEURAL at 04:03

## 2020-03-04 RX ADMIN — PENICILLIN G POTASSIUM 5 MILLION UNITS: 5000000 POWDER, FOR SOLUTION INTRAMUSCULAR; INTRAPLEURAL; INTRATHECAL; INTRAVENOUS at 12:03

## 2020-03-04 RX ADMIN — Medication 10 ML: at 04:03

## 2020-03-04 RX ADMIN — FENTANYL CITRATE 90 MCG: 50 INJECTION, SOLUTION INTRAMUSCULAR; INTRAVENOUS at 04:03

## 2020-03-04 RX ADMIN — Medication 10 ML/HR: at 04:03

## 2020-03-04 RX ADMIN — Medication 2 MILLI-UNITS/MIN: at 12:03

## 2020-03-04 NOTE — PROGRESS NOTES
"LABOR NOTE    S:  Pt sitting up in bed. MANINDER Reyes, is beside her and supportive. She is pausing and breathing through contractions and grimaces with each one. She states that her contractions are about 2 minutes apart at this time. Family at bedside and supportive.     O: /62   Pulse 61   Temp 98.3 °F (36.8 °C) (Oral)   Resp 18   Ht 5' 9" (1.753 m)   Wt 94.2 kg (207 lb 10.8 oz)   LMP 2019   SpO2 98%   Breastfeeding? No   BMI 30.67 kg/m²     GENERAL: Calm and appropriate affect  NEURO: Alert, oriented, normal speech  ABDOMEN: Nontender, Fundus palpates soft between UC's.  FHT: Baseline 120, moderate BTBV, positive accels, rare variable decel. Cat 2, reassuring.  CTX: q 2-3 minutes  SVE: 4/80/-2 soft, posterior    Bahena balloon fell out at approximately 0245 per RN    ASSESSMENT:   24 y.o.  IUP at 40w3d, FHT reassuring/ Cat 2    Patient Active Problem List   Diagnosis    HSV-2 seropositive    Group B Streptococcus urinary tract infection affecting pregnancy in third trimester    Beta thalassemia minor    Pregnancy with one fetus, antepartum    HSV (herpes simplex virus) anogenital infection    Iron deficiency anemia    Syncopal episodes    Birth Center Risk Assessment: 0- Meets birth center guidelines    Supervision of normal pregnancy    Oligohydramnios in third trimester         PLAN:  Continue close Maternal/Fetal monitoring  Pitocin Augmentation per protocol - currently infusing at 6 mu  Encourage rest/position changes  Hydrotherapy okay if patient desires and if FHTs remain reassuring  Anticipate   "

## 2020-03-04 NOTE — L&D DELIVERY NOTE
[facility]  Vaginal Delivery     SUMMARY     Normal spontaneous vaginal delivery of live infant, was placed on mothers abdomen for skin to skin and bulb suctioning performed.  Infant delivered position OA over intact perineum.  Nuchal cord: No.    Spontaneous delivery of placenta and IV pitocin given noting good uterine tone.  No lacerations noted.  Patient tolerated delivery well. Lap counted correctly x2.    Indications: Oligohydramnios in third trimester  Pregnancy complicated by:   Patient Active Problem List   Diagnosis    HSV-2 seropositive    Group B Streptococcus urinary tract infection affecting pregnancy in third trimester    Beta thalassemia minor    Pregnancy with one fetus, antepartum    HSV (herpes simplex virus) anogenital infection    Iron deficiency anemia    Syncopal episodes    Birth Center Risk Assessment: 0- Meets birth center guidelines    Supervision of normal pregnancy    Oligohydramnios in third trimester     Admitting GA: 40w3d    Delivery Information for Tai Spaulding    Birth information:  YOB: 2020   Time of birth: 5:13 AM   Sex: female   Head Delivery Date/Time: 3/4/2020  5:13 AM   Delivery type: Vaginal, Spontaneous   Gestational Age: 40w3d    Delivery Providers    Delivering clinician:  Donna Rodriguez CNM   Provider Role    ADRIANNA Black RN Emily A Hillis, RN [facility]             Measurements    Weight:    Length:           Apgars    Living status:  Living  Apgars:   1 min.:   5 min.:   10 min.:   15 min.:   20 min.:     Skin color:   1  1       Heart rate:   2  2       Reflex irritability:   2  2       Muscle tone:   2  2       Respiratory effort:   2  2       Total:   9  9       Apgars assigned by:  IVON CHAND RN         Operative Delivery    Forceps attempted?:  No  Vacuum extractor attempted?:  No         Shoulder Dystocia    Shoulder dystocia present?:  No            Presentation    Presentation:  Vertex  Position:  Occiput Anterior           Interventions/Resuscitation    Method:  Tactile Stimulation       Cord    Vessels:  3 vessels  Complications:  None  Delayed Cord Clamping?:  Yes  Cord Clamped Date/Time:  3/4/2020  5:30 AM  Cord Blood Disposition:  Sent with Baby  Gases Sent?:  No  Stem Cell Collection (by MD):  No       Placenta    Placenta delivery date/time:  3/4/2020 0545  Placenta removal:  Spontaneous  Placenta appearance:  Intact  Placenta disposition:  discarded           Labor Events:       labor: No     Labor Onset Date/Time:         Dilation Complete Date/Time:         Start Pushing Date/Time:         Start Pushing Date/Time:       Rupture Date/Time:              Rupture type:           Fluid Amount:        Fluid Color:        Fluid Odor:        Membrane Status: INT (Intact)               steroids: None     Antibiotics given for GBS: Yes     Induction: balloon dilation (Bahena);oxytocin     Indications for induction:  Fetal Abnormality     Augmentation: oxytocin     Indications for augmentation: Ineffective Contraction Pattern     Labor complications: None     Additional complications:          Cervical ripening:                     Delivery:      Episiotomy: None     Indication for Episiotomy:       Perineal Lacerations: None Repaired:      Periurethral Laceration:   Repaired:     Labial Laceration:   Repaired:     Sulcus Laceration:   Repaired:     Vaginal Laceration:   Repaired:     Cervical Laceration:   Repaired:     Repair suture:       Repair # of packets:       Last Value - EBL - Nursing (mL):       Sum - EBL - Nursing (mL): 0     Last Value - EBL - Anesthesia (mL):      Calculated QBL (mL):        Vaginal Sweep Performed:       Surgicount Correct: Yes       Other providers:       Anesthesia    Method:  Epidural          Details (if applicable):  Trial of Labor      Categorization:      Priority:      Indications for :     Incision Type:       Additional  information:  Forceps:    Vacuum:    Breech:    Observed anomalies    Other (Comments):

## 2020-03-04 NOTE — ANESTHESIA PROCEDURE NOTES
CSE    Patient location during procedure: OB  Start time: 3/4/2020 4:53 AM  Timeout: 3/4/2020 4:53 AM  End time: 3/4/2020 5:03 AM    Staffing  Authorizing Provider: Jaci Wilson MD  Performing Provider: Kavon Casanova MD    Preanesthetic Checklist  Completed: patient identified, site marked, surgical consent, pre-op evaluation, timeout performed, IV checked, risks and benefits discussed and monitors and equipment checked  CSE  Patient position: sitting  Prep: ChloraPrep  Patient monitoring: continuous pulse ox and frequent blood pressure checks  Approach: midline  Spinal Needle  Needle type: Mitchell   Needle gauge: 25 G  Needle length: 5 in  Epidural Needle  Injection technique: GERMAINE saline  Needle gauge: 17 G  Needle length: 3.5 in  Needle insertion depth: 5 cm  Location: L3-4  Needle localization: anatomical landmarks  Catheter  Catheter type: springwound  Catheter size: 19 G  Catheter at skin depth: 10 cm  Test dose: lidocaine 1.5% with Epi 1-to-200,000  Additional Documentation: incremental injection, negative aspiration for heme, negative aspiration for CSF, no paresthesia on injection and negative test dose

## 2020-03-04 NOTE — ANESTHESIA PREPROCEDURE EVALUATION
[facility]  Anesthesia Pre-Operative Evaluation         Patient Name: Stephy Spaulding  YOB: 1995  MRN: [id number]    2020      Stephy Spaulding is a 24 y.o. female  @ 40w2d with no significant PMHx who presents for IOL (oligohydramnios). Pregnancy otherwise complicated by syncopal episodes. Evaluated by cardiology with normal echo and EKG (sinus ).    Previous epidural with first delivery at [facility] did not provide adequate relief despite hand boluses and catheter manipulation. Bilateral T10 level achieved as patient could not feel her abdomen. Never replaced per patient. Natural childbirth with second delivery. Patient plans for natural birth with midwife. She denies spinal pathology, bleeding diathesis, or anticoagulant use.    OB History    Para Term  AB Living   3 2 2     2   SAB TAB Ectopic Multiple Live Births         0 1      # Outcome Date GA Lbr Dayron/2nd Weight Sex Delivery Anes PTL Lv   3 Current            2 Term 17 39w5d / 00:05 3.827 kg (8 lb 7 oz) F Vag-Spont None N HENRIK   1 Term 16 41w0d  3.033 kg (6 lb 11 oz) F Vag-Spont EPI N        Review of patient's allergies indicates:  No Known Allergies    Wt Readings from Last 1 Encounters:   20 1129 94.2 kg (207 lb 10.8 oz)       BP Readings from Last 3 Encounters:   20 118/66   20 126/62   20 126/62       Patient Active Problem List   Diagnosis    HSV-2 seropositive    Group B Streptococcus urinary tract infection affecting pregnancy in third trimester    Beta thalassemia minor    Pregnancy with one fetus, antepartum    HSV (herpes simplex virus) anogenital infection    Iron deficiency anemia    Syncopal episodes    Birth Center Risk Assessment: 0- Meets birth center guidelines       Past Surgical History:   Procedure Laterality Date    NO PAST SURGERIES         Social History     Socioeconomic History    Marital status: Single     Spouse name: Not on file     Number of children: Not on file    Years of education: Not on file    Highest education level: Not on file   Occupational History     Comment: Bartending at night   Social Needs    Financial resource strain: Not on file    Food insecurity:     Worry: Not on file     Inability: Not on file    Transportation needs:     Medical: Not on file     Non-medical: Not on file   Tobacco Use    Smoking status: Former Smoker     Types: Cigarettes    Smokeless tobacco: Never Used   Substance and Sexual Activity    Alcohol use: No     Comment: not with pregnancy    Drug use: No    Sexual activity: Yes     Partners: Male     Birth control/protection: None     Comment: Eric   Lifestyle    Physical activity:     Days per week: 3 days     Minutes per session: 20 min    Stress: To some extent   Relationships    Social connections:     Talks on phone: Not on file     Gets together: Not on file     Attends Evangelical service: Not on file     Active member of club or organization: Not on file     Attends meetings of clubs or organizations: Not on file     Relationship status: Not on file   Other Topics Concern    Not on file   Social History Narrative    FOB Eric    Daughter [employer]    She occasionally works at [employer] as      Lives with Eric in apartment in [place]             Chemistry        Component Value Date/Time     02/07/2020 2320    K 3.3 (L) 02/07/2020 2320     02/07/2020 2320    CO2 23 02/07/2020 2320    BUN 9 02/07/2020 2320    CREATININE 0.7 02/07/2020 2320    GLU 66 (L) 02/07/2020 2320        Component Value Date/Time    CALCIUM 8.7 02/07/2020 2320    ALKPHOS 109 02/07/2020 2320    AST 11 02/07/2020 2320    ALT 5 (L) 02/07/2020 2320    BILITOT 0.1 02/07/2020 2320    ESTGFRAFRICA >60 02/07/2020 2320    EGFRNONAA >60 02/07/2020 2320            Lab Results   Component Value Date    WBC 10.37 02/10/2020    HGB 10.0 (L) 02/10/2020    HCT 33.6 (L) 02/10/2020    MCV 70 (L) 02/10/2020    PLT  371 (H) 02/10/2020       No results for input(s): PT, INR, PROTIME, APTT in the last 72 hours.        Anesthesia Evaluation    I have reviewed the Patient Summary Reports.     I have reviewed the Medications.     Review of Systems  Anesthesia Hx:  No previous Anesthesia  Neg history of prior surgery. Denies Family Hx of Anesthesia complications.    Social:  Non-Smoker    Hematology/Oncology:  Hematology Normal        Cardiovascular:  Cardiovascular Normal     Pulmonary:   Asthma asymptomatic    Renal/:  Renal/ Normal     Hepatic/GI:  Hepatic/GI Normal    Musculoskeletal:  Musculoskeletal Normal    Neurological:  Neurology Normal    Endocrine:  Endocrine Normal        Physical Exam  General:  Well nourished    Airway/Jaw/Neck:  Airway Findings: Mouth Opening: Normal Tongue: Normal  General Airway Assessment: Adult  Mallampati: I  Jaw/Neck Findings:  Neck ROM: Normal ROM      Dental:  Dental Findings: In tact   Chest/Lungs:  Chest/Lungs Findings: Normal Respiratory Rate     Heart/Vascular:  Heart Findings: Rate: Normal  Rhythm: Regular Rhythm        Mental Status:  Mental Status Findings:  Cooperative, Alert and Oriented         Anesthesia Plan  Type of Anesthesia, risks & benefits discussed:  Anesthesia Type:  CSE, epidural, general, spinal  Patient's Preference:   Intra-op Monitoring Plan: standard ASA monitors  Intra-op Monitoring Plan Comments:   Post Op Pain Control Plan: multimodal analgesia, IV/PO Opioids PRN and per primary service following discharge from PACU  Post Op Pain Control Plan Comments:   Induction:    Beta Blocker:  Patient is not currently on a Beta-Blocker (No further documentation required).       Informed Consent: Patient understands risks and agrees with Anesthesia plan.  Questions answered. Anesthesia consent signed with patient.  ASA Score: 2     Day of Surgery Review of History & Physical:    H&P update referred to the provider.         Ready For Surgery From Anesthesia Perspective.

## 2020-03-04 NOTE — SUBJECTIVE & OBJECTIVE
"Obstetric HPI:    OB History    Para Term  AB Living   3 2 2 0 0 2   SAB TAB Ectopic Multiple Live Births   0 0 0 0 1      # Outcome Date GA Lbr Dayron/2nd Weight Sex Delivery Anes PTL Lv   3 Current            2 Term 17 39w5d / 00:05 3.827 kg (8 lb 7 oz) F Vag-Spont None N HENRIK      Name: CLIFF, GIRL SABAS      Apgar1: 9  Apgar5: 9   1 Term 16 41w0d  3.033 kg (6 lb 11 oz) F Vag-Spont EPI N       Name: John     Past Medical History:   Diagnosis Date    Asthma     as child only    Herpes simplex virus (HSV) infection     Has never had a lesion. States she had a "positive blood test" during her first pregnancy,? type    Syncopal episodes     Bradycardia-induced  6 months ago, one other episode as teenager (not dx with same cause)     Past Surgical History:   Procedure Laterality Date    NO PAST SURGERIES         PTA Medications   Medication Sig    ferrous sulfate 325 (65 FE) MG EC tablet Take 1 tablet (325 mg total) by mouth 2 (two) times daily.    PNV no.95/ferrous fum/folic ac (PRENATAL ORAL) Take 1 tablet by mouth.    valACYclovir (VALTREX) 500 MG tablet Take 1 tablet (500 mg total) by mouth 2 (two) times daily.       Review of patient's allergies indicates:  No Known Allergies     Family History     Problem Relation (Age of Onset)    Emphysema Paternal Grandfather    Restless legs syndrome Maternal Grandmother, Mother    Sickle cell trait Sister        Tobacco Use    Smoking status: Former Smoker     Types: Cigarettes    Smokeless tobacco: Never Used   Substance and Sexual Activity    Alcohol use: No     Comment: not with pregnancy    Drug use: No    Sexual activity: Yes     Partners: Male     Birth control/protection: None     Comment: Eric     Review of Systems   Constitutional: Negative for activity change and fatigue.   HENT: Negative for nasal congestion.    Eyes: Negative for discharge and visual disturbance.   Respiratory: Negative for cough and shortness of breath.  "   Cardiovascular: Negative for chest pain.   Gastrointestinal: Negative for abdominal pain, nausea and vomiting.   Endocrine: Negative for diabetes.   Genitourinary: Negative for flank pain, vaginal bleeding, vaginal discharge, vaginal pain and vaginal odor.   Musculoskeletal: Negative for arthralgias.   Integumentary:  Negative for hair changes.   Neurological: Negative for syncope and headaches.   Hematological: Does not bruise/bleed easily.   Psychiatric/Behavioral: Negative for depression. The patient is not nervous/anxious.       Objective:     Vital Signs (Most Recent):  Temp: 98.3 °F (36.8 °C) (03/03/20 2207)  Pulse: 68 (03/03/20 2317)  Resp: 18 (03/03/20 2207)  BP: 124/71 (03/03/20 2317)  SpO2: 100 % (03/03/20 2317) Vital Signs (24h Range):  Temp:  [98.3 °F (36.8 °C)] 98.3 °F (36.8 °C)  Pulse:  [64-93] 68  Resp:  [18] 18  SpO2:  [100 %] 100 %  BP: (118-135)/(63-71) 124/71     Weight: 94.2 kg (207 lb 10.8 oz)  Body mass index is 30.67 kg/m².    FHT: Cat 2 (reassuring)  Baseline 120, moderate variability, rare variable.  TOCO:  Q occasional minutes (very mild- patient does not perceive these)    Physical Exam:   Constitutional: She is oriented to person, place, and time. She appears well-developed and well-nourished. No distress.    HENT:   Head: Normocephalic and atraumatic.    Eyes: EOM are normal. Right eye exhibits no discharge. Left eye exhibits no discharge.    Neck: Normal range of motion. Neck supple.    Cardiovascular: Normal rate.     Pulmonary/Chest: Effort normal. No respiratory distress.        Abdominal: Soft.     Genitourinary: Vagina normal and uterus normal. No vaginal discharge found.   Genitourinary Comments: Sterile speculum exam reveals no lesions on perineum, labia, vagina, or cervix           Musculoskeletal: Normal range of motion and moves all extremeties. She exhibits no edema.       Neurological: She is alert and oriented to person, place, and time.    Skin: Skin is warm and dry. She  is not diaphoretic.    Psychiatric: She has a normal mood and affect. Her behavior is normal. Judgment and thought content normal.       Cervix:  Dilation:  1  Effacement:  60%  Station: -3  Presentation: Vertex     Significant Labs:  Lab Results   Component Value Date    GROUPTRH AB POS 08/06/2019    HEPBSAG Negative 08/06/2019    STREPBCULT No Group B Streptococcus isolated 02/05/2020       CBC: No results for input(s): WBC, RBC, HGB, HCT, PLT, MCV, MCH, MCHC in the last 48 hours.  I have personally reviewed all pertinent lab results from the last 24 hours.

## 2020-03-04 NOTE — HPI
History of Present Illness:   Stephy Spaulding is a 24 y.o. female  at 40w2d with Estimated Date of Delivery: 3/1/20 based on LMP who presents to Labor and Delivery for IOL due to oligohydramnios accompanied by FOB and a family member.    Denies contractions, active fetal movement, No vaginal bleeding , No loss of fluid.     This pregnancy has been complicated by   Patient Active Problem List   Diagnosis    HSV-2 seropositive    Group B Streptococcus urinary tract infection affecting pregnancy in third trimester    Beta thalassemia minor    Pregnancy with one fetus, antepartum    HSV (herpes simplex virus) anogenital infection    Iron deficiency anemia    Syncopal episodes    Birth Center Risk Assessment: 0- Meets birth center guidelines    Supervision of normal pregnancy        Presentation: Vertex  Estimated Fetal Weight: 7lbs    Birth Center Risk Assessment: 1-management on labor and Delivery due to IOL    0- CNM management in ABC  1- CNM management on L&D  2- Consultation with OB to develop  plan of care  3- Collaborative CNM/OB management with delivery on L&D   4- Permanent referral of care to MD

## 2020-03-04 NOTE — H&P
[facility]  Obstetrics  History & Physical    Patient Name: Stephy Spaulding  MRN: [id number]  Admission Date: 3/3/2020  Primary Care Provider: Stephanie Li MD    Subjective:     Principal Problem:Oligohydramnios in third trimester    History of Present Illness:  History of Present Illness:   Stephy Spaulding is a 24 y.o. female  at 40w2d with Estimated Date of Delivery: 3/1/20 based on LMP who presents to Labor and Delivery for IOL due to oligohydramnios accompanied by FOB and a family member.    Denies contractions, active fetal movement, No vaginal bleeding , No loss of fluid.     This pregnancy has been complicated by   Patient Active Problem List   Diagnosis    HSV-2 seropositive    Group B Streptococcus urinary tract infection affecting pregnancy in third trimester    Beta thalassemia minor    Pregnancy with one fetus, antepartum    HSV (herpes simplex virus) anogenital infection    Iron deficiency anemia    Syncopal episodes    Birth Center Risk Assessment: 0- Meets birth center guidelines    Supervision of normal pregnancy        Presentation: Vertex  Estimated Fetal Weight: 7lbs    Birth Center Risk Assessment: 1-management on labor and Delivery due to IOL    0- CNM management in ABC  1- CNM management on L&D  2- Consultation with OB to develop  plan of care  3- Collaborative CNM/OB management with delivery on L&D   4- Permanent referral of care to MD    Obstetric HPI:    OB History    Para Term  AB Living   3 2 2 0 0 2   SAB TAB Ectopic Multiple Live Births   0 0 0 0 1      # Outcome Date GA Lbr Dayron/2nd Weight Sex Delivery Anes PTL Lv   3 Current            2 Term 17 39w5d / 00:05 3.827 kg (8 lb 7 oz) F Vag-Spont None N HENRIK      Name: CLIFF, GIRL STEPHY      Apgar1: 9  Apgar5: 9   1 Term 16 41w0d  3.033 kg (6 lb 11 oz) F Vag-Spont EPI N       Name: Jilsanjay     Past Medical History:   Diagnosis Date    Asthma     as child only    Herpes simplex virus (HSV)  "infection     Has never had a lesion. States she had a "positive blood test" during her first pregnancy,? type    Syncopal episodes     Bradycardia-induced  6 months ago, one other episode as teenager (not dx with same cause)     Past Surgical History:   Procedure Laterality Date    NO PAST SURGERIES         PTA Medications   Medication Sig    ferrous sulfate 325 (65 FE) MG EC tablet Take 1 tablet (325 mg total) by mouth 2 (two) times daily.    PNV no.95/ferrous fum/folic ac (PRENATAL ORAL) Take 1 tablet by mouth.    valACYclovir (VALTREX) 500 MG tablet Take 1 tablet (500 mg total) by mouth 2 (two) times daily.       Review of patient's allergies indicates:  No Known Allergies     Family History     Problem Relation (Age of Onset)    Emphysema Paternal Grandfather    Restless legs syndrome Maternal Grandmother, Mother    Sickle cell trait Sister        Tobacco Use    Smoking status: Former Smoker     Types: Cigarettes    Smokeless tobacco: Never Used   Substance and Sexual Activity    Alcohol use: No     Comment: not with pregnancy    Drug use: No    Sexual activity: Yes     Partners: Male     Birth control/protection: None     Comment: Eric     Review of Systems   Constitutional: Negative for activity change and fatigue.   HENT: Negative for nasal congestion.    Eyes: Negative for discharge and visual disturbance.   Respiratory: Negative for cough and shortness of breath.    Cardiovascular: Negative for chest pain.   Gastrointestinal: Negative for abdominal pain, nausea and vomiting.   Endocrine: Negative for diabetes.   Genitourinary: Negative for flank pain, vaginal bleeding, vaginal discharge, vaginal pain and vaginal odor.   Musculoskeletal: Negative for arthralgias.   Integumentary:  Negative for hair changes.   Neurological: Negative for syncope and headaches.   Hematological: Does not bruise/bleed easily.   Psychiatric/Behavioral: Negative for depression. The patient is not nervous/anxious.     "   Objective:     Vital Signs (Most Recent):  Temp: 98.3 °F (36.8 °C) (03/03/20 2207)  Pulse: 68 (03/03/20 2317)  Resp: 18 (03/03/20 2207)  BP: 124/71 (03/03/20 2317)  SpO2: 100 % (03/03/20 2317) Vital Signs (24h Range):  Temp:  [98.3 °F (36.8 °C)] 98.3 °F (36.8 °C)  Pulse:  [64-93] 68  Resp:  [18] 18  SpO2:  [100 %] 100 %  BP: (118-135)/(63-71) 124/71     Weight: 94.2 kg (207 lb 10.8 oz)  Body mass index is 30.67 kg/m².    FHT: Cat 2 (reassuring)  Baseline 120, moderate variability, rare variable.  TOCO:  Q occasional minutes (very mild- patient does not perceive these)    Physical Exam:   Constitutional: She is oriented to person, place, and time. She appears well-developed and well-nourished. No distress.    HENT:   Head: Normocephalic and atraumatic.    Eyes: EOM are normal. Right eye exhibits no discharge. Left eye exhibits no discharge.    Neck: Normal range of motion. Neck supple.    Cardiovascular: Normal rate.     Pulmonary/Chest: Effort normal. No respiratory distress.        Abdominal: Soft.     Genitourinary: Vagina normal and uterus normal. No vaginal discharge found.   Genitourinary Comments: Sterile speculum exam reveals no lesions on perineum, labia, vagina, or cervix           Musculoskeletal: Normal range of motion and moves all extremeties. She exhibits no edema.       Neurological: She is alert and oriented to person, place, and time.    Skin: Skin is warm and dry. She is not diaphoretic.    Psychiatric: She has a normal mood and affect. Her behavior is normal. Judgment and thought content normal.       Cervix:  Dilation:  1.5  Effacement:  60%  Station: -3  Presentation: Vertex     Significant Labs:  Lab Results   Component Value Date    GROUPTRH AB POS 08/06/2019    HEPBSAG Negative 08/06/2019    STREPBCULT No Group B Streptococcus isolated 02/05/2020       CBC: No results for input(s): WBC, RBC, HGB, HCT, PLT, MCV, MCH, MCHC in the last 48 hours.  I have personally reviewed all pertinent lab  results from the last 24 hours.    Assessment/Plan:     24 y.o. female  at 40w3d for:    * Oligohydramnios in third trimester  3/3/20 2315 IOL at 41w2d  Continuous FHM and toco  SVE 1.5/60/-3 intact, soft  Discussed IOL with patient and she agrees with plan to proceed  Bahena bulb placed  Plan to start pitocin per protocol within next hour  Encourage rest/position changes  Anticipate     Iron deficiency anemia  CBC will be drawn with this admission    HSV (herpes simplex virus) anogenital infection  3/3/20 2315 Sterile spec exam revealed no lesions    Beta thalassemia minor  Will draw CBC with this admission.    Group B Streptococcus urinary tract infection affecting pregnancy in third trimester  Discussed with MD. Will treat in labor with PCN.    Donna Rodriguez CNM  Obstetrics  [facility], [place], operated by [facility]

## 2020-03-04 NOTE — LACTATION NOTE
"Pt reports infant feeding well, denies pain with latch, "you can already hear her drinking." Lactation Basics education completed. LC reviewed Breastfeeding Guide and encouraged tracking feeds and output. Encouraged use of STS, frequent feeds on demand, and avoiding artificial nipples. Pt verbalized understanding and questions answered. Pt aware to call LC for assistance with feeding, LC number on board.    "

## 2020-03-04 NOTE — HOSPITAL COURSE
3/3/20 1872 Patient admitted to L&D for IOL due to oligohydramnios  3/4/2020   3/5/2020 Nl PP day #1, may go home today if baby discharged

## 2020-03-05 VITALS
HEIGHT: 69 IN | RESPIRATION RATE: 18 BRPM | WEIGHT: 207.69 LBS | HEART RATE: 56 BPM | DIASTOLIC BLOOD PRESSURE: 63 MMHG | BODY MASS INDEX: 30.76 KG/M2 | OXYGEN SATURATION: 99 % | TEMPERATURE: 98 F | SYSTOLIC BLOOD PRESSURE: 127 MMHG

## 2020-03-05 PROCEDURE — 0503F PR POSTPARTUM CARE VISIT: ICD-10-PCS | Mod: ,,, | Performed by: ADVANCED PRACTICE MIDWIFE

## 2020-03-05 PROCEDURE — 0503F POSTPARTUM CARE VISIT: CPT | Mod: ,,, | Performed by: ADVANCED PRACTICE MIDWIFE

## 2020-03-05 PROCEDURE — 25000003 PHARM REV CODE 250: Performed by: ADVANCED PRACTICE MIDWIFE

## 2020-03-05 RX ORDER — TRIPROLIDINE/PSEUDOEPHEDRINE 2.5MG-60MG
600 TABLET ORAL EVERY 6 HOURS PRN
Status: DISCONTINUED | OUTPATIENT
Start: 2020-03-05 | End: 2020-03-05 | Stop reason: HOSPADM

## 2020-03-05 RX ADMIN — IBUPROFEN 600 MG: 600 TABLET, FILM COATED ORAL at 07:03

## 2020-03-05 RX ADMIN — PRENATAL VIT W/ FE FUMARATE-FA TAB 27-0.8 MG 1 TABLET: 27-0.8 TAB at 09:03

## 2020-03-05 NOTE — PLAN OF CARE
Based on pt's stated feeding goals, the Plan of care for today is for pt to do skin-to-skin, to feed frequently on demand, to observe for signs of effective milk transfer, to monitor voids and stools, and to call for assistance. Pt verbalizes understanding.

## 2020-03-05 NOTE — DISCHARGE INSTRUCTIONS
Follow Up/Patient Instructions:   1. Reviewed postpartum recommendations and precautions ~ encouraged to call for fever, severe or increased pain in head, chest, abdomen, perineum or legs; heavy bleeding, foul smelling lochia, signs of depression, or any other concern. Reviewed postpartum precautions r/t high blood pressure ~ encouraged to call for HA, vision changes, epigastric discomfort, or abnormal swelling.  2. Rx provided: none  3. Contraception: considering Natural Family Planning and has used it before affectively; will f/u at postpartum visit. Encouraged abstinence and pelvic rest for healing until after postpartum check-up.   4. Encouraged to continue PNV while breastfeeding or at least for 6 weeks postpartum. Continue to take Fe twice a day per instructions.  5. Car seat law will be reviewed with patient at discharge per protocol  6. RTO in 4-6 weeks or sooner prn.  7. Discharge home today with written and verbal postpartum instructions and precautions.       Breastfeeding Discharge Instructions       Feed the baby at the earliest sign of hunger or comfort  o Hands to mouth, sucking motions  o Rooting or searching for something to suck on  o Dont wait for crying - it is a sign of distress     The feedings may be 8-12 times per 24hrs and will not follow a schedule   Avoid pacifiers and bottles for the first 4 weeks   Alternate the breast you start the feeding with, or start with the breast that feels the fullest   Switch breasts when the baby takes himself off the breast or falls asleep   Keep offering breasts until the baby looks full, no longer gives hunger signs, and stays asleep when placed on his back in the crib   If the baby is sleepy and wont wake for a feeding, put the baby skin-to-skin dressed in a diaper against the mothers bare chest   Sleep near your baby   The baby should be positioned and latched on to the breast correctly  o Chest-to-chest, chin in the breast  o Babys lips  are flipped outward  o Babys mouth is stretched open wide like a shout  o Babys sucking should feel like tugging to the mother  - The baby should be drinking at the breast:  o You should hear swallowing or gulping throughout the feeding  o You should see milk on the babys lips when he comes off the breast  o Your breasts should be softer when the baby is finished feeding  o The baby should look relaxed at the end of feedings  o After the 4th day and your milk is in:  o The babys poop should turn bright yellow and be loose, watery, and seedy  o The baby should have at least 3-4 poops the size of the palm of your hand per day  o The baby should have at least 5-6 wet diapers per day  o The urine should be light yellow in color  You should drink when you are thirsty and eat a healthy diet when you are    hungry.     Take naps to get the rest you need.   Take medications and/or drink alcohol only with permission of your obstetrician    or the babys pediatrician.  You can also call the Infant Risk Center,   ([phone]), Monday-Friday, 8am-5pm Central time, to get the most   up-to-date evidence-based information on the use of medications during   pregnancy and breastfeeding.      The baby should be examined by a pediatrician at 3-5 days of age.  Once your   milk comes in, the baby should be gaining at least ½ - 1oz each day and should be back to birthweight no later than 10-14 days of age.          Community Resources    [facility] Breastfeeding Warmline: [phone]   Local [facility] clinics: provide incentives and breastpumps to eligible mothers  La Leche League International (LLLI):  mother-to-mother support group website        www.lll.org  Local La Leche League mother-to-mother support groups:        www.lllIncreaseCard.[employer]iaSoft        La Leche League of [place]   Dr. Vlad Benitez website for latch videos and general information:        www.breastfeedinginc.ca  [facility] Risk [facility] is a call center that  provides information about the safety of taking medications while breastfeeding.  Call 1-610.404.2482, LILIANEF, 8am-5pm, [place].  International Lactation Consultant Association provides resources for assistance:        www.ilca.org  [place] Breastfeeding Coalition provides informationand resources for parents  and the community    www.LaBreastfeedingSupport.org     Tona Parisi is a mom-to-mom support group:                             www.nolanesting."[employer]//breastfeedng-support/  Partners for Healthy Babies:  3-627-418-BABY(8567)  [employer]: a breastfeeding support group for women of color, [phone]

## 2020-03-05 NOTE — ANESTHESIA POSTPROCEDURE EVALUATION
Anesthesia Post Evaluation    Patient: Stephy Spaulding    Procedure(s) Performed: * No procedures listed *    Final Anesthesia Type: epidural    Patient location during evaluation: floor  Patient participation: Yes- Able to Participate  Level of consciousness: awake and alert  Post-procedure vital signs: reviewed and stable  Pain management: adequate  Airway patency: patent    PONV status at discharge: No PONV  Anesthetic complications: no      Cardiovascular status: blood pressure returned to baseline  Respiratory status: unassisted, spontaneous ventilation and room air  Hydration status: euvolemic  Follow-up not needed.          Vitals Value Taken Time   /63 3/5/2020  8:20 AM   Temp 36.7 °C (98.1 °F) 3/5/2020  8:20 AM   Pulse 56 3/5/2020  8:20 AM   Resp 18 3/5/2020  8:20 AM   SpO2 99 % 3/5/2020  8:20 AM         No case tracking events are documented in the log.      Pain/Robb Score: Pain Rating Prior to Med Admin: 2 (3/5/2020  7:37 AM)  Pain Rating Post Med Admin: 2 (3/5/2020 12:44 AM)

## 2020-03-05 NOTE — LACTATION NOTE
03/05/20 1114   Maternal Assessment   Breast Shape Right:;pendulous   Breast Density filling   Areola elastic   Nipples everted   Maternal Infant Feeding   Maternal Emotional State independent   Infant Positioning other (see comments)  (baby on back)   Signs of Milk Transfer audible swallow   Pain with Feeding no   Latch Assistance no   Pt positioning baby to feed. Baby on back. LC reviewed positions to assist with obtaining a deep latch. Pt declined any assistance with positioning. Wide gape with audible swallows and tugs and pulls noted. Pt denies any discomfort or pain with feeding. Lactation discharge education completed. Plan of care is for pt to follow basic breastfeeding education, frequent feeding based on baby's cues, and to monitor baby's voids and stools. Breastfeeding guide, including First Alert survey, resource list, and lactation warmline phone number reviewed. Pt to notify doctor for maternal or infant concerns, as reviewed with LC. Pt verbalizes understanding and questions answered.

## 2020-03-05 NOTE — DISCHARGE SUMMARY
[facility]  Obstetrics  Discharge Summary      Patient Name: Stephy Spaulding  MRN: [id number]  Admission Date: 3/3/2020  Hospital Length of Stay: 2 days  Discharge Date and Time:  2020 10:02 AM  Attending Physician: Sarah Leon MD   Discharging Provider: Wendy D Gerhardt, CNM   Primary Care Provider: Stephanie Li MD    HPI:   History of Present Illness:   Stephy Spaulding is a 24 y.o. female  at 40w2d with Estimated Date of Delivery: 3/1/20 based on LMP who presents to Labor and Delivery for IOL due to oligohydramnios accompanied by FOB and a family member.    Denies contractions, active fetal movement, No vaginal bleeding , No loss of fluid.     This pregnancy has been complicated by   Patient Active Problem List   Diagnosis    HSV-2 seropositive    Group B Streptococcus urinary tract infection affecting pregnancy in third trimester    Beta thalassemia minor    Pregnancy with one fetus, antepartum    HSV (herpes simplex virus) anogenital infection    Iron deficiency anemia    Syncopal episodes    Birth Center Risk Assessment: 0- Meets birth center guidelines    Supervision of normal pregnancy        Presentation: Vertex  Estimated Fetal Weight: 7lbs    Birth Center Risk Assessment: 1-management on labor and Delivery due to IOL    0- CNM management in ABC  1- CNM management on L&D  2- Consultation with OB to develop  plan of care  3- Collaborative CNM/OB management with delivery on L&D   4- Permanent referral of care to MD        * No surgery found *     Hospital Course:   3/3/20 2315 Patient admitted to L&D for IOL due to oligohydramnios  3/4/2020   3/5/2020 Nl PP day #1, may go home today if baby discharged     Consults (From admission, onward)        Status Ordering Provider     Consult to Lactation  Use PRN     Provider:  (Not yet assigned)    Acknowledged SUNI MCNULTY          Final Active Diagnoses:    Diagnosis Date Noted POA    PRINCIPAL PROBLEM:    (normal spontaneous vaginal delivery) [O80] 2020 Not Applicable    Breast feeding status of mother [Z39.1] 2020 Not Applicable    Syncopal episodes [R55] 2020 Yes    Iron deficiency anemia [D50.9] 2020 Yes    HSV (herpes simplex virus) anogenital infection [A60.9] 2020 Yes    Beta thalassemia minor [D56.3] 2019 Yes      Problems Resolved During this Admission:    Diagnosis Date Noted Date Resolved POA    Oligohydramnios in third trimester [O41.03X0] 2020 Unknown    Supervision of normal pregnancy [Z34.90] 2020 Not Applicable    Pregnancy with one fetus, antepartum [Z34.90] 10/02/2019 2020 Not Applicable    Group B Streptococcus urinary tract infection affecting pregnancy in third trimester [O23.43, B95.1] 2017 Yes        Labs: All labs within the past 24 hours have been reviewed    Feeding Method: breast    Immunizations     None          Delivery:    Episiotomy: None   Lacerations: None   Repair suture: None   Repair # of packets:     Blood loss (ml):       Birth information:  YOB: 2020   Time of birth: 5:13 AM   Sex: female   Delivery type: Vaginal, Spontaneous   Gestational Age: 40w3d    Delivery Clinician:      Other providers:       Additional  information:  Forceps:    Vacuum:    Breech:    Observed anomalies      Living?:           APGARS  One minute Five minutes Ten minutes   Skin color:         Heart rate:         Grimace:         Muscle tone:         Breathing:         Totals: 9  9        Placenta: Delivered:       appearance    Pending Diagnostic Studies:     None          Discharged Condition: good    Disposition: Home or Self Care    Follow Up:  Follow-up Information     [facility]    Specialty:  Obstetrics and Gynecology  Contact information:  [address] [facility] [address] [phone]  Additional information:  Alternative Birthing  [place] [facility] ([facility] 4th Floor   Please park in [place]               Patient Instructions:   No discharge procedures on file.  Medications:  Current Discharge Medication List      CONTINUE these medications which have NOT CHANGED    Details   ferrous sulfate 325 (65 FE) MG EC tablet Take 1 tablet (325 mg total) by mouth 2 (two) times daily.  Qty: 60 tablet, Refills: 3    Associated Diagnoses: Anemia during pregnancy in third trimester      PNV no.95/ferrous fum/folic ac (PRENATAL ORAL) Take 1 tablet by mouth.         STOP taking these medications       valACYclovir (VALTREX) 500 MG tablet Comments:   Reason for Stopping:               Wendy D Gerhardt, CNM  Obstetrics  [facility]

## 2020-03-05 NOTE — PROGRESS NOTES
[facility]  Obstetrics  Postpartum Progress Note    Patient Name: Stephy Spaulding  MRN: [id number]  Admission Date: 3/3/2020  Hospital Length of Stay: 2 days  Attending Physician: Sarah Leon MD  Primary Care Provider: Stephanie Li MD    Subjective:     Principal Problem: (normal spontaneous vaginal delivery)    No new subjective & objective note has been filed under this hospital service since the last note was generated.    Assessment/Plan:     24 y.o. female  for:    Iron deficiency anemia  CBC will be drawn with this admission    HSV (herpes simplex virus) anogenital infection  3/3/20 6020 Sterile spec exam revealed no lesions    Beta thalassemia minor  Will draw CBC with this admission.    Doing well, ambulating, voiding, and tolerating regular diet  Lochia: steadily decreasing  Pain: well controlled with Tylenol or Motrin   Breasts/nipples: breast feeding well without difficulty; good and frequent lactation  Depression/anxiety: none   Support at home: yes, has help with other kids  Contraception: considering NFP; understands that progesterone only options are appropriate with breastfeeding  : Baby girl is doing well, will f/u with pediatrician     Gen: A&O x 4, NAD  CV: normal HR  Lungs: normal resp effort  Breasts: bilaterally soft, non-tender, nipples intact almita  Abdomen: soft, non-tender, uterus firm at U - @U fb  Perineum: approximated, no edema   Lochia: minimal rubra  Ext: bilaterally neg pedal edema without signs of DVT    Disposition: As patient meets milestones, will plan to discharge today.    Wendy D Gerhardt, CNM  Obstetrics  [facility]

## 2020-03-09 ENCOUNTER — PATIENT MESSAGE (OUTPATIENT)
Dept: LACTATION | Facility: CLINIC | Age: 25
End: 2020-03-09

## 2020-03-13 NOTE — PROGRESS NOTES
Chief Complaint   Patient presents with    Routine Prenatal Visit       24 y.o. female  at 40w3d, by Estimated Date of Delivery: 3/1/20    Complaints today: none. Doing well today.  Reviewed TWG: 15 lbs    ROS  OBSTETRICS:   Contractions No   Bleeding No   Loss of fluid No   Fetal mvmnt present  GASTRO:   Nausea No   Vomiting No      OB History    Para Term  AB Living   3 3 3     3   SAB TAB Ectopic Multiple Live Births         0 2      # Outcome Date GA Lbr Dayron/2nd Weight Sex Delivery Anes PTL Lv   3 Term 20 40w3d  3.09 kg (6 lb 13 oz) F Vag-Spont EPI N HENRIK   2 Term 17 39w5d / 00:05 3.827 kg (8 lb 7 oz) F Vag-Spont None N HENRIK   1 Term 16 41w0d  3.033 kg (6 lb 11 oz) F Vag-Spont EPI N        Dating reviewed  Allergies and problem list reviewed and updated  Medical and surgical history reviewed  Prenatal labs reviewed and updated    PHYSICAL EXAM  /64   Wt 92.4 kg (203 lb 9.5 oz)   LMP 2019   BMI 30.07 kg/m²     GENERAL: No acute distress  HEENT: Normocephalic, atraumatic  NEURO: Alert and oriented x3  PSYCH: Normal mood and affect  PULMONARY: Non-labored respiration; no tachypnea  ABD: Soft, gravid, nontender.      ASSESSMENT AND PLAN    Pregnancy #3 Problems (from 19 to present)     Problem Noted Resolved    Iron deficiency anemia 2020 by Corin Mcgregor CNM No    Overview Signed 2020  2:27 PM by Donna Rodriguez CNM     (x) Taking PO iron         Beta thalassemia minor 2019 by Corin Mcgregor CNM No    Overview Addendum 2020  2:43 PM by Donna Rodriguez CNM     2/10/20 H&H 10 & 33  20 Patient states that she has no history of hemorrhagic or thrombotic events         HSV-2 seropositive 2017 by Joyce Lees CNM No    Birth Center Risk Assessment: 0- Meets birth center guidelines 2020 by Donna Rodriguez CNM 3/4/2020 by Donna Rodriguez CNM    Overview Addendum 2020  8:36 PM by Donna HOLLOWAY  Champagne, CNM     Birth Center Risk Assessment: 0- Meets birth center guidelines    0- CNM management in ABC  1- CNM management on L&D  2- Consultation with OB to develop  plan of care  3- Collaborative CNM/OB management with delivery on L&D  4- Permanent referral of care to MD             Pregnancy with one fetus, antepartum 10/2/2019 by Uyen Ortiz CNM 3/4/2020 by Donna Rodriguez CNM    Overview Addendum 2020  2:35 PM by Donna Rodriguez CNM     Prepregnancy BMI: 28 - ABC max 38#  Pap: 2019 - NILM  Dating - per LMP consistent with 19 wk sonogram  Prenatal labs X  U/S - WNL   Aneuploidy screening - negative Quad screen  (AB POS)  GDM screen - Passed 1 hr  Vaccines - [ ]Flu [ ]TDAP  GBS positive urine and was treated, then negative swab  [x]Consents  Contraception -  Peds -   Circ -            10 weeks gestation of pregnancy 2019 by Corin Mcgregor CNM 10/2/2019 by Uyen Ortiz CNM          GBS collected today   [facility]ed [facility] recommendation for repeat HIV/RPR today; she is open orders in.     Reviewed warning signs, normal FM,  labor precautions, and how/when to call.    Pateint advised to take valtrex daily  Follow-up: 1 week, call or present sooner PRN  Corin Mcgregor CNM

## 2020-03-25 ENCOUNTER — PATIENT MESSAGE (OUTPATIENT)
Dept: ADMINISTRATIVE | Facility: OTHER | Age: 25
End: 2020-03-25

## 2020-12-24 NOTE — TELEPHONE ENCOUNTER
"Talked per phone with Stephy regarding normal urine SHAE culture, "No significant growth."  " 25 y/o f w/ pmhx of anxiety, depressions, hypothyroidism presented to ed for b/l ear tinnitus R>L associated with rhinorrhea , dizziness like room is spinning, nausea, and band like headache, throbbing, from back of head, not worse headache, not sudden, gradually worsened, since last night. went to urgent care and was sent to ed for further evaluation. lmp 12/15. no alleviating factors, worse with movement or going to standing position. denies fever, chills, v, cp, sob, pleuritic cp, palpitations, diaphoresis, cough, ear pain, hearing loss, neck pain/stiffness, back pain, photophobia/phonophobia, blurry vision/visual changes, abd pain, diarrhea, constipation, melena/brbpr, urinary symptoms, weakness, numbness/tingling, syncope, sick contacts, recent travel or rash. never had anything like this before.     on exam:   Constitutional: wdwn female sitting on stretcher in nad.  Skin: no rash, no signs of trauma:  HEENT: PERRL, EOM intact, (+) L sided horizontal nystagmus that easily fatigues, TM's visualized b/l w/ good cone of light, no erythema or effusions, no cerumen impaction, mmm. No tongue deviation.  NECK and BACK: neck supple, no spinous ttp to neck or back, FROM, no palpable shelves or step offs, no meningeal signs.  CARDIO: regular rate, radial pulses 2/4 b/l, dp and pt pulses 2/4 b/l.  Lungs: Ctabl w/ breath sounds present b/l, no wheezing or crackles,no accessory muscle use, no tachypnea, no stridor  ABD: BS present throughout all 4 quadrants, abd soft, nd, nt, no rebound tenderness or guarding, no cvat,;  EXT: FROM of upper and lower ext, no drift, no calf pain/swelling/erythema.  NEURO: AAOx3. Motor 5/5 and sensation intact throughout upper and lower ext. CN II-XII intact. No facial droop or slurring of speech. (-) Pronator (-) Romberg, HiNTS negative, no dysmetria w/ ftn or rapid alternating fine movements, heel to shin intact, ambulating with no ataxia or difficulty, but reports dizziness when trying to go stand. NIH O.

## 2023-06-28 DIAGNOSIS — Z32.01 POSITIVE PREGNANCY TEST: Primary | ICD-10-CM

## 2023-09-18 ENCOUNTER — OFFICE VISIT (OUTPATIENT)
Dept: OBSTETRICS AND GYNECOLOGY | Facility: CLINIC | Age: 28
End: 2023-09-18
Payer: MEDICAID

## 2023-09-18 VITALS
BODY MASS INDEX: 24.85 KG/M2 | DIASTOLIC BLOOD PRESSURE: 66 MMHG | SYSTOLIC BLOOD PRESSURE: 120 MMHG | HEIGHT: 69 IN | WEIGHT: 167.75 LBS

## 2023-09-18 DIAGNOSIS — K11.7 PTYALISM: ICD-10-CM

## 2023-09-18 DIAGNOSIS — Z32.01 POSITIVE PREGNANCY TEST: Primary | ICD-10-CM

## 2023-09-18 PROCEDURE — 3074F PR MOST RECENT SYSTOLIC BLOOD PRESSURE < 130 MM HG: ICD-10-PCS | Mod: CPTII,,,

## 2023-09-18 PROCEDURE — 87088 URINE BACTERIA CULTURE: CPT

## 2023-09-18 PROCEDURE — 3074F SYST BP LT 130 MM HG: CPT | Mod: CPTII,,,

## 2023-09-18 PROCEDURE — 87591 N.GONORRHOEAE DNA AMP PROB: CPT

## 2023-09-18 PROCEDURE — 87491 CHLMYD TRACH DNA AMP PROBE: CPT

## 2023-09-18 PROCEDURE — 3078F PR MOST RECENT DIASTOLIC BLOOD PRESSURE < 80 MM HG: ICD-10-PCS | Mod: CPTII,,,

## 2023-09-18 PROCEDURE — 87086 URINE CULTURE/COLONY COUNT: CPT

## 2023-09-18 PROCEDURE — 99999 PR PBB SHADOW E&M-EST. PATIENT-LVL III: CPT | Mod: PBBFAC,,,

## 2023-09-18 PROCEDURE — 3078F DIAST BP <80 MM HG: CPT | Mod: CPTII,,,

## 2023-09-18 PROCEDURE — 3008F PR BODY MASS INDEX (BMI) DOCUMENTED: ICD-10-PCS | Mod: CPTII,,,

## 2023-09-18 PROCEDURE — 1159F MED LIST DOCD IN RCRD: CPT | Mod: CPTII,,,

## 2023-09-18 PROCEDURE — 99203 OFFICE O/P NEW LOW 30 MIN: CPT | Mod: TH,S$PBB,,

## 2023-09-18 PROCEDURE — 3008F BODY MASS INDEX DOCD: CPT | Mod: CPTII,,,

## 2023-09-18 PROCEDURE — 99213 OFFICE O/P EST LOW 20 MIN: CPT | Mod: PBBFAC,TH

## 2023-09-18 PROCEDURE — 99999 PR PBB SHADOW E&M-EST. PATIENT-LVL III: ICD-10-PCS | Mod: PBBFAC,,,

## 2023-09-18 PROCEDURE — 99203 PR OFFICE/OUTPT VISIT, NEW, LEVL III, 30-44 MIN: ICD-10-PCS | Mod: TH,S$PBB,,

## 2023-09-18 PROCEDURE — 1159F PR MEDICATION LIST DOCUMENTED IN MEDICAL RECORD: ICD-10-PCS | Mod: CPTII,,,

## 2023-09-18 NOTE — PROGRESS NOTES
"Stephy Spaulding is a 27 y.o. , presents today for amenorrhea.      Patient reports has not seen any other provider for this pregnancy    HPI: Reports amenorrhea since Patient's last menstrual period was 2023 (approximate).. Prior to LMP, menses were regular occuring every 30 days.  She is not currently on any contraception.  Denies nausea, vomiting. Has noticed breast tenderness. Denies vaginal bleeding since LMP.    SOCIAL HISTORY: Denies emotional/mental/physical/sexual violence or abuse. Feels safe at home. Accompanied today by daughter. Is with father of babyJennifer. Unplanned pregnancy. 3rd baby for both.   Discussed use of alcohol, tobacco, and illicit substances - pt denies during pregnancy.    PAP HISTORY: last pap , result NILM, denies any history of abnormal pap smear or STDs.     Reports No long-term chronic medical conditions    Review of patient's allergies indicates:  No Known Allergies  Past Medical History:   Diagnosis Date    Asthma     as child only    Birth Center Risk Assessment: 0- Meets birth center guidelines 2020    Birth Center Risk Assessment: 0- Meets birth center guidelines  CNM management in [facility] CNM management on L&D Consultation with OB to develop  plan of care Collaborative CNM/OB management with delivery on L&D Permanent referral of care to MD      Group B Streptococcus urinary tract infection affecting pregnancy in third trimester 2017    Herpes simplex virus (HSV) infection     Has never had a lesion. States she had a "positive blood test" during her first pregnancy,? type    Oligohydramnios in third trimester 3/4/2020    IOL at 41w2d    Pregnancy with one fetus, antepartum 10/2/2019    Prepregnancy BMI: 28 - ABC max 38# Pap: 2019 - NILM Dating - per LMP consistent with 19 wk sonogram Prenatal labs X U/S - WNL  Aneuploidy screening - negative Quad screen (AB POS) GDM screen - Passed 1 hr Vaccines - [ ]Flu [ ]TDAP GBS positive urine and was treated, then " negative swab [x]Consents Contraception - Peds -  Circ -      Supervision of normal pregnancy 3/3/2020    Syncopal episodes     Bradycardia-induced  6 months ago, one other episode as teenager (not dx with same cause)     Past Surgical History:   Procedure Laterality Date    NO PAST SURGERIES       Past Surgical History:   Procedure Laterality Date    NO PAST SURGERIES       OB History    Para Term  AB Living   3 3 3     3   SAB IAB Ectopic Multiple Live Births         0 2      # Outcome Date GA Lbr Dayron/2nd Weight Sex Delivery Anes PTL Lv   3 Term 20 40w3d  3.09 kg (6 lb 13 oz) F Vag-Spont EPI N HENRIK   2 Term 17 39w5d / 00:05 3.827 kg (8 lb 7 oz) F Vag-Spont None N HENRIK   1 Term 16 41w0d  3.033 kg (6 lb 11 oz) F Vag-Spont EPI N      Social History     Socioeconomic History    Marital status: Single   Occupational History     Comment: Bartending at night   Tobacco Use    Smoking status: Former     Types: Cigarettes    Smokeless tobacco: Never   Substance and Sexual Activity    Alcohol use: No     Comment: not with pregnancy    Drug use: No    Sexual activity: Yes     Partners: Male     Birth control/protection: None     Comment: Eric   Social History Narrative    FOB Eric    Daughter [employer]    She occasionally works at [employer] as      Lives with Eric in apartment in [facility]         Social Determinants of Health     Physical Activity: Insufficiently Active (2019)    Exercise Vital Sign     Days of Exercise per Week: 3 days     Minutes of Exercise per Session: 20 min   Stress: Stress Concern Present (2019)    [employer] [place] [employer] - Occupational Stress Questionnaire     Feeling of Stress : To some extent     Family History   Problem Relation Age of Onset    Emphysema Paternal Grandfather     Restless legs syndrome Maternal Grandmother     Restless legs syndrome Mother     Sickle cell trait Sister     Breast cancer Neg Hx     Colon cancer Neg Hx   "   Ovarian cancer Neg Hx      Social History     Substance and Sexual Activity   Sexual Activity Yes    Partners: Male    Birth control/protection: None    Comment: Eric       GENETIC SCREENING   Patient's age 35 years or older as of estimated date of delivery? No  Neural tube defect (meningomyelocele, spina bifida, or anencephaly)? No  Down syndrome? No  Kenny-Sachs (Ashkenazi Shinto, Cajun, South African [place])? No  Canavan disease (Ashkenazi Shinto)? No  Familial dysautonomia (Ashkenazi Shinto)? No  Sickle cell disease or trait ()? Yes - "runs in family"  Hemophilia or other blood disorders? No  Cystic fibrosis? No  Muscular dystrophy? No  Clarendon's chorea? No  Thalassemia (Italian, Greek, Mediterranean, or  background) MCV less than 80? Pt has beta thalassemia minor  Congenital heart defect? No  Mental retardation/autism? No   If Yes, was person tested for Fragile X? No  Other inherited genetic or chromosomal disorder? No  Maternal metabolic disorder (e.g. type 1 diabetes, PKU)? No  Patient or baby's father had a child with birth defects not listed above? No  Recurrent pregnancy loss or a stillbirth: No  Medications (including supplements, vitamins, herbs or OTC drugs)/illicit/recreational drugs/alcohol since last menstrual period? amoxicillan   If yes, agent(s) and strength/dose: unknown  List any other genetic risks: No  Comments/counseling: No    INFECTION HISTORY  Live with someone with TB or exposed to TB: No  Patient or partner has history of genital herpes: No  Rash or viral illness since last menstrual period: No  Patient or partner has hepatitis B or C: No  History of STD, gonorrhea, chlamydia, HPV, HIV, syphilis (list all that apply): No  List other infections: n/a  Additional comments: n/a    Stephanie Li MD     ROS:  Constitutional/Gen: Denies fevers, chills, malaise, or weight loss.  Psych: Denies depression. Reports some anxiety  Eyes: Denies changes in vision or scotomata  Ears, " "nose, mouth, throat: Denies sinus tenderness, swelling, or dentition problems  CV/vasc: Denies heart palpitations or edema  Resp: Denies SOB or dyspnea  Breasts: Denies mass, nipple discharge, or trauma.   GI: Denies constipation or diarrhea.  : Denies vaginal discharge, dysuria or pelvic pain.   MS: Denies weakness, soreness, or changes in ROM    OBJECTIVE:  /66   Ht 5' 9" (1.753 m)   Wt 76.1 kg (167 lb 12.3 oz)   LMP 2023 (Approximate) Comment: unsure  Breastfeeding No   BMI 24.78 kg/m²   Constitutional/Gen: NAD, appears stated age, well groomed  Neck: supple, no masses or enlargement  Head: normocephalic  Skin: warm and dry w/o rash  Lung: normal resp effort, CTAB  Heart: normal HR, RRR   Back: negative CVAT  Breasts: bilaterally--no masses, tenderness, skin changes, or nipple discharge noted  Abdomen: soft, nontender, no masses, and bowel sounds normal, no enlargement  External genitalia: no lesions or discharge, normal hair distribution  Urethral meatus: normal size and location, no lesions or prolapse  Vagina: normal appearance, no lesions, no discharge, no evidence cystocele or rectocele.  Cervix: normal appearance, no discharge, no lesions, negative CMT  Uterus: nontender, mobile, approx 20 week size, smooth contour. + FHTs via doppler  Adnexa: no masses or tenderness  Anus/Perineum: normal appearance, with no lesions or discharge. Internal exam deferred.  Extremities: FROM, with no edema or tenderness.  Neurologic: A&O x 4, non-focal, cranial nerves 2-12 grossly intact  Psych: affect appropriate and without signs of mood, thought or memory difficulty appreciated      UPT positive in office    ASSESSMENT:  27 y.o. female  with amenorrhea  Likely at 16wd via LMP; S>D. FH = 20cm.   Patient Active Problem List   Diagnosis    HSV-2 seropositive    Beta thalassemia minor    HSV (herpes simplex virus) anogenital infection    Iron deficiency anemia    Syncopal episodes    Breast " feeding status of mother    Supervision of normal pregnancy       PLAN:  Amenorrhea  -- + UPT in office, Patient's last menstrual period was 07/24/2023 (approximate). --> Estimated Date of Delivery: None noted.  -- Dating US ordered/scheduled  -- Routine serum and urine prenatal labs today    Physical exam today.   --Discussed ASCCP guidelines. Pap due today/not done. Will do PP.    Pregnancy education and couseling; handouts and booklet provided  -- Oriented to practice and anticipated prenatal course of care and how to contact us  -- Reviewed ABC guidelines and admission, exclusion, and transfer criteria  -- Precautions/warning signs reviewed  -- Common complaints of pregnancy  -- Routine prenatal labs including HIV  -- Ultrasounds  -- Childbirth education/hospital/ABC facilities  -- Nutrition, prepregnant BMI, and recommended weight gain  -- Toxoplasmosis precautions (Cats/Raw Meat)  -- Sexual activity and exercise  -- Environmental/Work hazards  -- Travel  -- Tobacco, as well as alcohol and drug use  -- Use of any medications (Including supplements, Vitamins, Herbs, or OTC Drugs)  -- Domestic violence screen  - Negative    Vaccines:    - Has not received Influenza vaccine for 2022/2023 season   - Reports has not received doses of Covid-19 vaccine. Is due for bivalent booster.    - Education regarding vaccine recommendations in pregnancy; handouts given.    Nausea in pregnancy  -- Education regarding lifestyle and dietary modifications  -- Reviewed use of B6/Unisom prn. Pt will notify us if no relief/worsening symptoms, will consider alternative therapies prn      Reviewed genetic testing options.    -- Reviewed available first trimester and/or second  trimester screening options. Reviewed risk of false positive/negative results and recommendation of referral to [facility] in event of a positive result, for NIPT, US, and/or amniocentesis.  -- Patient desires genetic screening, pending gestational age    Reviewed warning  signs, precautions, and how/when to contact us.     RTC x 4 weeks, call or present sooner prn.     Updated Medication List:  Current Outpatient Medications   Medication Sig Dispense Refill    PNV no.95/ferrous fum/folic ac (PRENATAL ORAL) Take 1 tablet by mouth.       No current facility-administered medications for this visit.         Desirae Castillo CNM/NP

## 2023-09-19 ENCOUNTER — PATIENT MESSAGE (OUTPATIENT)
Dept: MATERNAL FETAL MEDICINE | Facility: CLINIC | Age: 28
End: 2023-09-19
Payer: MEDICAID

## 2023-09-19 LAB
BACTERIA UR CULT: ABNORMAL
C TRACH DNA SPEC QL NAA+PROBE: NOT DETECTED
N GONORRHOEA DNA SPEC QL NAA+PROBE: NOT DETECTED

## 2023-09-23 DIAGNOSIS — R82.71 BACTERIURIA, ASYMPTOMATIC IN PREGNANCY: ICD-10-CM

## 2023-09-23 DIAGNOSIS — O99.891 BACTERIURIA, ASYMPTOMATIC IN PREGNANCY: Primary | ICD-10-CM

## 2023-09-23 DIAGNOSIS — R82.71 BACTERIURIA, ASYMPTOMATIC IN PREGNANCY: Primary | ICD-10-CM

## 2023-09-23 DIAGNOSIS — O99.891 BACTERIURIA, ASYMPTOMATIC IN PREGNANCY: ICD-10-CM

## 2023-09-23 RX ORDER — CEPHALEXIN 500 MG/1
500 CAPSULE ORAL EVERY 6 HOURS
Qty: 20 CAPSULE | Refills: 0 | Status: SHIPPED | OUTPATIENT
Start: 2023-09-23 | End: 2023-09-23 | Stop reason: SDUPTHER

## 2023-09-24 RX ORDER — CEPHALEXIN 500 MG/1
500 CAPSULE ORAL EVERY 6 HOURS
Qty: 20 CAPSULE | Refills: 0 | Status: SHIPPED | OUTPATIENT
Start: 2023-09-24 | End: 2023-09-29

## 2023-09-25 ENCOUNTER — PROCEDURE VISIT (OUTPATIENT)
Dept: MATERNAL FETAL MEDICINE | Facility: CLINIC | Age: 28
End: 2023-09-25
Payer: MEDICAID

## 2023-09-25 DIAGNOSIS — Z32.01 POSITIVE PREGNANCY TEST: ICD-10-CM

## 2023-09-25 PROCEDURE — 76805 OB US >/= 14 WKS SNGL FETUS: CPT | Mod: PBBFAC | Performed by: OBSTETRICS & GYNECOLOGY

## 2023-09-25 PROCEDURE — 76805 US MFM PROCEDURE (VIEWPOINT): ICD-10-PCS | Mod: 26,S$PBB,, | Performed by: OBSTETRICS & GYNECOLOGY

## 2023-10-18 ENCOUNTER — LAB VISIT (OUTPATIENT)
Dept: LAB | Facility: OTHER | Age: 28
End: 2023-10-18
Payer: MEDICAID

## 2023-10-18 ENCOUNTER — INITIAL PRENATAL (OUTPATIENT)
Dept: OBSTETRICS AND GYNECOLOGY | Facility: CLINIC | Age: 28
End: 2023-10-18
Payer: MEDICAID

## 2023-10-18 VITALS
WEIGHT: 177.38 LBS | DIASTOLIC BLOOD PRESSURE: 62 MMHG | SYSTOLIC BLOOD PRESSURE: 112 MMHG | BODY MASS INDEX: 26.19 KG/M2

## 2023-10-18 DIAGNOSIS — Z3A.23 23 WEEKS GESTATION OF PREGNANCY: Primary | ICD-10-CM

## 2023-10-18 DIAGNOSIS — R82.71 BACTERIURIA: ICD-10-CM

## 2023-10-18 DIAGNOSIS — Z32.01 POSITIVE PREGNANCY TEST: ICD-10-CM

## 2023-10-18 LAB
ABO + RH BLD: NORMAL
BASOPHILS # BLD AUTO: 0.03 K/UL (ref 0–0.2)
BASOPHILS NFR BLD: 0.3 % (ref 0–1.9)
BLD GP AB SCN CELLS X3 SERPL QL: NORMAL
DIFFERENTIAL METHOD: ABNORMAL
EOSINOPHIL # BLD AUTO: 0.1 K/UL (ref 0–0.5)
EOSINOPHIL NFR BLD: 1.2 % (ref 0–8)
ERYTHROCYTE [DISTWIDTH] IN BLOOD BY AUTOMATED COUNT: 16.1 % (ref 11.5–14.5)
HBV SURFACE AG SERPL QL IA: NORMAL
HCT VFR BLD AUTO: 33 % (ref 37–48.5)
HCV AB SERPL QL IA: NORMAL
HGB BLD-MCNC: 10.4 G/DL (ref 12–16)
HIV 1+2 AB+HIV1 P24 AG SERPL QL IA: NORMAL
IMM GRANULOCYTES # BLD AUTO: 0.04 K/UL (ref 0–0.04)
IMM GRANULOCYTES NFR BLD AUTO: 0.4 % (ref 0–0.5)
LYMPHOCYTES # BLD AUTO: 1.8 K/UL (ref 1–4.8)
LYMPHOCYTES NFR BLD: 17.4 % (ref 18–48)
MCH RBC QN AUTO: 22.4 PG (ref 27–31)
MCHC RBC AUTO-ENTMCNC: 31.5 G/DL (ref 32–36)
MCV RBC AUTO: 71 FL (ref 82–98)
MONOCYTES # BLD AUTO: 0.4 K/UL (ref 0.3–1)
MONOCYTES NFR BLD: 4.2 % (ref 4–15)
NEUTROPHILS # BLD AUTO: 8 K/UL (ref 1.8–7.7)
NEUTROPHILS NFR BLD: 76.5 % (ref 38–73)
NRBC BLD-RTO: 0 /100 WBC
PLATELET # BLD AUTO: 370 K/UL (ref 150–450)
PMV BLD AUTO: 9.8 FL (ref 9.2–12.9)
RBC # BLD AUTO: 4.65 M/UL (ref 4–5.4)
SPECIMEN OUTDATE: NORMAL
WBC # BLD AUTO: 10.4 K/UL (ref 3.9–12.7)

## 2023-10-18 PROCEDURE — 86593 SYPHILIS TEST NON-TREP QUANT: CPT

## 2023-10-18 PROCEDURE — 85025 COMPLETE CBC W/AUTO DIFF WBC: CPT

## 2023-10-18 PROCEDURE — 87389 HIV-1 AG W/HIV-1&-2 AB AG IA: CPT

## 2023-10-18 PROCEDURE — 99999 PR PBB SHADOW E&M-EST. PATIENT-LVL II: ICD-10-PCS | Mod: PBBFAC,,,

## 2023-10-18 PROCEDURE — 86803 HEPATITIS C AB TEST: CPT

## 2023-10-18 PROCEDURE — 99213 OFFICE O/P EST LOW 20 MIN: CPT | Mod: S$PBB,TH,,

## 2023-10-18 PROCEDURE — 99212 OFFICE O/P EST SF 10 MIN: CPT | Mod: PBBFAC

## 2023-10-18 PROCEDURE — 87088 URINE BACTERIA CULTURE: CPT

## 2023-10-18 PROCEDURE — 87086 URINE CULTURE/COLONY COUNT: CPT

## 2023-10-18 PROCEDURE — 86780 TREPONEMA PALLIDUM: CPT

## 2023-10-18 PROCEDURE — 86901 BLOOD TYPING SEROLOGIC RH(D): CPT

## 2023-10-18 PROCEDURE — 86762 RUBELLA ANTIBODY: CPT

## 2023-10-18 PROCEDURE — 99999 PR PBB SHADOW E&M-EST. PATIENT-LVL II: CPT | Mod: PBBFAC,,,

## 2023-10-18 PROCEDURE — 87340 HEPATITIS B SURFACE AG IA: CPT

## 2023-10-18 PROCEDURE — 86592 SYPHILIS TEST NON-TREP QUAL: CPT

## 2023-10-18 PROCEDURE — 87147 CULTURE TYPE IMMUNOLOGIC: CPT

## 2023-10-18 PROCEDURE — 99213 PR OFFICE/OUTPT VISIT, EST, LEVL III, 20-29 MIN: ICD-10-PCS | Mod: S$PBB,TH,,

## 2023-10-18 PROCEDURE — 36415 COLL VENOUS BLD VENIPUNCTURE: CPT

## 2023-10-18 NOTE — PROGRESS NOTES
Initial Prenatal Visit    27 y.o. female  at 23w3d, by Estimated Date of Delivery: 24     Complaints today: none. Doing well today.   Denies UCs, LOF, or VB. Reports daily FM.    Reviewed TW lbs - discussed weight gain and whether pregravid weight was accurate    PHYSICAL EXAM  /62   Wt 80.4 kg (177 lb 5.8 oz)   LMP 2023 (Approximate) Comment: unsure  BMI 26.19 kg/m²     GENERAL: No acute distress  HEENT: Normocephalic, atraumatic  NEURO: Alert and oriented x3  PSYCH: Normal mood and affect  PULMONARY: Non-labored respiration; no tachypnea  ABD: Soft, gravid, nontender      ASSESSMENT AND PLAN  Bacteriuria  -     CULTURE, URINE       Reviewed upcoming 28wk labs, (AB POS) and orders placed   Has not yet had 1T labs drawn -- patient to go after this visit   Still taking abx for asymptomatic bacteruria, but UC collected due to unusual result.    Education regarding warning signs of PreEclampsia, reviewed normal FM,  labor precautions, and how/when to call.    Follow-up: 4 weeks, call or present sooner PRN    Lulú Donis, TRAVIS, APRN

## 2023-10-19 ENCOUNTER — PATIENT MESSAGE (OUTPATIENT)
Dept: ADMINISTRATIVE | Facility: OTHER | Age: 28
End: 2023-10-19
Payer: MEDICAID

## 2023-10-19 DIAGNOSIS — A53.9 SYPHILIS: Primary | ICD-10-CM

## 2023-10-19 DIAGNOSIS — O98.112 SYPHILIS AFFECTING PREGNANCY IN SECOND TRIMESTER: ICD-10-CM

## 2023-10-19 LAB
RPR SER QL: REACTIVE
RPR SER-TITR: ABNORMAL {TITER}
RUBV IGG SER-ACNC: 66.9 IU/ML
RUBV IGG SER-IMP: REACTIVE

## 2023-10-19 NOTE — PROGRESS NOTES
Spoke with patient regarding positive RPR and RPR titer. Discussed need for treatment of STD for her partner and herself. Discussed using barrier methods until treated and retested. Aware will retest in 4wks. Instructions to receive pcn injection in injection clinic (MA to make appt now). Received information to treat partner, will call in rx to preferred pharmacy.

## 2023-10-20 ENCOUNTER — PATIENT MESSAGE (OUTPATIENT)
Dept: OBSTETRICS AND GYNECOLOGY | Facility: CLINIC | Age: 28
End: 2023-10-20
Payer: MEDICAID

## 2023-10-20 ENCOUNTER — TELEPHONE (OUTPATIENT)
Dept: OBSTETRICS AND GYNECOLOGY | Facility: CLINIC | Age: 28
End: 2023-10-20
Payer: MEDICAID

## 2023-10-20 DIAGNOSIS — O23.42 GBS (GROUP B STREPTOCOCCUS) UTI COMPLICATING PREGNANCY, SECOND TRIMESTER: ICD-10-CM

## 2023-10-20 DIAGNOSIS — B95.1 GBS (GROUP B STREPTOCOCCUS) UTI COMPLICATING PREGNANCY, SECOND TRIMESTER: ICD-10-CM

## 2023-10-20 LAB
BACTERIA UR CULT: ABNORMAL
T PALLIDUM AB SER QL IF: REACTIVE

## 2023-10-20 NOTE — TELEPHONE ENCOUNTER
Spoke with patient regarding getting her partner treated. She provided his name & birthday, but would only provide us with her phone number and address. She stated that she would call back later with insurance information to give to pharmacy. Explained that I am trying to order treatment for him at the [facility] pharmacy and they could call with information.   Will await return phone call.   Expressed importance of both her and her partner getting treated.

## 2023-10-20 NOTE — TELEPHONE ENCOUNTER
Pt called to rescheduled her penicillin injection because she didn't have a ride to come in today. Spoke with injection nurse and moved her appt to Monday October 23, 2023 @ 1:30pm. Pt agreed with date and time.

## 2023-10-22 ENCOUNTER — PATIENT MESSAGE (OUTPATIENT)
Dept: OTHER | Facility: OTHER | Age: 28
End: 2023-10-22
Payer: MEDICAID

## 2023-10-22 ENCOUNTER — PATIENT MESSAGE (OUTPATIENT)
Dept: OBSTETRICS AND GYNECOLOGY | Facility: CLINIC | Age: 28
End: 2023-10-22
Payer: MEDICAID

## 2023-10-22 DIAGNOSIS — O98.112: Primary | ICD-10-CM

## 2023-10-22 DIAGNOSIS — O98.112 SYPHILIS AFFECTING PREGNANCY IN SECOND TRIMESTER: Primary | ICD-10-CM

## 2023-10-23 ENCOUNTER — OFFICE VISIT (OUTPATIENT)
Dept: OBSTETRICS AND GYNECOLOGY | Facility: CLINIC | Age: 28
End: 2023-10-23
Payer: MEDICAID

## 2023-10-23 ENCOUNTER — PATIENT MESSAGE (OUTPATIENT)
Dept: MATERNAL FETAL MEDICINE | Facility: CLINIC | Age: 28
End: 2023-10-23
Payer: MEDICAID

## 2023-10-23 ENCOUNTER — CLINICAL SUPPORT (OUTPATIENT)
Dept: OBSTETRICS AND GYNECOLOGY | Facility: CLINIC | Age: 28
End: 2023-10-23
Payer: MEDICAID

## 2023-10-23 VITALS
DIASTOLIC BLOOD PRESSURE: 76 MMHG | BODY MASS INDEX: 26.61 KG/M2 | WEIGHT: 180.25 LBS | SYSTOLIC BLOOD PRESSURE: 114 MMHG

## 2023-10-23 DIAGNOSIS — O98.112 SYPHILIS AFFECTING PREGNANCY IN SECOND TRIMESTER: Primary | ICD-10-CM

## 2023-10-23 PROCEDURE — 99999 PR PBB SHADOW E&M-EST. PATIENT-LVL II: ICD-10-PCS | Mod: PBBFAC,,, | Performed by: ADVANCED PRACTICE MIDWIFE

## 2023-10-23 PROCEDURE — 99213 OFFICE O/P EST LOW 20 MIN: CPT | Mod: S$PBB,TH,, | Performed by: ADVANCED PRACTICE MIDWIFE

## 2023-10-23 PROCEDURE — 3008F BODY MASS INDEX DOCD: CPT | Mod: CPTII,,, | Performed by: ADVANCED PRACTICE MIDWIFE

## 2023-10-23 PROCEDURE — 3008F PR BODY MASS INDEX (BMI) DOCUMENTED: ICD-10-PCS | Mod: CPTII,,, | Performed by: ADVANCED PRACTICE MIDWIFE

## 2023-10-23 PROCEDURE — 3074F PR MOST RECENT SYSTOLIC BLOOD PRESSURE < 130 MM HG: ICD-10-PCS | Mod: CPTII,,, | Performed by: ADVANCED PRACTICE MIDWIFE

## 2023-10-23 PROCEDURE — 99212 OFFICE O/P EST SF 10 MIN: CPT | Mod: 25,PBBFAC,TH | Performed by: ADVANCED PRACTICE MIDWIFE

## 2023-10-23 PROCEDURE — 3078F PR MOST RECENT DIASTOLIC BLOOD PRESSURE < 80 MM HG: ICD-10-PCS | Mod: CPTII,,, | Performed by: ADVANCED PRACTICE MIDWIFE

## 2023-10-23 PROCEDURE — 99999 PR PBB SHADOW E&M-EST. PATIENT-LVL I: ICD-10-PCS | Mod: PBBFAC,,,

## 2023-10-23 PROCEDURE — 99999 PR PBB SHADOW E&M-EST. PATIENT-LVL I: CPT | Mod: PBBFAC,,,

## 2023-10-23 PROCEDURE — 96372 THER/PROPH/DIAG INJ SC/IM: CPT | Mod: PBBFAC

## 2023-10-23 PROCEDURE — 99999PBSHW PR PBB SHADOW TECHNICAL ONLY FILED TO HB: Mod: JZ,PBBFAC,,

## 2023-10-23 PROCEDURE — 3078F DIAST BP <80 MM HG: CPT | Mod: CPTII,,, | Performed by: ADVANCED PRACTICE MIDWIFE

## 2023-10-23 PROCEDURE — 99999 PR PBB SHADOW E&M-EST. PATIENT-LVL II: CPT | Mod: PBBFAC,,, | Performed by: ADVANCED PRACTICE MIDWIFE

## 2023-10-23 PROCEDURE — 3074F SYST BP LT 130 MM HG: CPT | Mod: CPTII,,, | Performed by: ADVANCED PRACTICE MIDWIFE

## 2023-10-23 PROCEDURE — 99213 PR OFFICE/OUTPT VISIT, EST, LEVL III, 20-29 MIN: ICD-10-PCS | Mod: S$PBB,TH,, | Performed by: ADVANCED PRACTICE MIDWIFE

## 2023-10-23 PROCEDURE — 99999PBSHW PR PBB SHADOW TECHNICAL ONLY FILED TO HB: ICD-10-PCS | Mod: JZ,PBBFAC,,

## 2023-10-23 RX ORDER — CEPHALEXIN 500 MG/1
500 CAPSULE ORAL EVERY 6 HOURS
Status: ON HOLD | COMMUNITY
End: 2024-01-27 | Stop reason: HOSPADM

## 2023-10-23 RX ADMIN — PENICILLIN G BENZATHINE 2.4 MILLION UNITS: 2400000 INJECTION, SUSPENSION INTRAMUSCULAR at 01:10

## 2023-10-23 NOTE — PROGRESS NOTES
Follow-up    27 y.o. female  at 24w1d, by Estimated Date of Delivery: 24    Complaints today: concerned for diagnosis of syphilis and potential for risk. Reports that partner is being evaluated and treated at [facility] department today.   Denies UCs, LOF, or VB. Reports daily FM.      PHYSICAL EXAM  /76   Wt 81.8 kg (180 lb 3.6 oz)   LMP 2023 (Approximate) Comment: unsure  BMI 26.61 kg/m²     GENERAL: No acute distress  HEENT: Normocephalic, atraumatic  NEURO: Alert and oriented x3  PSYCH: Normal mood and affect  PULMONARY: Non-labored respiration; no tachypnea  ABD: Soft, gravid, nontender  FHT: 145  FH: 24      ASSESSMENT AND PLAN  Syphilis affecting pregnancy in second trimester      For first of three injections today; reviewed importance of adherence and delay intercourse with partner until after both have completed treatment course  Follow-up: keep scheduled injection and AYO appointments    Bailey Raza, TRAVIS, APRN

## 2023-10-23 NOTE — PROGRESS NOTES
Patient here for 1 of 3 bicillin injection. No pain noted, injection given. Patient tolerated well advised to wait in lobby 5 minutes and report any adverse reactions.       Site: RB

## 2023-10-30 ENCOUNTER — TELEPHONE (OUTPATIENT)
Dept: OBSTETRICS AND GYNECOLOGY | Facility: CLINIC | Age: 28
End: 2023-10-30
Payer: MEDICAID

## 2023-10-30 ENCOUNTER — CLINICAL SUPPORT (OUTPATIENT)
Dept: OBSTETRICS AND GYNECOLOGY | Facility: CLINIC | Age: 28
End: 2023-10-30
Payer: MEDICAID

## 2023-10-30 DIAGNOSIS — O98.112 SYPHILIS AFFECTING PREGNANCY IN SECOND TRIMESTER: Primary | ICD-10-CM

## 2023-10-30 PROCEDURE — 99999 PR PBB SHADOW E&M-EST. PATIENT-LVL I: CPT | Mod: PBBFAC,,,

## 2023-10-30 PROCEDURE — 99999PBSHW PR PBB SHADOW TECHNICAL ONLY FILED TO HB: ICD-10-PCS | Mod: JZ,PBBFAC,,

## 2023-10-30 PROCEDURE — 99999 PR PBB SHADOW E&M-EST. PATIENT-LVL I: ICD-10-PCS | Mod: PBBFAC,,,

## 2023-10-30 PROCEDURE — 99999PBSHW PR PBB SHADOW TECHNICAL ONLY FILED TO HB: Mod: JZ,PBBFAC,,

## 2023-10-30 PROCEDURE — 96372 THER/PROPH/DIAG INJ SC/IM: CPT | Mod: PBBFAC

## 2023-10-30 RX ADMIN — PENICILLIN G BENZATHINE 2.4 MILLION UNITS: 2400000 INJECTION, SUSPENSION INTRAMUSCULAR at 02:10

## 2023-10-30 NOTE — PROGRESS NOTES
Patient here for bicillin 2 of 3  injection. No pain noted, injection given. Patient tolerated well advised to wait in lobby 5 minutes and report any adverse reactions.       Site: LB

## 2023-11-05 ENCOUNTER — PATIENT MESSAGE (OUTPATIENT)
Dept: OTHER | Facility: OTHER | Age: 28
End: 2023-11-05
Payer: MEDICAID

## 2023-11-06 ENCOUNTER — OFFICE VISIT (OUTPATIENT)
Dept: MATERNAL FETAL MEDICINE | Facility: CLINIC | Age: 28
End: 2023-11-06
Payer: MEDICAID

## 2023-11-06 ENCOUNTER — CLINICAL SUPPORT (OUTPATIENT)
Dept: OBSTETRICS AND GYNECOLOGY | Facility: CLINIC | Age: 28
End: 2023-11-06
Payer: MEDICAID

## 2023-11-06 DIAGNOSIS — O98.112 SYPHILIS AFFECTING PREGNANCY IN SECOND TRIMESTER: ICD-10-CM

## 2023-11-06 DIAGNOSIS — O98.112 SYPHILIS AFFECTING PREGNANCY IN SECOND TRIMESTER: Primary | ICD-10-CM

## 2023-11-06 PROCEDURE — 99214 OFFICE O/P EST MOD 30 MIN: CPT | Mod: TH,95,, | Performed by: OBSTETRICS & GYNECOLOGY

## 2023-11-06 PROCEDURE — 99214 PR OFFICE/OUTPT VISIT, EST, LEVL IV, 30-39 MIN: ICD-10-PCS | Mod: TH,95,, | Performed by: OBSTETRICS & GYNECOLOGY

## 2023-11-06 PROCEDURE — 99999 PR PBB SHADOW E&M-EST. PATIENT-LVL I: CPT | Mod: PBBFAC,,,

## 2023-11-06 PROCEDURE — 96372 THER/PROPH/DIAG INJ SC/IM: CPT | Mod: PBBFAC

## 2023-11-06 PROCEDURE — 99999PBSHW PR PBB SHADOW TECHNICAL ONLY FILED TO HB: ICD-10-PCS | Mod: JZ,PBBFAC,,

## 2023-11-06 PROCEDURE — 99999PBSHW PR PBB SHADOW TECHNICAL ONLY FILED TO HB: Mod: JZ,PBBFAC,,

## 2023-11-06 PROCEDURE — 99999 PR PBB SHADOW E&M-EST. PATIENT-LVL I: ICD-10-PCS | Mod: PBBFAC,,,

## 2023-11-06 RX ADMIN — PENICILLIN G BENZATHINE 2.4 MILLION UNITS: 2400000 INJECTION, SUSPENSION INTRAMUSCULAR at 01:11

## 2023-11-06 NOTE — ASSESSMENT & PLAN NOTE
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  The first or primary stage of syphilis presents as a single painless lesion most commonly in the genital region. In women, this lesion is most commonly on the cervix or in the vagina and often times goes unrecognized. Even without treatment, the lesion usually resolves within 3-6 weeks.     The subsequent stage is known as the secondary stage of syphilis. In this stage, widespread bacteremia may occur resulting in involvement of any organ in the body. It commonly manifests with highly contagious skin and mucous membrane lesions such as a generalized rash that often includes the palms and soles as well as genital lesions(condyloma latum) and generalized lymphadenopathy. The lesions of secondary syphilis can also resolve without treatment in about 2-6 weeks.  Subsequently, patients enter the latent phase of syphilis where the disease remains dormant. This stage can continue for years. Patients tend to have no clinical symptoms however the disease can still be transmitted to the fetus.  Without treatment, one third of patients progress to tertiary syphilis. This can result in necrotic lesions( gummas) in multiple organ systems, most notably the nervous system(neurosyphilis). Patients with tertiary syphilis can often suffer from cardiovascular disease as well.     Fetal Risks:  Treponema Pallidum can cross the placenta to the fetus as early as the 6th gestational week. Congenital syphilis is associated with adverse fetal and  outcomes including increased risk of stillbirth, hydrops,  polyhydramnios,  labor.  manifestations and childhood manifestations(onset at less than 2 years) include however are not limited to generalized rash that can become vesicular and worsen, hepatosplenomegaly,jaundice, pseudoparalysis, and a flu like syndrome(snuffles). If untreated, congenital syphilis can progress to late congenital syphilis with worsening symptoms.  Treatment significantly decreases these risks to the fetus/.  The risk of transmission is directly related to the severity of the disease in the mother. Women with primary or secondary syphilis have about a 50% chance of having a fetus affected by congenital syphilis. With latent syphilis, the risk is between 10-40%.  There is also a risk of transmission intrapartum if genital lesions are present.    Diagnosis and Management  Patient's diagnosis was confirmed with + RPR and FTA-antibodies.  Unknown timing of diagnosis as patient denies knowledge of genital lesions or skin rash etc  Syphilis of unknown duration is treated with 3 weekly doses of IV Penicillin G 2.4 million units, which the patient has received. Last dose today  Patients treated with penicillin may suffer from a Jarisch-Herxheimer reaction during treatment and up to 36 hours post treatment. This often results in flu like symptoms as well as hypotension and transient worsening of skin lesions. Most common complaints are fever, uterine contractions, and decreased fetal movement. for which supportive care is recommended.   The appropriate interval to follow serial RPR titers after treatment in pregnancy remains somewhat controversial. Given that the patient received treatment at 24,25 and 26 weeks I would recommend she receive repeat RPR titers in 8 weeks at 34 weeks to confirm titers are not increasing (which would signal potential reinfection or less likely treatment failure). Patient states her partner is also receiving treatment.   If primary or secondary syphilis, a  fourfold decline is expected by 6-12 months. If late syphilis, a fourfold decline is expected within 24 months. The majority of pregnant women will not achieve this fourfold decrease in titers prior to delivery, so the absence of fourfold decrease does not indicate treatment failure. An increase in titer after treatment that is sustained for >2 weeks is concerning for reinfection or treatment failure.     Recommendations:  1. Basic anatomic survey this pregnancy was normal, recommend primary OB refer patient for detailed anatomic survey for targeted views and repeat growth  2. Repeat growth between 32-34 weeks is recommended  3. RPR titers as outlined above, recommend performing repeat titers at 34 weeks  4. Send placenta to pathology at the time of delivery  5. Notify pediatrician at the time of delivery  6. Primary OB should perform full assessment for other STIs

## 2023-11-06 NOTE — PROGRESS NOTES
The patient location is: at clinic in private room  The chief complaint leading to consultation is: syphilis in pregnancy    Visit type: audiovisual    Face to Face time with patient: 9 minutes  15 minutes of total time spent on the encounter, which includes face to face time and non-face to face time preparing to see the patient (eg, review of tests), Obtaining and/or reviewing separately obtained history, Documenting clinical information in the electronic or other health record, Independently interpreting results (not separately reported) and communicating results to the patient/family/caregiver, or Care coordination (not separately reported).       Each patient to whom he or she provides medical services by telemedicine is:  (1) informed of the relationship between the physician and patient and the respective role of any other health care provider with respect to management of the patient; and (2) notified that he or she may decline to receive medical services by telemedicine and may withdraw from such care at any time.    Notes:   Maternal Fetal Medicine follow up consult    SUBJECTIVE:     Stephy Spaulding is a 27 y.o.  female with IUP at 26w1d who is seen in follow up consultation by [facility].  Pregnancy complications include:   Problem   Syphilis Affecting Pregnancy in Second Trimester       Previous notes reviewed.   No changes to medical, surgical, family, social, or obstetric history.    Interval history since last [facility] visit: doing well today, no acute complaints. Endorses recent diagnosis of syphilis, denies prior knowledge of infection. Denies prior genital chancre, diffuse maculopapular rash, or any other signs of systemic infection. Partner is also being treated. Received last dose of bicillin today.     Medications:  Current Outpatient Medications   Medication Instructions    cephALEXin (KEFLEX) 500 mg, Oral, Every 6 hours    PNV no.95/ferrous fum/folic ac (PRENATAL ORAL) 1 tablet, Oral       Care team  members:  Lulú Donis CNM - Primary OB       OBJECTIVE:   LMP 2023 (Approximate) Comment: unsure        ASSESSMENT/PLAN:     27 y.o.  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  The first or primary stage of syphilis presents as a single painless lesion most commonly in the genital region. In women, this lesion is most commonly on the cervix or in the vagina and often times goes unrecognized. Even without treatment, the lesion usually resolves within 3-6 weeks.     The subsequent stage is known as the secondary stage of syphilis. In this stage, widespread bacteremia may occur resulting in involvement of any organ in the body. It commonly manifests with highly contagious skin and mucous membrane lesions such as a generalized rash that often includes the palms and soles as well as genital lesions(condyloma latum) and generalized lymphadenopathy. The lesions of secondary syphilis can also resolve without treatment in about 2-6 weeks.  Subsequently, patients enter the latent phase of syphilis where the disease remains dormant. This stage can continue for years. Patients tend to have no clinical symptoms however the disease can still be transmitted to the fetus.  Without treatment, one third of patients progress to tertiary syphilis. This can result in necrotic lesions( gummas) in multiple organ systems, most notably the nervous system(neurosyphilis). Patients with tertiary syphilis can often suffer from cardiovascular disease as well.     Fetal  Risks:  Treponema Pallidum can cross the placenta to the fetus as early as the 6th gestational week. Congenital syphilis is associated with adverse fetal and  outcomes including increased risk of stillbirth, hydrops, polyhydramnios,  labor.  manifestations and childhood manifestations(onset at less than 2 years) include however are not limited to generalized rash that can become vesicular and worsen, hepatosplenomegaly,jaundice, pseudoparalysis, and a flu like syndrome(snuffles). If untreated, congenital syphilis can progress to late congenital syphilis with worsening symptoms.  Treatment significantly decreases these risks to the fetus/.  The risk of transmission is directly related to the severity of the disease in the mother. Women with primary or secondary syphilis have about a 50% chance of having a fetus affected by congenital syphilis. With latent syphilis, the risk is between 10-40%.  There is also a risk of transmission intrapartum if genital lesions are present.    Diagnosis and Management  Patient's diagnosis was confirmed with + RPR and FTA-antibodies.  Unknown timing of diagnosis as patient denies knowledge of genital lesions or skin rash etc  Syphilis of unknown duration is treated with 3 weekly doses of IV Penicillin G 2.4 million units, which the patient has received. Last dose today  Patients treated with penicillin may suffer from a Jarisch-Herxheimer reaction during treatment and up to 36 hours post treatment. This often results in flu like symptoms as well as hypotension and transient worsening of skin lesions. Most common complaints are fever, uterine contractions, and decreased fetal movement. for which supportive care is recommended.   The appropriate interval to follow serial RPR titers after treatment in pregnancy remains somewhat controversial. Given that the patient received treatment at 24,25 and 26 weeks I would recommend she receive repeat RPR titers in 8  weeks at 34 weeks to confirm titers are not increasing (which would signal potential reinfection or less likely treatment failure). Patient states her partner is also receiving treatment.   If primary or secondary syphilis, a fourfold decline is expected by 6-12 months. If late syphilis, a fourfold decline is expected within 24 months. The majority of pregnant women will not achieve this fourfold decrease in titers prior to delivery, so the absence of fourfold decrease does not indicate treatment failure. An increase in titer after treatment that is sustained for >2 weeks is concerning for reinfection or treatment failure.     Recommendations:  1. Basic anatomic survey this pregnancy was normal, recommend primary OB refer patient for detailed anatomic survey for targeted views and repeat growth  2. Repeat growth between 32-34 weeks is recommended  3. RPR titers as outlined above, recommend performing repeat titers at 34 weeks  4. Send placenta to pathology at the time of delivery  5. Notify pediatrician at the time of delivery  6. Primary OB should perform full assessment for other STIs         No follow up has been scheduled  All future follow up ultrasounds should be read by MFM due to diagnosis of syphilis    Abrahan Loving MD  PGY 7  Maternal Fetal Medicine  [facility]

## 2023-11-06 NOTE — PROGRESS NOTES
Patient here for 3 of 3 bicillin injection. No pain noted, injection given. Patient tolerated well advised to wait in lobby 5 minutes and report any adverse reactions.       Site: rb

## 2023-11-10 ENCOUNTER — PATIENT MESSAGE (OUTPATIENT)
Dept: MATERNAL FETAL MEDICINE | Facility: CLINIC | Age: 28
End: 2023-11-10
Payer: MEDICAID

## 2023-11-10 ENCOUNTER — TELEPHONE (OUTPATIENT)
Dept: MATERNAL FETAL MEDICINE | Facility: CLINIC | Age: 28
End: 2023-11-10
Payer: MEDICAID

## 2023-11-10 DIAGNOSIS — O98.112 SYPHILIS AFFECTING PREGNANCY IN SECOND TRIMESTER: Primary | ICD-10-CM

## 2023-11-10 NOTE — TELEPHONE ENCOUNTER
Tried calling patient back and could not leave a voicemail.    ----- Message from Jose Alejandro Campbell MA sent at 11/10/2023 11:33 AM CST -----  Patient called regards to a missed call.    Patient contact:[phone]

## 2023-11-14 ENCOUNTER — PROCEDURE VISIT (OUTPATIENT)
Dept: MATERNAL FETAL MEDICINE | Facility: CLINIC | Age: 28
End: 2023-11-14
Payer: MEDICAID

## 2023-11-14 ENCOUNTER — ROUTINE PRENATAL (OUTPATIENT)
Dept: OBSTETRICS AND GYNECOLOGY | Facility: CLINIC | Age: 28
End: 2023-11-14
Payer: MEDICAID

## 2023-11-14 ENCOUNTER — PATIENT MESSAGE (OUTPATIENT)
Dept: MATERNAL FETAL MEDICINE | Facility: CLINIC | Age: 28
End: 2023-11-14

## 2023-11-14 VITALS
BODY MASS INDEX: 27.41 KG/M2 | WEIGHT: 185.63 LBS | SYSTOLIC BLOOD PRESSURE: 112 MMHG | DIASTOLIC BLOOD PRESSURE: 62 MMHG

## 2023-11-14 DIAGNOSIS — O98.112 SYPHILIS AFFECTING PREGNANCY IN SECOND TRIMESTER: ICD-10-CM

## 2023-11-14 DIAGNOSIS — O98.112 SYPHILIS AFFECTING PREGNANCY IN SECOND TRIMESTER: Primary | ICD-10-CM

## 2023-11-14 DIAGNOSIS — D50.9 IRON DEFICIENCY ANEMIA, UNSPECIFIED IRON DEFICIENCY ANEMIA TYPE: ICD-10-CM

## 2023-11-14 DIAGNOSIS — Z3A.27 27 WEEKS GESTATION OF PREGNANCY: Primary | ICD-10-CM

## 2023-11-14 DIAGNOSIS — O23.42 GBS (GROUP B STREPTOCOCCUS) UTI COMPLICATING PREGNANCY, SECOND TRIMESTER: ICD-10-CM

## 2023-11-14 DIAGNOSIS — B95.1 GBS (GROUP B STREPTOCOCCUS) UTI COMPLICATING PREGNANCY, SECOND TRIMESTER: ICD-10-CM

## 2023-11-14 DIAGNOSIS — Z36.4 ULTRASOUND FOR ANTENATAL SCREENING FOR FETAL GROWTH RESTRICTION: ICD-10-CM

## 2023-11-14 PROCEDURE — 76816 OB US FOLLOW-UP PER FETUS: CPT | Mod: PBBFAC | Performed by: OBSTETRICS & GYNECOLOGY

## 2023-11-14 PROCEDURE — 99999 PR PBB SHADOW E&M-EST. PATIENT-LVL II: ICD-10-PCS | Mod: PBBFAC,,,

## 2023-11-14 PROCEDURE — 99212 OFFICE O/P EST SF 10 MIN: CPT | Mod: PBBFAC,TH

## 2023-11-14 PROCEDURE — 99214 OFFICE O/P EST MOD 30 MIN: CPT | Mod: TH,S$PBB,,

## 2023-11-14 PROCEDURE — 76816 OB US FOLLOW-UP PER FETUS: CPT | Mod: 26,S$PBB,, | Performed by: OBSTETRICS & GYNECOLOGY

## 2023-11-14 PROCEDURE — 76816 PR  US,PREGNANT UTERUS,F/U,TRANSABD APP: ICD-10-PCS | Mod: 26,S$PBB,, | Performed by: OBSTETRICS & GYNECOLOGY

## 2023-11-14 PROCEDURE — 99999 PR PBB SHADOW E&M-EST. PATIENT-LVL II: CPT | Mod: PBBFAC,,,

## 2023-11-14 PROCEDURE — 99214 PR OFFICE/OUTPT VISIT, EST, LEVL IV, 30-39 MIN: ICD-10-PCS | Mod: TH,S$PBB,,

## 2023-11-14 NOTE — PROGRESS NOTES
27 y.o. female  at 27w2d by Estimated Date of Delivery: 24    Complaints today: none  Denies UCs, LOF, VB. Reports active fetal movement.    Taking iron with orange juice    Reviewed TW lb    PHYSICAL EXAM  /62   Wt 84.2 kg (185 lb 10 oz)   LMP 2023 (Approximate) Comment: unsure  BMI 27.41 kg/m²     GENERAL: No acute distress  HEENT: Normocephalic, atraumatic  NEURO: Alert and oriented x3  PULMONARY: Non-labored respiration; no tachypnea  ABD: Soft, gravid, nontender.    ASSESSMENT AND PLAN  Syphilis affecting pregnancy in second trimester  -     US MFM Procedure ([facility])-Future; Future    Iron deficiency anemia, unspecified iron deficiency anemia type  -     CBC W/ AUTO DIFFERENTIAL; Future; Expected date: 2023    GBS (group b Streptococcus) UTI complicating pregnancy, second trimester      Completing 28wk labs when she can come to lab. Discussed  Blood type: (AB POS).   Education regarding CDC recommendations for Tdap in pregnancy. Patient declines. Order placed, will schedule with next appt.  Discussed GBS UTI  Changed pre-gravid weight, patient stated updated weight closer to pregnancy, changed in epic  CBC with OGTT, ordered  Discussed ABC criteria    Reviewed warning signs, normal FKCs,  labor precautions and how/when to call.    Follow-up: 2-3 weeks, call or present sooner PRN.    Usha Reynoso CNM, APRN

## 2023-11-21 ENCOUNTER — HOSPITAL ENCOUNTER (EMERGENCY)
Facility: OTHER | Age: 28
Discharge: HOME | End: 2023-11-22
Attending: OBSTETRICS & GYNECOLOGY
Payer: MEDICAID

## 2023-11-21 DIAGNOSIS — R11.2 NAUSEA AND VOMITING, UNSPECIFIED VOMITING TYPE: Primary | ICD-10-CM

## 2023-11-21 DIAGNOSIS — G89.29 CHRONIC PELVIC PAIN IN FEMALE: ICD-10-CM

## 2023-11-21 DIAGNOSIS — Z3A.28 28 WEEKS GESTATION OF PREGNANCY: ICD-10-CM

## 2023-11-21 DIAGNOSIS — R19.7 DIARRHEA, UNSPECIFIED TYPE: ICD-10-CM

## 2023-11-21 DIAGNOSIS — R10.2 CHRONIC PELVIC PAIN IN FEMALE: ICD-10-CM

## 2023-11-21 DIAGNOSIS — R10.30 LOWER ABDOMINAL PAIN: ICD-10-CM

## 2023-11-21 LAB
ALBUMIN SERPL BCP-MCNC: 2.9 G/DL (ref 3.5–5.2)
ALP SERPL-CCNC: 57 U/L (ref 55–135)
ALT SERPL W/O P-5'-P-CCNC: 12 U/L (ref 10–44)
ANION GAP SERPL CALC-SCNC: 12 MMOL/L (ref 8–16)
AST SERPL-CCNC: 23 U/L (ref 10–40)
BASOPHILS # BLD AUTO: 0.01 K/UL (ref 0–0.2)
BASOPHILS NFR BLD: 0.1 % (ref 0–1.9)
BILIRUB SERPL-MCNC: 0.2 MG/DL (ref 0.1–1)
BILIRUB SERPL-MCNC: NEGATIVE MG/DL
BLOOD URINE, POC: NEGATIVE
BUN SERPL-MCNC: 4 MG/DL (ref 6–20)
CALCIUM SERPL-MCNC: 8.4 MG/DL (ref 8.7–10.5)
CHLORIDE SERPL-SCNC: 107 MMOL/L (ref 95–110)
CO2 SERPL-SCNC: 17 MMOL/L (ref 23–29)
COLOR, POC UA: YELLOW
CREAT SERPL-MCNC: 0.7 MG/DL (ref 0.5–1.4)
DIFFERENTIAL METHOD: ABNORMAL
EOSINOPHIL # BLD AUTO: 0 K/UL (ref 0–0.5)
EOSINOPHIL NFR BLD: 0 % (ref 0–8)
ERYTHROCYTE [DISTWIDTH] IN BLOOD BY AUTOMATED COUNT: 16.1 % (ref 11.5–14.5)
EST. GFR  (NO RACE VARIABLE): >60 ML/MIN/1.73 M^2
GLUCOSE SERPL-MCNC: 83 MG/DL (ref 70–110)
GLUCOSE UR QL STRIP: NORMAL
HCT VFR BLD AUTO: 28.7 % (ref 37–48.5)
HGB BLD-MCNC: 9.3 G/DL (ref 12–16)
IMM GRANULOCYTES # BLD AUTO: 0.02 K/UL (ref 0–0.04)
IMM GRANULOCYTES NFR BLD AUTO: 0.3 % (ref 0–0.5)
KETONES UR QL STRIP: NORMAL
LEUKOCYTE ESTERASE URINE, POC: NEGATIVE
LYMPHOCYTES # BLD AUTO: 0.7 K/UL (ref 1–4.8)
LYMPHOCYTES NFR BLD: 8.9 % (ref 18–48)
MCH RBC QN AUTO: 22.5 PG (ref 27–31)
MCHC RBC AUTO-ENTMCNC: 32.4 G/DL (ref 32–36)
MCV RBC AUTO: 70 FL (ref 82–98)
MONOCYTES # BLD AUTO: 0.6 K/UL (ref 0.3–1)
MONOCYTES NFR BLD: 7.3 % (ref 4–15)
NEUTROPHILS # BLD AUTO: 6.4 K/UL (ref 1.8–7.7)
NEUTROPHILS NFR BLD: 83.4 % (ref 38–73)
NITRITE, POC UA: NEGATIVE
NRBC BLD-RTO: 0 /100 WBC
PH, POC UA: 8
PLATELET # BLD AUTO: 274 K/UL (ref 150–450)
PMV BLD AUTO: 9.1 FL (ref 9.2–12.9)
POTASSIUM SERPL-SCNC: 3.3 MMOL/L (ref 3.5–5.1)
PROT SERPL-MCNC: 6.7 G/DL (ref 6–8.4)
PROTEIN, POC: NEGATIVE
RBC # BLD AUTO: 4.13 M/UL (ref 4–5.4)
SODIUM SERPL-SCNC: 136 MMOL/L (ref 136–145)
SPECIFIC GRAVITY, POC UA: 1
UROBILINOGEN, POC UA: NORMAL
WBC # BLD AUTO: 7.63 K/UL (ref 3.9–12.7)

## 2023-11-21 PROCEDURE — 63600175 PHARM REV CODE 636 W HCPCS

## 2023-11-21 PROCEDURE — 81002 URINALYSIS NONAUTO W/O SCOPE: CPT

## 2023-11-21 PROCEDURE — 99285 EMERGENCY DEPT VISIT HI MDM: CPT | Mod: 25

## 2023-11-21 PROCEDURE — 59025 FETAL NON-STRESS TEST: CPT

## 2023-11-21 PROCEDURE — 96374 THER/PROPH/DIAG INJ IV PUSH: CPT

## 2023-11-21 PROCEDURE — 80053 COMPREHEN METABOLIC PANEL: CPT | Mod: 91

## 2023-11-21 PROCEDURE — 96361 HYDRATE IV INFUSION ADD-ON: CPT | Mod: 59

## 2023-11-21 PROCEDURE — 96375 TX/PRO/DX INJ NEW DRUG ADDON: CPT

## 2023-11-21 PROCEDURE — 25000003 PHARM REV CODE 250

## 2023-11-21 PROCEDURE — 87491 CHLMYD TRACH DNA AMP PROBE: CPT | Mod: 59

## 2023-11-21 PROCEDURE — 85025 COMPLETE CBC W/AUTO DIFF WBC: CPT | Mod: 91

## 2023-11-21 RX ORDER — HYDROMORPHONE HYDROCHLORIDE 1 MG/ML
1 INJECTION, SOLUTION INTRAMUSCULAR; INTRAVENOUS; SUBCUTANEOUS ONCE
Status: DISCONTINUED | OUTPATIENT
Start: 2023-11-21 | End: 2023-11-21

## 2023-11-21 RX ORDER — ONDANSETRON 2 MG/ML
4 INJECTION INTRAMUSCULAR; INTRAVENOUS ONCE
Status: COMPLETED | OUTPATIENT
Start: 2023-11-21 | End: 2023-11-21

## 2023-11-21 RX ORDER — PROMETHAZINE HYDROCHLORIDE 12.5 MG/1
12.5 TABLET ORAL ONCE
Status: COMPLETED | OUTPATIENT
Start: 2023-11-21 | End: 2023-11-21

## 2023-11-21 RX ADMIN — ONDANSETRON 4 MG: 2 INJECTION INTRAMUSCULAR; INTRAVENOUS at 09:11

## 2023-11-21 RX ADMIN — SODIUM CHLORIDE, POTASSIUM CHLORIDE, SODIUM LACTATE AND CALCIUM CHLORIDE 1000 ML: 600; 310; 30; 20 INJECTION, SOLUTION INTRAVENOUS at 10:11

## 2023-11-21 RX ADMIN — PROMETHAZINE HYDROCHLORIDE 12.5 MG: 12.5 TABLET ORAL at 11:11

## 2023-11-22 VITALS
RESPIRATION RATE: 18 BRPM | OXYGEN SATURATION: 99 % | SYSTOLIC BLOOD PRESSURE: 100 MMHG | TEMPERATURE: 98 F | DIASTOLIC BLOOD PRESSURE: 56 MMHG | HEART RATE: 74 BPM

## 2023-11-22 LAB
BILIRUB SERPL-MCNC: NORMAL MG/DL
BILIRUB SERPL-MCNC: NORMAL MG/DL
BLOOD URINE, POC: NORMAL
BLOOD URINE, POC: NORMAL
COLOR, POC UA: NORMAL
COLOR, POC UA: NORMAL
GLUCOSE UR QL STRIP: 250
GLUCOSE UR QL STRIP: 250
KETONES UR QL STRIP: NORMAL
KETONES UR QL STRIP: NORMAL
LEUKOCYTE ESTERASE URINE, POC: NORMAL
LEUKOCYTE ESTERASE URINE, POC: NORMAL
NITRITE, POC UA: NORMAL
NITRITE, POC UA: NORMAL
PH, POC UA: 7
PH, POC UA: 7
PROTEIN, POC: NORMAL
PROTEIN, POC: NORMAL
SPECIFIC GRAVITY, POC UA: 1
SPECIFIC GRAVITY, POC UA: 1
UROBILINOGEN, POC UA: NORMAL
UROBILINOGEN, POC UA: NORMAL

## 2023-11-22 PROCEDURE — 63600175 PHARM REV CODE 636 W HCPCS

## 2023-11-22 PROCEDURE — 59025 PR FETAL 2N-STRESS TEST: ICD-10-PCS | Mod: 26,,, | Performed by: OBSTETRICS & GYNECOLOGY

## 2023-11-22 PROCEDURE — 99284 EMERGENCY DEPT VISIT MOD MDM: CPT | Mod: 25,,, | Performed by: OBSTETRICS & GYNECOLOGY

## 2023-11-22 PROCEDURE — 63600175 PHARM REV CODE 636 W HCPCS: Performed by: GENERAL PRACTICE

## 2023-11-22 PROCEDURE — 96361 HYDRATE IV INFUSION ADD-ON: CPT

## 2023-11-22 PROCEDURE — 81002 URINALYSIS NONAUTO W/O SCOPE: CPT

## 2023-11-22 PROCEDURE — 25000003 PHARM REV CODE 250: Performed by: GENERAL PRACTICE

## 2023-11-22 PROCEDURE — 99284 PR EMERGENCY DEPT VISIT,LEVEL IV: ICD-10-PCS | Mod: 25,,, | Performed by: OBSTETRICS & GYNECOLOGY

## 2023-11-22 PROCEDURE — 59025 FETAL NON-STRESS TEST: CPT | Mod: 26,,, | Performed by: OBSTETRICS & GYNECOLOGY

## 2023-11-22 RX ORDER — HYDROMORPHONE HYDROCHLORIDE 1 MG/ML
1 INJECTION, SOLUTION INTRAMUSCULAR; INTRAVENOUS; SUBCUTANEOUS ONCE
Status: COMPLETED | OUTPATIENT
Start: 2023-11-22 | End: 2023-11-22

## 2023-11-22 RX ORDER — SODIUM CHLORIDE AND POTASSIUM CHLORIDE 300; 900 MG/100ML; MG/100ML
INJECTION, SOLUTION INTRAVENOUS CONTINUOUS
Status: DISCONTINUED | OUTPATIENT
Start: 2023-11-22 | End: 2023-11-22

## 2023-11-22 RX ORDER — POTASSIUM CHLORIDE 20 MEQ/1
40 TABLET, EXTENDED RELEASE ORAL ONCE
Status: COMPLETED | OUTPATIENT
Start: 2023-11-22 | End: 2023-11-22

## 2023-11-22 RX ADMIN — HYDROMORPHONE HYDROCHLORIDE 1 MG: 0.5 INJECTION, SOLUTION INTRAMUSCULAR; INTRAVENOUS; SUBCUTANEOUS at 12:11

## 2023-11-22 RX ADMIN — SODIUM CHLORIDE, SODIUM LACTATE, POTASSIUM CHLORIDE, CALCIUM CHLORIDE AND DEXTROSE MONOHYDRATE 1000 ML: 5; 600; 310; 30; 20 INJECTION, SOLUTION INTRAVENOUS at 12:11

## 2023-11-22 RX ADMIN — POTASSIUM CHLORIDE 40 MEQ: 1500 TABLET, EXTENDED RELEASE ORAL at 01:11

## 2023-11-22 RX ADMIN — SODIUM CHLORIDE, SODIUM LACTATE, POTASSIUM CHLORIDE, CALCIUM CHLORIDE AND DEXTROSE MONOHYDRATE 500 ML: 5; 600; 310; 30; 20 INJECTION, SOLUTION INTRAVENOUS at 02:11

## 2023-11-22 NOTE — DISCHARGE INSTRUCTIONS
Stay hydrated by drinking 2-3 liters of water per day. Call or come to the OB Emergency Department for signs of labor such as painful contractions, leaking of fluid, or bleeding. You may take Tylenol (2 regular strength or 1 extra strength) for discomforts of pregnancy such as mild cramping, soreness, and back pain. If you have a history of elevated blood pressures, call us for increased blood pressure readings at home, a headache that persists after taking Tylenol, blurred vision, shortness of breath, or sharp upper abdominal pain. Monitor your baby's movements. If you are concerned that your baby's movements are decreased, call the [facility] for further evaluation.     OB Emergency Department: [phone]

## 2023-11-22 NOTE — ED PROVIDER NOTES
"Encounter Date: 2023       History     Chief Complaint   Patient presents with    Abdominal Pain     Left side     HPI    Stephy Spaulding is a 27 y.o. [id number] at 28w2d presents complaining of sudden onset left sided abdominal pain and back pain, she also has nausea and 1 episode of emesis in [facility]. She denies any Fevers or chills, no dysuria, denies constipation (last BM today), denies diarrhea. She received tylenol at other institution with minimal relief.     Patient denies contractions, denies vaginal bleeding, denies LOF. Fetal Movement: normal.   This IUP is complicated by syphilis that was treated.    Temp 98.5    Review of patient's allergies indicates:  No Known Allergies  Past Medical History:   Diagnosis Date    Asthma     as child only    Birth Center Risk Assessment: 0- Meets birth center guidelines 2020    Birth Center Risk Assessment: 0- Meets birth center guidelines  CNM management in ABC CNM management on L&D Consultation with OB to develop  plan of care Collaborative CNM/OB management with delivery on L&D Permanent referral of care to MD      Group B Streptococcus urinary tract infection affecting pregnancy in third trimester 2017    Herpes simplex virus (HSV) infection     Has never had a lesion. States she had a "positive blood test" during her first pregnancy,? type    Oligohydramnios in third trimester 3/4/2020    IOL at 41w2d    Pregnancy with one fetus, antepartum 10/2/2019    Prepregnancy BMI: 28 - ABC max 38# Pap: 2019 - NILM Dating - per LMP consistent with 19 wk sonogram Prenatal labs X U/S - WNL  Aneuploidy screening - negative Quad screen (AB POS) GDM screen - Passed 1 hr Vaccines - [ ]Flu [ ]TDAP GBS positive urine and was treated, then negative swab [x]Consents Contraception - Peds -  Circ -      Supervision of normal pregnancy 3/3/2020    Syncopal episodes     Bradycardia-induced  6 months ago, one other episode as teenager (not dx with same cause)     Past Surgical " History:   Procedure Laterality Date    NO PAST SURGERIES       Family History   Problem Relation Age of Onset    Emphysema Paternal Grandfather     Restless legs syndrome Maternal Grandmother     Restless legs syndrome Mother     Sickle cell trait Sister     Breast cancer Neg Hx     Colon cancer Neg Hx     Ovarian cancer Neg Hx      Social History     Tobacco Use    Smoking status: Former     Types: Cigarettes    Smokeless tobacco: Never   Substance Use Topics    Alcohol use: No     Comment: not with pregnancy    Drug use: No     Review of Systems   Constitutional:  Negative for chills, diaphoresis and fever.   HENT:  Negative for congestion.    Respiratory:  Negative for shortness of breath.    Cardiovascular:  Negative for chest pain and leg swelling.   Gastrointestinal:  Positive for abdominal pain, nausea and vomiting. Negative for abdominal distention, constipation and diarrhea.   Genitourinary:  Negative for dysuria, vaginal bleeding and vaginal discharge.   Musculoskeletal:  Negative for back pain.   Skin:  Negative for rash.   Neurological:  Negative for light-headedness and headaches.   Psychiatric/Behavioral:  Negative for confusion.        Physical Exam     Initial Vitals [11/21/23 2109]   BP Pulse Resp Temp SpO2   125/68 73 18 98.3 °F (36.8 °C) 99 %      MAP       --         Physical Exam    Constitutional: She appears well-developed and well-nourished.   HENT:   Head: Normocephalic.   Eyes: EOM are normal.   Cardiovascular:  Normal rate.           Pulmonary/Chest: Breath sounds normal. No respiratory distress.   Abdominal: Abdomen is soft. She exhibits no distension and no mass. There is no abdominal tenderness. There is guarding (LLQ). There is no rebound.   Genitourinary:    No vaginal discharge.     Musculoskeletal:         General: No tenderness.     Neurological: She is alert and oriented to person, place, and time.   Skin: Skin is warm and dry.   Psychiatric: She has a normal mood and affect.  Thought content normal.     OB LABOR EXAM:       Method: Sterile vaginal exam per MD.   Vaginal Bleeding: none present.     Dilatation: 0.       Amniotic Fluid Color: no fluid.           ED Course   Fetal non-stress test    Date/Time: 11/21/2023 9:49 PM    Performed by: Grzegorz Cardoso MD  Authorized by: Grzegorz Cardoso MD    Nonstress Test:     Variability:  6-25 BPM    Decelerations:  None    Accelerations:  15 bpm    Acoustic Stimulator: No      Baseline:  135    Uterine Irritability: No      Contractions:  Not present  Biophysical Profile:     Nonstress Test Interpretation: reactive      Overall Impression:  Reassuring  Post-procedure:     Patient tolerance:  Patient tolerated the procedure well with no immediate complications    Labs Reviewed   CBC W/ AUTO DIFFERENTIAL - Abnormal; Notable for the following components:       Result Value    Hemoglobin 9.3 (*)     Hematocrit 28.7 (*)     MCV 70 (*)     MCH 22.5 (*)     RDW 16.1 (*)     MPV 9.1 (*)     Lymph # 0.7 (*)     Gran % 83.4 (*)     Lymph % 8.9 (*)     All other components within normal limits   COMPREHENSIVE METABOLIC PANEL - Abnormal; Notable for the following components:    Potassium 3.3 (*)     CO2 17 (*)     BUN 4 (*)     Calcium 8.4 (*)     Albumin 2.9 (*)     All other components within normal limits   C. TRACHOMATIS/N. GONORRHOEAE BY AMP DNA   POCT URINALYSIS, DIPSTICK OR TABLET REAGENT, AUTOMATED, WITH MICROSCOP   POCT URINALYSIS, DIPSTICK OR TABLET REAGENT, AUTOMATED, WITH MICROSCOP   POCT URINALYSIS, DIPSTICK OR TABLET REAGENT, AUTOMATED, WITH MICROSCOP          Imaging Results              US Retroperitoneal Complete (Final result)  Result time 11/21/23 23:48:56   Procedure changed from US Abdomen Pelvis Doppler Study Limited (retroperitoneal)     Final result by Tanisha Shields MD (11/21/23 23:48:56)                   Impression:      Moderate right hydronephrosis.      Electronically signed by: Tanisha  Cornelius  Date:    11/21/2023  Time:    23:48               Narrative:    EXAMINATION:  ULTRASOUND RETROPERITONEAL COMPLETE    CLINICAL HISTORY:  Pelvic and perineal painconcern for kidney stones;    TECHNIQUE:  Real-time ultrasound of the retroperitoneum was performed, including the urinary bladder and kidneys.    COMPARISON:  None.    FINDINGS:  Right kidney measures 12.2 cm.  The resistive index is 0.69..  There is dilatation of the right renal pelvis and calices.    Left kidney measures 12.7 cm.  The resistive index is 0.61.    Visualization of the urinary bladder is limited secondary to pregnancy.                                       Medications   lactated ringers bolus 1,000 mL (0 mLs Intravenous Stopped 11/21/23 2350)   ondansetron injection 4 mg (4 mg Intravenous Given 11/21/23 2152)   dextrose 5% lactated ringers bolus 1,000 mL (0 mLs Intravenous Stopped 11/22/23 0138)   promethazine tablet 12.5 mg (12.5 mg Oral Given 11/21/23 2349)   HYDROmorphone injection 1 mg (1 mg Intravenous Given 11/22/23 0012)   potassium chloride SA CR tablet 40 mEq (40 mEq Oral Given 11/22/23 0149)   dextrose 5% lactated ringers bolus 500 mL (0 mLs Intravenous Stopped 11/22/23 0255)     Medical Decision Making  Transfer from [facility] for abdominal pain, N/V, diarrhea, 1 Episode emesis  - Urine dip +3 ketones  - S/p 2L bolus LR and 1L D5LR > +1 ketones  - Renal US to rule out kidney stone > showed hydronephrosis  - Phenergan and dilaudid relieved her symptoms  - CBC normal, CMP significant for K 3.3, replaced in [facility]  - SVE closed, rechecked after 2 hours and stable. Not concerned for labor.  - NST reactive reassuring, toco shows no contractions.          Amount and/or Complexity of Data Reviewed  Labs: ordered. Decision-making details documented in ED Course.  Radiology: ordered.    Risk  Prescription drug management.              Attending Attestation:   Physician Attestation Statement for Resident:  As the supervising MD    Physician Attestation Statement: I have personally seen and examined this patient.   I agree with the above history.  -:   As the supervising MD I agree with the above PE.     As the supervising MD I agree with the above treatment, course, plan, and disposition.   -:   NST  I independently reviewed the fetal non-stress test with the following interpretation:  135 BPM baseline  Variability: moderate  Accelerations: present  Decelerations: absent  Contractions: none  Category 1    Clinical Interpretation:age appropriate    Patient evaluated and found to be stable, agree with resident's assessment and plan.  Pt writhing in pain but ruled out for PTL with mutliple closed cervical checks and no contractions on tocometer.  Cat 1 tracing.  Pain on left side consistent with possible nephrolithiasis but blood on u/a and normal retroperitoneal sono.  Pain would spontaneously resolve but at its worst was a 7/10 at is peak and improved with 1mg dilaudid and phenergan.  Ketones improved for 3L IVF.  Pain precautions reviewed.    I was personally present during the critical portions of the procedure(s) performed by the resident and was immediately available in the ED to provide services and assistance as needed during the entire procedure.  I have reviewed and agree with the residents interpretation of the following: lab data.  I have reviewed the following: old records at this facility.                ED Course as of 11/22/23 0626   Tue Nov 21, 2023   2200 CBC auto differential(!) [AN]   2211 CBC auto differential(!) [AN]   Wed Nov 22, 2023   0102 Potassium(!): 3.3 [AN]      ED Course User Index  [AN] Grzegorz Cardoso MD              Clinical Impression:  Final diagnoses:  [R10.2, G89.29] Chronic pelvic pain in female  [R10.2, G89.29] Chronic pelvic pain in female - pelvic pain, back pain          ED Disposition Condition    Discharge Stable          ED Prescriptions    None       Follow-up Information    None           Grzegorz Cardoso MD  Resident  11/22/23 0632       Marilyn Callahan MD  11/22/23 0659

## 2023-11-24 LAB
C TRACH DNA SPEC QL NAA+PROBE: NOT DETECTED
N GONORRHOEA DNA SPEC QL NAA+PROBE: NOT DETECTED

## 2023-12-03 ENCOUNTER — PATIENT MESSAGE (OUTPATIENT)
Dept: OTHER | Facility: OTHER | Age: 28
End: 2023-12-03
Payer: MEDICAID

## 2023-12-13 ENCOUNTER — ROUTINE PRENATAL (OUTPATIENT)
Dept: OBSTETRICS AND GYNECOLOGY | Facility: CLINIC | Age: 28
End: 2023-12-13
Payer: MEDICAID

## 2023-12-13 VITALS
WEIGHT: 187.63 LBS | BODY MASS INDEX: 27.71 KG/M2 | SYSTOLIC BLOOD PRESSURE: 126 MMHG | DIASTOLIC BLOOD PRESSURE: 63 MMHG

## 2023-12-13 DIAGNOSIS — Z34.83 ENCOUNTER FOR SUPERVISION OF OTHER NORMAL PREGNANCY IN THIRD TRIMESTER: Primary | ICD-10-CM

## 2023-12-13 PROCEDURE — 99213 PR OFFICE/OUTPT VISIT, EST, LEVL III, 20-29 MIN: ICD-10-PCS | Mod: S$PBB,TH,, | Performed by: OBSTETRICS & GYNECOLOGY

## 2023-12-13 PROCEDURE — 99999 PR PBB SHADOW E&M-EST. PATIENT-LVL II: CPT | Mod: PBBFAC,,,

## 2023-12-13 PROCEDURE — 99212 OFFICE O/P EST SF 10 MIN: CPT | Mod: PBBFAC,TH

## 2023-12-13 PROCEDURE — 99999 PR PBB SHADOW E&M-EST. PATIENT-LVL II: ICD-10-PCS | Mod: PBBFAC,,,

## 2023-12-13 PROCEDURE — 99213 OFFICE O/P EST LOW 20 MIN: CPT | Mod: S$PBB,TH,, | Performed by: OBSTETRICS & GYNECOLOGY

## 2023-12-13 NOTE — PROGRESS NOTES
28 y.o. female  at 31w3d, by Estimated Date of Delivery: 24    Reports + FM, denies VB, LOF or regular CTX  Doing well with no  concerns. Here with daughter Chelsi  28 week labs ordered  TW lbs     PHYSICAL EXAM  /63   Wt 85.1 kg (187 lb 9.8 oz)   LMP 2023 (Approximate) Comment: unsure  BMI 27.71 kg/m²   GENERAL: No acute distress  HEENT: Normocephalic, atraumatic  NEURO: Alert and oriented x3  PULMONARY: Non-labored respiration; no tachypnea  ABD: Soft, gravid, nontender.      ASSESSMENT AND PLAN  Encounter for supervision of other normal pregnancy in third trimester        Patient  plan to breast feed - reviewed breast feeding class here. Patient does  request Breast Pump Rx today.  It's a Boy! They do not  desire circumcision.  [facility]ediatrician[facility]    Reviewed warning signs, normal FM,  labor precautions, and how/when to call.  Confirmed pt has after-hours number.  Follow-up: 2 weeks, call or present sooner PRN    Joyce Lees CNM/MS

## 2023-12-17 ENCOUNTER — PATIENT MESSAGE (OUTPATIENT)
Dept: OTHER | Facility: OTHER | Age: 28
End: 2023-12-17
Payer: MEDICAID

## 2023-12-19 ENCOUNTER — PROCEDURE VISIT (OUTPATIENT)
Dept: MATERNAL FETAL MEDICINE | Facility: CLINIC | Age: 28
End: 2023-12-19
Payer: MEDICAID

## 2023-12-19 ENCOUNTER — LAB VISIT (OUTPATIENT)
Dept: LAB | Facility: OTHER | Age: 28
End: 2023-12-19
Attending: ADVANCED PRACTICE MIDWIFE
Payer: MEDICAID

## 2023-12-19 DIAGNOSIS — O98.112 SYPHILIS AFFECTING PREGNANCY IN SECOND TRIMESTER: ICD-10-CM

## 2023-12-19 DIAGNOSIS — Z34.83 ENCOUNTER FOR SUPERVISION OF OTHER NORMAL PREGNANCY IN THIRD TRIMESTER: ICD-10-CM

## 2023-12-19 LAB — HIV 1+2 AB+HIV1 P24 AG SERPL QL IA: NORMAL

## 2023-12-19 PROCEDURE — 86780 TREPONEMA PALLIDUM: CPT | Performed by: ADVANCED PRACTICE MIDWIFE

## 2023-12-19 PROCEDURE — 76816 US MFM PROCEDURE (VIEWPOINT): ICD-10-PCS | Mod: 26,S$PBB,, | Performed by: OBSTETRICS & GYNECOLOGY

## 2023-12-19 PROCEDURE — 36415 COLL VENOUS BLD VENIPUNCTURE: CPT | Performed by: ADVANCED PRACTICE MIDWIFE

## 2023-12-19 PROCEDURE — 86593 SYPHILIS TEST NON-TREP QUANT: CPT | Performed by: ADVANCED PRACTICE MIDWIFE

## 2023-12-19 PROCEDURE — 87389 HIV-1 AG W/HIV-1&-2 AB AG IA: CPT | Performed by: ADVANCED PRACTICE MIDWIFE

## 2023-12-19 PROCEDURE — 86592 SYPHILIS TEST NON-TREP QUAL: CPT | Performed by: ADVANCED PRACTICE MIDWIFE

## 2023-12-19 PROCEDURE — 76816 OB US FOLLOW-UP PER FETUS: CPT | Mod: PBBFAC | Performed by: OBSTETRICS & GYNECOLOGY

## 2023-12-20 LAB
RPR SER QL: REACTIVE
RPR SER-TITR: ABNORMAL {TITER}

## 2023-12-22 ENCOUNTER — TELEPHONE (OUTPATIENT)
Dept: OBSTETRICS AND GYNECOLOGY | Facility: CLINIC | Age: 28
End: 2023-12-22
Payer: MEDICAID

## 2023-12-22 DIAGNOSIS — O98.113 SYPHILIS AFFECTING PREGNANCY IN THIRD TRIMESTER: Primary | ICD-10-CM

## 2023-12-22 LAB — T PALLIDUM AB SER QL IF: REACTIVE

## 2023-12-22 NOTE — TELEPHONE ENCOUNTER
Called patient to discuss RPR titer and recommendation by Dr. Hill for a referral to infectious disease. Patient will follow up if she does not hear from them by next week.

## 2023-12-24 ENCOUNTER — PATIENT MESSAGE (OUTPATIENT)
Dept: OBSTETRICS AND GYNECOLOGY | Facility: CLINIC | Age: 28
End: 2023-12-24
Payer: MEDICAID

## 2023-12-27 DIAGNOSIS — O98.113 SYPHILIS AFFECTING PREGNANCY IN THIRD TRIMESTER: Primary | ICD-10-CM

## 2023-12-28 ENCOUNTER — TELEPHONE (OUTPATIENT)
Dept: OBSTETRICS AND GYNECOLOGY | Facility: CLINIC | Age: 28
End: 2023-12-28
Payer: MEDICAID

## 2023-12-28 NOTE — TELEPHONE ENCOUNTER
I called pt and scheduled pcn injection for tomorrow December 29,2023 @ 2;00pm. Pt agreed with date and time.

## 2023-12-29 ENCOUNTER — TELEPHONE (OUTPATIENT)
Dept: OBSTETRICS AND GYNECOLOGY | Facility: CLINIC | Age: 28
End: 2023-12-29
Payer: MEDICAID

## 2023-12-29 ENCOUNTER — DOCUMENTATION ONLY (OUTPATIENT)
Dept: OBSTETRICS AND GYNECOLOGY | Facility: CLINIC | Age: 28
End: 2023-12-29
Payer: MEDICAID

## 2023-12-29 ENCOUNTER — PATIENT MESSAGE (OUTPATIENT)
Dept: OBSTETRICS AND GYNECOLOGY | Facility: CLINIC | Age: 28
End: 2023-12-29
Payer: MEDICAID

## 2023-12-29 DIAGNOSIS — O98.112 SYPHILIS AFFECTING PREGNANCY IN SECOND TRIMESTER: Primary | ICD-10-CM

## 2023-12-29 NOTE — PROGRESS NOTES
Spoke with Dr Conner, ID, states that her titers have not changed fourfold so cannot say that her treatment failed yet. Recommend retest in 3-4 weeks. States that at that time, if she has been found to fail treatment, then will retreat.

## 2023-12-29 NOTE — TELEPHONE ENCOUNTER
Spoke with patient and told her about conversation with infection disease. Confirmed that she has appt with them 1/17 and reinfoced importance of keeping appointment.   Understands needs retest in 3 weeks.   Will order.

## 2024-01-03 ENCOUNTER — LAB VISIT (OUTPATIENT)
Dept: LAB | Facility: OTHER | Age: 29
End: 2024-01-03
Payer: MEDICAID

## 2024-01-03 ENCOUNTER — ROUTINE PRENATAL (OUTPATIENT)
Dept: OBSTETRICS AND GYNECOLOGY | Facility: CLINIC | Age: 29
End: 2024-01-03
Payer: MEDICAID

## 2024-01-03 VITALS
WEIGHT: 190.38 LBS | SYSTOLIC BLOOD PRESSURE: 134 MMHG | HEART RATE: 86 BPM | DIASTOLIC BLOOD PRESSURE: 71 MMHG | BODY MASS INDEX: 28.11 KG/M2

## 2024-01-03 DIAGNOSIS — O98.112 SYPHILIS AFFECTING PREGNANCY IN SECOND TRIMESTER: ICD-10-CM

## 2024-01-03 DIAGNOSIS — Z3A.34 34 WEEKS GESTATION OF PREGNANCY: ICD-10-CM

## 2024-01-03 DIAGNOSIS — B00.9 HERPES SIMPLEX VIRUS TYPE 2 (HSV-2) INFECTION AFFECTING PREGNANCY IN THIRD TRIMESTER: ICD-10-CM

## 2024-01-03 DIAGNOSIS — O98.513 HERPES SIMPLEX VIRUS TYPE 2 (HSV-2) INFECTION AFFECTING PREGNANCY IN THIRD TRIMESTER: ICD-10-CM

## 2024-01-03 DIAGNOSIS — O98.112 SYPHILIS AFFECTING PREGNANCY IN SECOND TRIMESTER: Primary | ICD-10-CM

## 2024-01-03 PROCEDURE — 36415 COLL VENOUS BLD VENIPUNCTURE: CPT

## 2024-01-03 PROCEDURE — 99212 OFFICE O/P EST SF 10 MIN: CPT | Mod: PBBFAC,TH | Performed by: ADVANCED PRACTICE MIDWIFE

## 2024-01-03 PROCEDURE — 86592 SYPHILIS TEST NON-TREP QUAL: CPT

## 2024-01-03 PROCEDURE — 99213 OFFICE O/P EST LOW 20 MIN: CPT | Mod: TH,S$PBB,, | Performed by: ADVANCED PRACTICE MIDWIFE

## 2024-01-03 PROCEDURE — 99999 PR PBB SHADOW E&M-EST. PATIENT-LVL II: CPT | Mod: PBBFAC,,, | Performed by: ADVANCED PRACTICE MIDWIFE

## 2024-01-03 PROCEDURE — 86780 TREPONEMA PALLIDUM: CPT

## 2024-01-03 PROCEDURE — 86593 SYPHILIS TEST NON-TREP QUANT: CPT

## 2024-01-04 ENCOUNTER — PATIENT MESSAGE (OUTPATIENT)
Dept: OBSTETRICS AND GYNECOLOGY | Facility: CLINIC | Age: 29
End: 2024-01-04
Payer: MEDICAID

## 2024-01-04 LAB
RPR SER QL: REACTIVE
RPR SER-TITR: ABNORMAL {TITER}

## 2024-01-05 ENCOUNTER — TELEPHONE (OUTPATIENT)
Dept: OBSTETRICS AND GYNECOLOGY | Facility: CLINIC | Age: 29
End: 2024-01-05
Payer: MEDICAID

## 2024-01-05 DIAGNOSIS — O98.113 SYPHILIS AFFECTING PREGNANCY IN THIRD TRIMESTER: Primary | ICD-10-CM

## 2024-01-05 LAB — T PALLIDUM AB SER QL IF: REACTIVE

## 2024-01-05 NOTE — TELEPHONE ENCOUNTER
Called patient to discuss Dr. Hill's recommendation to defer to ID for treatment plan. CARLOS MANUEL Donis CNM sent message to ID MD and awaiting response.

## 2024-01-05 NOTE — TELEPHONE ENCOUNTER
Called patient to discuss ID recommendation to retreat based on fourfold increase in RPR titers. Injection clinic notified and they are working on getting the doses of bicillin for patient so that she can receive the first of 3 doses. Will notify patient when they have the medication so that she can make appointment. ID appointment scheduled for 1/17.

## 2024-01-06 PROBLEM — B00.9 HERPES SIMPLEX VIRUS TYPE 2 (HSV-2) INFECTION AFFECTING PREGNANCY IN THIRD TRIMESTER: Status: ACTIVE | Noted: 2024-01-06

## 2024-01-06 PROBLEM — O98.513 HERPES SIMPLEX VIRUS TYPE 2 (HSV-2) INFECTION AFFECTING PREGNANCY IN THIRD TRIMESTER: Status: ACTIVE | Noted: 2024-01-06

## 2024-01-06 RX ORDER — ACYCLOVIR 400 MG/1
400 TABLET ORAL 3 TIMES DAILY
Qty: 90 TABLET | Refills: 11 | Status: ON HOLD | OUTPATIENT
Start: 2024-01-06 | End: 2024-01-27 | Stop reason: HOSPADM

## 2024-01-06 NOTE — PROGRESS NOTES
28 y.o.,  at 34w6d, presents for routine OB appt.  Denies LOF, VB, or UCs. Reports good FM.  Doing well today, without new concerns.   TW lbs     PHYSICAL EXAM  GENERAL: No acute distress  HEENT: Normocephalic, atraumatic  NEURO: Alert and oriented x3  PULMONARY: Non-labored respiration; no tachypnea  ABD: Soft, gravid, nontender.    ASSESSMENT AND PLAN  Syphilis affecting pregnancy in second trimester    34 weeks gestation of pregnancy      Reviewed that re-testing prior to re-treatment is recommended by ID   Reviewed upcoming 36wk labs .   Reviewed patient does  have a history of genital HSV - will initiate prophylaxis at 36 weeks  Encouraged po iron    Reviewed ABC risk assessment with the patient:      Reviewed warning signs, normal FM,  labor precautions, and how/when to call.  Confirmed pt has after-hours number.    Follow-up: 2 weeks, call or present sooner PRN    Bailey Raza, TRAVIS, APRN

## 2024-01-07 ENCOUNTER — PATIENT MESSAGE (OUTPATIENT)
Dept: OTHER | Facility: OTHER | Age: 29
End: 2024-01-07
Payer: MEDICAID

## 2024-01-10 ENCOUNTER — TELEPHONE (OUTPATIENT)
Dept: OBSTETRICS AND GYNECOLOGY | Facility: HOSPITAL | Age: 29
End: 2024-01-10
Payer: MEDICAID

## 2024-01-10 NOTE — TELEPHONE ENCOUNTER
Spoke to patient regarding syphilis treatment. Bicillin now available at [facility], [place] injection clinic. Patient stated that she already went through someone with her insurance to set up an appointment elsewhere, and has appointment scheduled for tomorrow. Patient plans to seek treatment there, and stated that she will receive 3 weekly doses of bicillin at that clinic. Discussed that she should bring her records from these visits to her next visit with us and her upcoming visit with infectious disease so that we can add treatments to her records and ensure adequate treatment. Patient verbalized understanding.

## 2024-01-17 ENCOUNTER — ROUTINE PRENATAL (OUTPATIENT)
Dept: OBSTETRICS AND GYNECOLOGY | Facility: CLINIC | Age: 29
End: 2024-01-17
Payer: MEDICAID

## 2024-01-17 VITALS
BODY MASS INDEX: 28.75 KG/M2 | DIASTOLIC BLOOD PRESSURE: 56 MMHG | WEIGHT: 194.69 LBS | SYSTOLIC BLOOD PRESSURE: 114 MMHG

## 2024-01-17 DIAGNOSIS — B95.1 GROUP B STREPTOCOCCUS URINARY TRACT INFECTION AFFECTING PREGNANCY IN THIRD TRIMESTER: ICD-10-CM

## 2024-01-17 DIAGNOSIS — Z3A.36 36 WEEKS GESTATION OF PREGNANCY: ICD-10-CM

## 2024-01-17 DIAGNOSIS — D50.9 IRON DEFICIENCY ANEMIA, UNSPECIFIED IRON DEFICIENCY ANEMIA TYPE: ICD-10-CM

## 2024-01-17 DIAGNOSIS — O98.113 SYPHILIS AFFECTING PREGNANCY IN THIRD TRIMESTER: ICD-10-CM

## 2024-01-17 DIAGNOSIS — O23.43 GROUP B STREPTOCOCCUS URINARY TRACT INFECTION AFFECTING PREGNANCY IN THIRD TRIMESTER: ICD-10-CM

## 2024-01-17 DIAGNOSIS — A60.9 HSV (HERPES SIMPLEX VIRUS) ANOGENITAL INFECTION: Primary | ICD-10-CM

## 2024-01-17 PROCEDURE — 99999 PR PBB SHADOW E&M-EST. PATIENT-LVL III: CPT | Mod: PBBFAC,,,

## 2024-01-17 PROCEDURE — 99213 OFFICE O/P EST LOW 20 MIN: CPT | Mod: PBBFAC,TH

## 2024-01-17 PROCEDURE — 99214 OFFICE O/P EST MOD 30 MIN: CPT | Mod: S$PBB,TH,,

## 2024-01-17 NOTE — PROGRESS NOTES
28 y.o.,  at 36w3d here today for routine ob appt.    Complaints today: none.  Doing well without concerns. Denies LOF, VB, and cramping or regular contractions. Denies dysuria and any other s/s UTI. Denies HA, visual disturbances, and upper abdominal pain. Reports good fetal movement.    Received one injection of bicillin at the [facility]. Wants to schedule subsequent injections here. Will coordinate with injection clinic.    Verified no history of genital HSV -- has history of HSV via serum, has never had an outbreak. Prophylaxis not indicated based on guidelines.  TW lbs     PHYSICAL EXAM  GENERAL: No acute distress  HEENT: Normocephalic, atraumatic  NEURO: Alert and oriented x3  PULMONARY: Non-labored respiration; no tachypnea  ABD: Soft, gravid, nontender. Vertex per Leopold's.    ASSESSMENT AND PLAN  HSV (herpes simplex virus) anogenital infection    Iron deficiency anemia, unspecified iron deficiency anemia type    Group B Streptococcus urinary tract infection affecting pregnancy in third trimester    Syphilis affecting pregnancy in third trimester  -     US MFM Procedure ([facility])-Future; Future    36 weeks gestation of pregnancy  -     CBC W/ AUTO DIFFERENTIAL; Future; Expected date: 2024  -      MFM Procedure ([facility])-Future; Future      GBS pos in urine. Reviewed Allergies: Patient has no known allergies.  Reviewed [place] department of Health recommendation for repeat HIV/RPR today; patient will get Friday  Patient has never completed Glucose screen due to transportation issues. Will get Friday. Reordered.   Plan to get subsequent bicillin injections here at [facility].  Size < Dates - schedule growth    Reviewed warning signs, normal FM,  labor precautions, and how/when to call.      Follow-up: 1 week, call or present sooner PRN    Lulú Donis, TRAVIS/JOHN

## 2024-01-24 ENCOUNTER — HOSPITAL ENCOUNTER (INPATIENT)
Facility: OTHER | Age: 29
LOS: 3 days | Discharge: HOME | End: 2024-01-27
Attending: OBSTETRICS & GYNECOLOGY | Admitting: OBSTETRICS & GYNECOLOGY
Payer: MEDICAID

## 2024-01-24 ENCOUNTER — ANESTHESIA EVENT (OUTPATIENT)
Dept: OBSTETRICS AND GYNECOLOGY | Facility: OTHER | Age: 29
End: 2024-01-24
Payer: MEDICAID

## 2024-01-24 ENCOUNTER — OFFICE VISIT (OUTPATIENT)
Dept: MATERNAL FETAL MEDICINE | Facility: CLINIC | Age: 29
End: 2024-01-24
Attending: OBSTETRICS & GYNECOLOGY
Payer: MEDICAID

## 2024-01-24 ENCOUNTER — ROUTINE PRENATAL (OUTPATIENT)
Dept: OBSTETRICS AND GYNECOLOGY | Facility: CLINIC | Age: 29
End: 2024-01-24
Payer: MEDICAID

## 2024-01-24 ENCOUNTER — ANESTHESIA (OUTPATIENT)
Dept: OBSTETRICS AND GYNECOLOGY | Facility: OTHER | Age: 29
End: 2024-01-24
Payer: MEDICAID

## 2024-01-24 ENCOUNTER — PROCEDURE VISIT (OUTPATIENT)
Dept: MATERNAL FETAL MEDICINE | Facility: CLINIC | Age: 29
End: 2024-01-24
Payer: MEDICAID

## 2024-01-24 VITALS — WEIGHT: 194.25 LBS | BODY MASS INDEX: 28.68 KG/M2

## 2024-01-24 DIAGNOSIS — O36.5990 FETAL GROWTH RESTRICTION ANTEPARTUM: Primary | ICD-10-CM

## 2024-01-24 DIAGNOSIS — O98.113 SYPHILIS AFFECTING PREGNANCY IN THIRD TRIMESTER: ICD-10-CM

## 2024-01-24 DIAGNOSIS — Z36.4 ULTRASOUND FOR ANTENATAL SCREENING FOR FETAL GROWTH RESTRICTION: ICD-10-CM

## 2024-01-24 DIAGNOSIS — O98.113 SYPHILIS AFFECTING PREGNANCY IN THIRD TRIMESTER: Primary | ICD-10-CM

## 2024-01-24 DIAGNOSIS — O98.119 SYPHILIS IN PREGNANCY, ANTEPARTUM: ICD-10-CM

## 2024-01-24 DIAGNOSIS — Z34.90 ENCOUNTER FOR PLANNED INDUCTION OF LABOR: ICD-10-CM

## 2024-01-24 DIAGNOSIS — A60.9 HSV (HERPES SIMPLEX VIRUS) ANOGENITAL INFECTION: Primary | ICD-10-CM

## 2024-01-24 DIAGNOSIS — D56.3 BETA THALASSEMIA MINOR: ICD-10-CM

## 2024-01-24 DIAGNOSIS — Z3A.36 36 WEEKS GESTATION OF PREGNANCY: ICD-10-CM

## 2024-01-24 DIAGNOSIS — O36.5990 FETAL GROWTH RESTRICTION ANTEPARTUM: ICD-10-CM

## 2024-01-24 DIAGNOSIS — D50.9 IRON DEFICIENCY ANEMIA, UNSPECIFIED IRON DEFICIENCY ANEMIA TYPE: ICD-10-CM

## 2024-01-24 DIAGNOSIS — O28.9 ABNORMAL FINDINGS ON ANTENATAL SCREENING: ICD-10-CM

## 2024-01-24 DIAGNOSIS — Z34.90 ENCOUNTER FOR INDUCTION OF LABOR: ICD-10-CM

## 2024-01-24 LAB
ABO + RH BLD: NORMAL
ALBUMIN SERPL BCP-MCNC: 3 G/DL (ref 3.5–5.2)
ALP SERPL-CCNC: 119 U/L (ref 55–135)
ALT SERPL W/O P-5'-P-CCNC: 8 U/L (ref 10–44)
ANION GAP SERPL CALC-SCNC: 9 MMOL/L (ref 8–16)
AST SERPL-CCNC: 14 U/L (ref 10–40)
BASOPHILS # BLD AUTO: 0.02 K/UL (ref 0–0.2)
BASOPHILS NFR BLD: 0.2 % (ref 0–1.9)
BILIRUB SERPL-MCNC: 0.1 MG/DL (ref 0.1–1)
BLD GP AB SCN CELLS X3 SERPL QL: NORMAL
BUN SERPL-MCNC: 7 MG/DL (ref 6–20)
CALCIUM SERPL-MCNC: 9.2 MG/DL (ref 8.7–10.5)
CHLORIDE SERPL-SCNC: 107 MMOL/L (ref 95–110)
CO2 SERPL-SCNC: 21 MMOL/L (ref 23–29)
CREAT SERPL-MCNC: 0.7 MG/DL (ref 0.5–1.4)
DIFFERENTIAL METHOD BLD: ABNORMAL
EOSINOPHIL # BLD AUTO: 0.1 K/UL (ref 0–0.5)
EOSINOPHIL NFR BLD: 0.8 % (ref 0–8)
ERYTHROCYTE [DISTWIDTH] IN BLOOD BY AUTOMATED COUNT: 16.8 % (ref 11.5–14.5)
EST. GFR  (NO RACE VARIABLE): >60 ML/MIN/1.73 M^2
ESTIMATED AVG GLUCOSE: 94 MG/DL (ref 68–131)
GLUCOSE SERPL-MCNC: 87 MG/DL (ref 70–110)
HBA1C MFR BLD: 4.9 % (ref 4–5.6)
HCT VFR BLD AUTO: 30.2 % (ref 37–48.5)
HGB BLD-MCNC: 9.4 G/DL (ref 12–16)
IMM GRANULOCYTES # BLD AUTO: 0.05 K/UL (ref 0–0.04)
IMM GRANULOCYTES NFR BLD AUTO: 0.5 % (ref 0–0.5)
LYMPHOCYTES # BLD AUTO: 2.2 K/UL (ref 1–4.8)
LYMPHOCYTES NFR BLD: 20.4 % (ref 18–48)
MCH RBC QN AUTO: 21.1 PG (ref 27–31)
MCHC RBC AUTO-ENTMCNC: 31.1 G/DL (ref 32–36)
MCV RBC AUTO: 68 FL (ref 82–98)
MONOCYTES # BLD AUTO: 0.4 K/UL (ref 0.3–1)
MONOCYTES NFR BLD: 4 % (ref 4–15)
NEUTROPHILS # BLD AUTO: 8 K/UL (ref 1.8–7.7)
NEUTROPHILS NFR BLD: 74.1 % (ref 38–73)
NRBC BLD-RTO: 0 /100 WBC
PLATELET # BLD AUTO: 333 K/UL (ref 150–450)
PMV BLD AUTO: 9.9 FL (ref 9.2–12.9)
POCT GLUCOSE: 68 MG/DL (ref 70–110)
POCT GLUCOSE: 80 MG/DL (ref 70–110)
POTASSIUM SERPL-SCNC: 3.6 MMOL/L (ref 3.5–5.1)
PROT SERPL-MCNC: 7.4 G/DL (ref 6–8.4)
RBC # BLD AUTO: 4.45 M/UL (ref 4–5.4)
RPR SER QL: REACTIVE
RPR SER-TITR: ABNORMAL {TITER}
SODIUM SERPL-SCNC: 137 MMOL/L (ref 136–145)
WBC # BLD AUTO: 10.75 K/UL (ref 3.9–12.7)

## 2024-01-24 PROCEDURE — 63600175 PHARM REV CODE 636 W HCPCS

## 2024-01-24 PROCEDURE — 85025 COMPLETE CBC W/AUTO DIFF WBC: CPT

## 2024-01-24 PROCEDURE — 59409 OBSTETRICAL CARE: CPT | Mod: AA,,, | Performed by: ANESTHESIOLOGY

## 2024-01-24 PROCEDURE — 99212 OFFICE O/P EST SF 10 MIN: CPT | Mod: S$PBB,TH,, | Performed by: OBSTETRICS & GYNECOLOGY

## 2024-01-24 PROCEDURE — 86780 TREPONEMA PALLIDUM: CPT

## 2024-01-24 PROCEDURE — 76820 UMBILICAL ARTERY ECHO: CPT | Mod: PBBFAC | Performed by: OBSTETRICS & GYNECOLOGY

## 2024-01-24 PROCEDURE — 27200710 HC EPIDURAL INFUSION PUMP SET: Performed by: ANESTHESIOLOGY

## 2024-01-24 PROCEDURE — 76819 FETAL BIOPHYS PROFIL W/O NST: CPT | Mod: PBBFAC | Performed by: OBSTETRICS & GYNECOLOGY

## 2024-01-24 PROCEDURE — 99211 OFF/OP EST MAY X REQ PHY/QHP: CPT | Mod: PBBFAC,TH,25

## 2024-01-24 PROCEDURE — 76816 OB US FOLLOW-UP PER FETUS: CPT | Mod: 26,S$PBB,, | Performed by: OBSTETRICS & GYNECOLOGY

## 2024-01-24 PROCEDURE — C1751 CATH, INF, PER/CENT/MIDLINE: HCPCS | Performed by: ANESTHESIOLOGY

## 2024-01-24 PROCEDURE — 59200 INSERT CERVICAL DILATOR: CPT

## 2024-01-24 PROCEDURE — 86592 SYPHILIS TEST NON-TREP QUAL: CPT

## 2024-01-24 PROCEDURE — 86593 SYPHILIS TEST NON-TREP QUANT: CPT

## 2024-01-24 PROCEDURE — 80053 COMPREHEN METABOLIC PANEL: CPT

## 2024-01-24 PROCEDURE — 99213 OFFICE O/P EST LOW 20 MIN: CPT | Mod: S$PBB,TH,, | Performed by: OBSTETRICS & GYNECOLOGY

## 2024-01-24 PROCEDURE — 83036 HEMOGLOBIN GLYCOSYLATED A1C: CPT

## 2024-01-24 PROCEDURE — 86850 RBC ANTIBODY SCREEN: CPT

## 2024-01-24 PROCEDURE — 76820 UMBILICAL ARTERY ECHO: CPT | Mod: 26,S$PBB,, | Performed by: OBSTETRICS & GYNECOLOGY

## 2024-01-24 PROCEDURE — 76816 OB US FOLLOW-UP PER FETUS: CPT | Mod: PBBFAC | Performed by: OBSTETRICS & GYNECOLOGY

## 2024-01-24 PROCEDURE — 99999 PR PBB SHADOW E&M-EST. PATIENT-LVL I: CPT | Mod: PBBFAC,,,

## 2024-01-24 PROCEDURE — 11000001 HC ACUTE MED/SURG PRIVATE ROOM

## 2024-01-24 PROCEDURE — 76819 FETAL BIOPHYS PROFIL W/O NST: CPT | Mod: 26,S$PBB,, | Performed by: OBSTETRICS & GYNECOLOGY

## 2024-01-24 PROCEDURE — 25000003 PHARM REV CODE 250

## 2024-01-24 RX ORDER — OXYTOCIN/RINGER'S LACTATE 30/500 ML
0-30 PLASTIC BAG, INJECTION (ML) INTRAVENOUS CONTINUOUS
Status: DISCONTINUED | OUTPATIENT
Start: 2024-01-24 | End: 2024-01-26

## 2024-01-24 RX ORDER — OXYTOCIN/RINGER'S LACTATE 30/500 ML
334 PLASTIC BAG, INJECTION (ML) INTRAVENOUS ONCE AS NEEDED
Status: DISCONTINUED | OUTPATIENT
Start: 2024-01-24 | End: 2024-01-27 | Stop reason: HOSPADM

## 2024-01-24 RX ORDER — OXYTOCIN 10 [USP'U]/ML
10 INJECTION, SOLUTION INTRAMUSCULAR; INTRAVENOUS ONCE AS NEEDED
Status: DISCONTINUED | OUTPATIENT
Start: 2024-01-24 | End: 2024-01-27 | Stop reason: HOSPADM

## 2024-01-24 RX ORDER — OXYTOCIN/RINGER'S LACTATE 30/500 ML
0-32 PLASTIC BAG, INJECTION (ML) INTRAVENOUS CONTINUOUS
Status: DISCONTINUED | OUTPATIENT
Start: 2024-01-24 | End: 2024-01-24

## 2024-01-24 RX ORDER — DIPHENOXYLATE HYDROCHLORIDE AND ATROPINE SULFATE 2.5; .025 MG/1; MG/1
2 TABLET ORAL EVERY 6 HOURS PRN
Status: DISCONTINUED | OUTPATIENT
Start: 2024-01-24 | End: 2024-01-27 | Stop reason: HOSPADM

## 2024-01-24 RX ORDER — METHYLERGONOVINE MALEATE 0.2 MG/ML
200 INJECTION INTRAVENOUS ONCE AS NEEDED
Status: DISCONTINUED | OUTPATIENT
Start: 2024-01-24 | End: 2024-01-27 | Stop reason: HOSPADM

## 2024-01-24 RX ORDER — CALCIUM CARBONATE 200(500)MG
500 TABLET,CHEWABLE ORAL 3 TIMES DAILY PRN
Status: DISCONTINUED | OUTPATIENT
Start: 2024-01-24 | End: 2024-01-27 | Stop reason: HOSPADM

## 2024-01-24 RX ORDER — MISOPROSTOL 200 UG/1
800 TABLET ORAL ONCE AS NEEDED
Status: DISCONTINUED | OUTPATIENT
Start: 2024-01-24 | End: 2024-01-27 | Stop reason: HOSPADM

## 2024-01-24 RX ORDER — OXYTOCIN/RINGER'S LACTATE 30/500 ML
95 PLASTIC BAG, INJECTION (ML) INTRAVENOUS ONCE AS NEEDED
Status: DISCONTINUED | OUTPATIENT
Start: 2024-01-24 | End: 2024-01-27 | Stop reason: HOSPADM

## 2024-01-24 RX ORDER — FENTANYL/BUPIVACAINE/NS/PF 2MCG/ML-.1
PLASTIC BAG, INJECTION (ML) INJECTION
Status: COMPLETED
Start: 2024-01-24 | End: 2024-01-25

## 2024-01-24 RX ORDER — OXYTOCIN/RINGER'S LACTATE 30/500 ML
95 PLASTIC BAG, INJECTION (ML) INTRAVENOUS ONCE
Status: DISCONTINUED | OUTPATIENT
Start: 2024-01-24 | End: 2024-01-27 | Stop reason: HOSPADM

## 2024-01-24 RX ORDER — PROCHLORPERAZINE EDISYLATE 5 MG/ML
5 INJECTION INTRAMUSCULAR; INTRAVENOUS EVERY 6 HOURS PRN
Status: DISCONTINUED | OUTPATIENT
Start: 2024-01-24 | End: 2024-01-27 | Stop reason: HOSPADM

## 2024-01-24 RX ORDER — CARBOPROST TROMETHAMINE 250 UG/ML
250 INJECTION, SOLUTION INTRAMUSCULAR
Status: DISCONTINUED | OUTPATIENT
Start: 2024-01-24 | End: 2024-01-27 | Stop reason: HOSPADM

## 2024-01-24 RX ORDER — OXYTOCIN/RINGER'S LACTATE 30/500 ML
334 PLASTIC BAG, INJECTION (ML) INTRAVENOUS ONCE
Status: DISCONTINUED | OUTPATIENT
Start: 2024-01-24 | End: 2024-01-27 | Stop reason: HOSPADM

## 2024-01-24 RX ORDER — SIMETHICONE 80 MG
1 TABLET,CHEWABLE ORAL 4 TIMES DAILY PRN
Status: DISCONTINUED | OUTPATIENT
Start: 2024-01-24 | End: 2024-01-27 | Stop reason: HOSPADM

## 2024-01-24 RX ORDER — ONDANSETRON 8 MG/1
8 TABLET, ORALLY DISINTEGRATING ORAL EVERY 8 HOURS PRN
Status: DISCONTINUED | OUTPATIENT
Start: 2024-01-24 | End: 2024-01-27 | Stop reason: HOSPADM

## 2024-01-24 RX ORDER — TRANEXAMIC ACID 10 MG/ML
1000 INJECTION, SOLUTION INTRAVENOUS EVERY 30 MIN PRN
Status: DISCONTINUED | OUTPATIENT
Start: 2024-01-24 | End: 2024-01-27 | Stop reason: HOSPADM

## 2024-01-24 RX ORDER — SODIUM CHLORIDE 9 MG/ML
INJECTION, SOLUTION INTRAVENOUS
Status: DISCONTINUED | OUTPATIENT
Start: 2024-01-24 | End: 2024-01-27 | Stop reason: HOSPADM

## 2024-01-24 RX ORDER — SODIUM CHLORIDE, SODIUM LACTATE, POTASSIUM CHLORIDE, CALCIUM CHLORIDE 600; 310; 30; 20 MG/100ML; MG/100ML; MG/100ML; MG/100ML
INJECTION, SOLUTION INTRAVENOUS CONTINUOUS
Status: DISCONTINUED | OUTPATIENT
Start: 2024-01-24 | End: 2024-01-27 | Stop reason: HOSPADM

## 2024-01-24 RX ORDER — LIDOCAINE HYDROCHLORIDE 10 MG/ML
10 INJECTION INFILTRATION; PERINEURAL ONCE AS NEEDED
Status: DISCONTINUED | OUTPATIENT
Start: 2024-01-24 | End: 2024-01-27 | Stop reason: HOSPADM

## 2024-01-24 RX ADMIN — SODIUM CHLORIDE, POTASSIUM CHLORIDE, SODIUM LACTATE AND CALCIUM CHLORIDE: 600; 310; 30; 20 INJECTION, SOLUTION INTRAVENOUS at 03:01

## 2024-01-24 RX ADMIN — DEXTROSE MONOHYDRATE 3 MILLION UNITS: 50 INJECTION, SOLUTION INTRAVENOUS at 08:01

## 2024-01-24 RX ADMIN — ONDANSETRON 8 MG: 8 TABLET, ORALLY DISINTEGRATING ORAL at 11:01

## 2024-01-24 RX ADMIN — PENICILLIN G BENZATHINE AND PENICILLIN G PROCAINE 2.4 MILLION UNITS: 600000; 600000 INJECTION, SUSPENSION INTRAMUSCULAR at 04:01

## 2024-01-24 RX ADMIN — DEXTROSE MONOHYDRATE 5 MILLION UNITS: 5 INJECTION INTRAVENOUS at 04:01

## 2024-01-24 RX ADMIN — DEXTROSE MONOHYDRATE 3 MILLION UNITS: 50 INJECTION, SOLUTION INTRAVENOUS at 11:01

## 2024-01-24 NOTE — SUBJECTIVE & OBJECTIVE
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)    OB History    Para Term  AB Living   4 3 3 0 0 3   SAB IAB Ectopic Multiple Live Births   0 0 0 0 2      # Outcome Date GA Lbr Dayron/2nd Weight Sex Delivery Anes PTL Lv   4 Current            3 Term 20 40w3d  3.09 kg (6 lb 13 oz) F Vag-Spont EPI N HENRIK      Name: CLIFF,ONEYDA OBREGON      Apgar1: 9  Apgar5: 9   2 Term 17 39w5d / 00:05 3.827 kg (8 lb 7 oz) F Vag-Spont None N HENRIK      Name: CLIFF, ONEYDA OBREGON      Apgar1: 9  Apgar5: 9   1 Term 16 41w0d  3.033 kg (6 lb 11 oz) F Vag-Spont EPI N       Name: John     Past Medical History:   Diagnosis Date    Asthma     as child only    Birth Center Risk Assessment: 0- Meets birth center guidelines 2020    Birth Center Risk Assessment: 0- Meets birth center guidelines  CNM management in ABC CNM management on L&D Consultation with OB to develop  plan of care Collaborative CNM/OB management with delivery on L&D Permanent referral of care to MD      Group B Streptococcus urinary tract infection affecting pregnancy in third trimester 2017    Herpes simplex virus (HSV) infection     Has never had a lesion. States she had a "positive blood test" during her first pregnancy,? type    Oligohydramnios in third trimester 3/4/2020    IOL at 41w2d    Pregnancy with one fetus, antepartum 10/2/2019    Prepregnancy BMI: 28 - ABC max 38# Pap: 2019 - NILM Dating - per LMP consistent with 19 wk sonogram Prenatal labs X U/S - WNL  Aneuploidy screening - negative Quad screen (AB POS) GDM screen - Passed 1 hr Vaccines - [ ]Flu [ ]TDAP GBS positive urine and was treated, then negative swab [x]Consents Contraception - Peds -  Circ -      Supervision of normal pregnancy 3/3/2020    Syncopal episodes     " Bradycardia-induced  6 months ago, one other episode as teenager (not dx with same cause)     Past Surgical History:   Procedure Laterality Date    NO PAST SURGERIES         PTA Medications   Medication Sig    acyclovir (ZOVIRAX) 400 MG tablet Take 1 tablet (400 mg total) by mouth 3 (three) times daily.    cephALEXin (KEFLEX) 500 MG capsule Take 500 mg by mouth every 6 (six) hours.    PNV no.95/ferrous fum/folic ac (PRENATAL ORAL) Take 1 tablet by mouth.       Review of patient's allergies indicates:  No Known Allergies     Family History       Problem Relation (Age of Onset)    Emphysema Paternal Grandfather    Restless legs syndrome Maternal Grandmother, Mother    Sickle cell trait Sister          Tobacco Use    Smoking status: Former     Types: Cigarettes    Smokeless tobacco: Never   Substance and Sexual Activity    Alcohol use: No     Comment: not with pregnancy    Drug use: No    Sexual activity: Yes     Partners: Male     Birth control/protection: None     Comment: Eric     Review of Systems   Eyes:  Negative for visual disturbance.   Gastrointestinal:  Negative for abdominal pain.   Genitourinary:  Negative for vaginal bleeding and vaginal discharge.   Neurological:  Negative for headaches.      Objective:     Vital Signs (Most Recent):    Vital Signs (24h Range):           There is no height or weight on file to calculate BMI.    FHT: 135 Cat 1 (reassuring)  TOCO:  Q 2-6 minutes     Physical Exam:   Constitutional: She appears well-developed.    HENT:   Head: Normocephalic.    Eyes: Conjunctivae are normal.      Pulmonary/Chest: Effort normal.        Abdominal: Soft.   gravid             Musculoskeletal: Normal range of motion.       Neurological: She is alert.    Skin: Skin is warm and dry.    Psychiatric: She has a normal mood and affect. Her behavior is normal. Judgment and thought content normal.        Cervix:  Dilation:  1  Effacement:  50%  Station: -3  Presentation: Vertex     Significant  Labs:  Lab Results   Component Value Date    GROUPTRH AB POS 11/21/2023    HEPBSAG Non-reactive 10/18/2023    STREPBCULT No Group B Streptococcus isolated 02/05/2020       I have personallly reviewed all pertinent lab results from the last 24 hours.  Recent Lab Results       None

## 2024-01-24 NOTE — PROGRESS NOTES
Bahena balloon ruptured (bulb only about 10 cc full, saline leaking out of port). Bahena reinserted with new catheter. SVE 2/40/-3. Patient continues to decline induction agent.

## 2024-01-24 NOTE — PROGRESS NOTES
Patient direct admit to labor and delivery for induction of labor. Abnormal fetal doppler studies. Not seen for OB visit .

## 2024-01-24 NOTE — ASSESSMENT & PLAN NOTE
Denies any history of outbreaks  Per MFM Dr. Martin ok that she has not taken valtrex  SSE neg on admit

## 2024-01-24 NOTE — ASSESSMENT & PLAN NOTE
Likely re-infection due to fourfold increase in titers  S/p bicillin x3, with 1 dose of bicillin retreatment regimen  Per Dr. Conner, no additional recommendations aside from:  Bicillin x1 today  Bicillin x1 1 wk postpartum  ID should be rescheduled for next week due to hospitalization  RPR, FTA on admit  Notify peds on admit

## 2024-01-24 NOTE — H&P
[facility] - Labor & Delivery  Obstetrics  History & Physical    Patient Name: Stephy Spaulding  MRN: [id number]  Admission Date: 2024  Primary Care Provider: Stephanie Li MD    Subjective:     Principal Problem:Encounter for induction of labor    History of Present Illness:  Stephy Spaulding is a 28 y.o. female  at 37w3d with Estimated Date of Delivery: 24 who presents to Labor and Delivery unaccompanied. Expecting a boy!    She presents after growth US showed FGR (14% EFW, 2712g; AC 6%) with elevated dopplers (SD 97%). [facility] recommends induction today due to elevated dopplers. Mild contractions, active fetal movement, No vaginal bleeding , No loss of fluid.     Last ate at time of presentation.    This pregnancy has been complicated by syphilis (with fourfold increase in titers due to likely reinfection), HSV seropositivity without history of outbreak, beta thalassemia minor, anemia, and GBS UTI.  Patient Active Problem List   Diagnosis    HSV-2 seropositive    Beta thalassemia minor    HSV (herpes simplex virus) anogenital infection    Iron deficiency anemia    Syncopal episodes    Ptyalism    Bacteriuria during pregnancy in first trimester    Syphilis affecting pregnancy in third trimester    Group B Streptococcus urinary tract infection affecting pregnancy in third trimester    Herpes simplex virus type 2 (HSV-2) infection affecting pregnancy in third trimester        Presentation: Cephalic by MFM US  Estimated Fetal Weight: 5 lb 13 oz    Birth Center Risk Assessment: 1-management on labor and Delivery    CNM management in ABC  CNM management on L&D  Consultation with OB to develop  plan of care  Collaborative CNM/OB management with delivery on L&D   4-   Permanent referral of care to MD      No new subjective & objective note has been filed under this hospital service since the last note was generated.      Assessment/Plan:     28 y.o. female  at 37w3d for:    * Encounter for induction of  labor  CBC, T&S, RPR with FTA, CMP, A1C on admit  BS per protocol for glucose tolerance unknown  1/40/-3 @ 1555, mendez bulb placed  Pit ordered, patient declines at this time as she delivered with just CRB last delivery    Fetal growth restriction antepartum  14% EFW (2712g)  6% AC    Group B Streptococcus urinary tract infection affecting pregnancy in third trimester  PCN per protocol    Syphilis affecting pregnancy in third trimester  Likely re-infection due to fourfold increase in titers  S/p bicillin x3, with 1 dose of bicillin retreatment regimen  Per Dr. Conner, no additional recommendations aside from:  Bicillin x1 today  Bicillin x1 1 wk postpartum  ID should be rescheduled for next week due to hospitalization  RPR, FTA on admit  Notify peds on admit      Iron deficiency anemia  CBC on admit    Beta thalassemia minor  CBC on admit    HSV-2 seropositive  Denies any history of outbreaks  Per M Dr. Martin, ok that she has not taken valtrex  SSE neg on admit      Dr. Moses updated on plan of care  Plans NCB    Usha Reynoso CNM  Obstetrics  [facility] - Labor & Delivery

## 2024-01-24 NOTE — ANESTHESIA PREPROCEDURE EVALUATION
[facility]  Anesthesia Pre-Operative Evaluation         Patient Name: Stephy Spaulding  YOB: 1995  MRN: [id number]    2024      Stephy Spaulding is a 28 y.o. female  with IUP @ 37w3d who presents for IOL. IUP is complicated by FGR, latent syphilis (s/p treatment), HSV, MARY, beta thalassemia minor, and GBS +.     Patient states that she has never had adequate relief with her previous epidurals despite hand boluses and catheter replacement.     Denies previous issues with general anesthesia.    Denies family history of issues with anesthesia.     She otherwise has no noted bleeding/clotting disorder, significant cardiopulmonary disease or spinal pathology.        OB History    Para Term  AB Living   4 3 3     3   SAB IAB Ectopic Multiple Live Births         0 2      # Outcome Date GA Lbr Dayron/2nd Weight Sex Delivery Anes PTL Lv   4 Current            3 Term 20 40w3d  3.09 kg (6 lb 13 oz) F Vag-Spont EPI N HENRIK   2 Term 17 39w5d / 00:05 3.827 kg (8 lb 7 oz) F Vag-Spont None N HENRIK   1 Term 16 41w0d  3.033 kg (6 lb 11 oz) F Vag-Spont EPI N        Review of patient's allergies indicates:  No Known Allergies    Wt Readings from Last 1 Encounters:   24 1337 88.1 kg (194 lb 3.6 oz)       BP Readings from Last 3 Encounters:   24 (!) 114/56   24 134/71   23 126/63       Patient Active Problem List   Diagnosis    HSV-2 seropositive    Beta thalassemia minor    HSV (herpes simplex virus) anogenital infection    Iron deficiency anemia    Syncopal episodes    Ptyalism    Bacteriuria during pregnancy in first trimester    Syphilis affecting pregnancy in third trimester    Group B Streptococcus urinary tract infection affecting pregnancy in third trimester    Herpes simplex virus type 2 (HSV-2) infection affecting pregnancy in third trimester       Past Surgical History:   Procedure Laterality Date    NO PAST SURGERIES         Social History  "    Socioeconomic History    Marital status: Single   Occupational History     Comment: Bartending at night   Tobacco Use    Smoking status: Former     Types: Cigarettes    Smokeless tobacco: Never   Substance and Sexual Activity    Alcohol use: No     Comment: not with pregnancy    Drug use: No    Sexual activity: Yes     Partners: Male     Birth control/protection: None     Comment: Eric   Social History Narrative    FOB Eric    Daughter [employer]    She occasionally works at [employer] as      Lives with Eric in apartment in [facility]         Social Determinants of Health     Physical Activity: Insufficiently Active (7/9/2019)    Exercise Vital Sign     Days of Exercise per Week: 3 days     Minutes of Exercise per Session: 20 min   Stress: Stress Concern Present (7/9/2019)    Citizen of [place] [employer] Occupational [employer] - Occupational Stress Questionnaire     Feeling of Stress : To some extent         Chemistry        Component Value Date/Time     11/21/2023 2144    K 3.3 (L) 11/21/2023 2144     11/21/2023 2144    CO2 17 (L) 11/21/2023 2144    BUN 4 (L) 11/21/2023 2144    CREATININE 0.7 11/21/2023 2144    GLU 83 11/21/2023 2144        Component Value Date/Time    CALCIUM 8.4 (L) 11/21/2023 2144    ALKPHOS 57 11/21/2023 2144    AST 23 11/21/2023 2144    ALT 12 11/21/2023 2144    BILITOT 0.2 11/21/2023 2144    ESTGFRAFRICA >60 02/07/2020 2320    EGFRNONAA >60 02/07/2020 2320            Lab Results   Component Value Date    WBC 7.63 11/21/2023    HGB 9.3 (L) 11/21/2023    HCT 28.7 (L) 11/21/2023    MCV 70 (L) 11/21/2023     11/21/2023       No results for input(s): "PT", "INR", "PROTIME", "APTT" in the last 72 hours.        Pre-op Assessment    I have reviewed the Patient Summary Reports.     I have reviewed the Nursing Notes. I have reviewed the NPO Status.   I have reviewed the Medications.     Review of Systems  Anesthesia Hx:  No problems with previous Anesthesia               Denies " Personal Hx of Anesthesia complications.                    Social:  Non-Smoker       Hematology/Oncology:    Oncology Normal    -- Anemia:               Hematology Comments: Beta thalassemia minor                     EENT/Dental:  EENT/Dental Normal           Cardiovascular:  Cardiovascular Normal                                            Pulmonary:  Pulmonary Normal                       Renal/:  Renal/ Normal                 Hepatic/GI:  Hepatic/GI Normal                 Musculoskeletal:  Musculoskeletal Normal                Neurological:  Neurology Normal                                      Endocrine:  Endocrine Normal            Dermatological:  Skin Normal    Psych:  Psychiatric Normal                    Physical Exam  General: Well nourished, Cooperative and Alert    Airway:  Mallampati: II   Mouth Opening: Normal  TM Distance: Normal  Tongue: Normal  Neck ROM: Normal ROM    Dental:  Intact        Anesthesia Plan  Type of Anesthesia, risks & benefits discussed:    Anesthesia Type: Gen ETT, Spinal, CSE, Epidural  Intra-op Monitoring Plan: Standard ASA Monitors  Post Op Pain Control Plan: multimodal analgesia and IV/PO Opioids PRN  Induction:  IV  Airway Plan: Video, Post-Induction  Informed Consent: Informed consent signed with the Patient and all parties understand the risks and agree with anesthesia plan.  All questions answered. Patient consented to blood products? Yes  ASA Score: 3  Day of Surgery Review of History & Physical: H&P Update referred to the surgeon/provider.I have interviewed and examined the patient. I have reviewed the patient's H&P dated:     Ready For Surgery From Anesthesia Perspective.     .

## 2024-01-24 NOTE — HPI
Stephy Spaulding is a 28 y.o. female  at 37w3d with Estimated Date of Delivery: 24 who presents to Labor and Delivery unaccompanied. Expecting a boy!    She presents after growth US showed FGR (14% EFW, 2712g; AC 6%) with elevated dopplers (SD 97%). MFM recommends induction today due to elevated dopplers. Mild contractions, active fetal movement, No vaginal bleeding , No loss of fluid.     Last ate at time of presentation.    This pregnancy has been complicated by syphilis (with fourfold increase in titers due to likely reinfection), HSV seropositivity without history of outbreak, beta thalassemia minor, anemia, and GBS UTI.  Patient Active Problem List   Diagnosis    HSV-2 seropositive    Beta thalassemia minor    HSV (herpes simplex virus) anogenital infection    Iron deficiency anemia    Syncopal episodes    Ptyalism    Bacteriuria during pregnancy in first trimester    Syphilis affecting pregnancy in third trimester    Group B Streptococcus urinary tract infection affecting pregnancy in third trimester    Herpes simplex virus type 2 (HSV-2) infection affecting pregnancy in third trimester        Presentation: Cephalic by MFM US  Estimated Fetal Weight: 5 lb 13 oz    Birth Center Risk Assessment: 1-management on labor and Delivery    CNM management in ABC  CNM management on L&D  Consultation with OB to develop  plan of care  Collaborative CNM/OB management with delivery on L&D   4-   Permanent referral of care to MD

## 2024-01-24 NOTE — HOSPITAL COURSE
SSE neg  PCN ordered for GBS prophylaxis  A1C, q4 BG in latent labor (every 2 hours in active labor) for unknown glucose tolerance; random glucose by CMP on admit  Pitocin per protocol (lose dose due to abnormal dopplers)  RPR titers with FTA antibodies on admit and Bicillin x1 per Dr. Conner, no additional recs. Bicillin in 1 week PP with ID visit next week    1/24/24 at 2015- Bahena balloon in vaginal vault/removed. SVE 3/50/-3, vtx. Declines AROM or Pit. SVE in 2-4 hours/prn. Blood glucose = 80. Continue q 4 hrs.  1/24/24 at 2300, SVE 4/50/-3, VTX. Desires Epidural then AROM.   AROM at 0100, clear. Epidural in place  1/25/24 at 0200 Irreg contractions q 8-9 minutes, start Pitocin per protocol  1/25/24 at 0400, SVE 5/50/-2, contraction q 1-4, Pit at 6 mu    1/26/24 PPD#1 doing well, normal involution, baby in NICU. Pumping for baby. Desires discharge tomorrow. Denies s/s of pre-E.  1/27/24 PPD2 - doing well today, normal lochia, pain controlled by PO meds. Denies s/s of pre-E. Visiting with baby in NICU. Discharge home today.

## 2024-01-24 NOTE — ASSESSMENT & PLAN NOTE
CBC, T&S, RPR with FTA, CMP, A1C on admit  BS per protocol for glucose tolerance unknown  1/40/-3 @ 1555, mendez bulb placed  Pit ordered, patient declines at this time as she delivered with just CRB last delivery

## 2024-01-25 PROBLEM — O13.3 GESTATIONAL HYPERTENSION WITHOUT SIGNIFICANT PROTEINURIA IN THIRD TRIMESTER: Status: ACTIVE | Noted: 2024-01-25

## 2024-01-25 LAB
POCT GLUCOSE: 70 MG/DL (ref 70–110)
POCT GLUCOSE: 72 MG/DL (ref 70–110)

## 2024-01-25 PROCEDURE — 51702 INSERT TEMP BLADDER CATH: CPT

## 2024-01-25 PROCEDURE — 11000001 HC ACUTE MED/SURG PRIVATE ROOM

## 2024-01-25 PROCEDURE — 63600175 PHARM REV CODE 636 W HCPCS

## 2024-01-25 PROCEDURE — 25000003 PHARM REV CODE 250

## 2024-01-25 PROCEDURE — 72100002 HC LABOR CARE, 1ST 8 HOURS

## 2024-01-25 PROCEDURE — 72200005 HC VAGINAL DELIVERY LEVEL II

## 2024-01-25 PROCEDURE — 59409 OBSTETRICAL CARE: CPT | Mod: AT,,, | Performed by: ADVANCED PRACTICE MIDWIFE

## 2024-01-25 PROCEDURE — 62326 NJX INTERLAMINAR LMBR/SAC: CPT

## 2024-01-25 PROCEDURE — 0HQ9XZZ REPAIR PERINEUM SKIN, EXTERNAL APPROACH: ICD-10-PCS | Performed by: OBSTETRICS & GYNECOLOGY

## 2024-01-25 PROCEDURE — 88307 TISSUE EXAM BY PATHOLOGIST: CPT | Performed by: PATHOLOGY

## 2024-01-25 PROCEDURE — 10907ZC DRAINAGE OF AMNIOTIC FLUID, THERAPEUTIC FROM PRODUCTS OF CONCEPTION, VIA NATURAL OR ARTIFICIAL OPENING: ICD-10-PCS | Performed by: OBSTETRICS & GYNECOLOGY

## 2024-01-25 PROCEDURE — 25000003 PHARM REV CODE 250: Performed by: ADVANCED PRACTICE MIDWIFE

## 2024-01-25 PROCEDURE — 88307 TISSUE EXAM BY PATHOLOGIST: CPT | Mod: 26,,, | Performed by: PATHOLOGY

## 2024-01-25 PROCEDURE — 72100003 HC LABOR CARE, EA. ADDL. 8 HRS

## 2024-01-25 PROCEDURE — 63600175 PHARM REV CODE 636 W HCPCS: Performed by: ADVANCED PRACTICE MIDWIFE

## 2024-01-25 RX ORDER — SODIUM CITRATE AND CITRIC ACID MONOHYDRATE 334; 500 MG/5ML; MG/5ML
30 SOLUTION ORAL ONCE
Status: CANCELLED | OUTPATIENT
Start: 2024-01-25 | End: 2024-01-25

## 2024-01-25 RX ORDER — FENTANYL/BUPIVACAINE/NS/PF 2MCG/ML-.1
PLASTIC BAG, INJECTION (ML) INJECTION CONTINUOUS
Status: CANCELLED | OUTPATIENT
Start: 2024-01-25

## 2024-01-25 RX ORDER — DOCUSATE SODIUM 100 MG/1
200 CAPSULE, LIQUID FILLED ORAL 2 TIMES DAILY PRN
Status: DISCONTINUED | OUTPATIENT
Start: 2024-01-25 | End: 2024-01-27 | Stop reason: HOSPADM

## 2024-01-25 RX ORDER — ONDANSETRON 8 MG/1
8 TABLET, ORALLY DISINTEGRATING ORAL EVERY 8 HOURS PRN
Status: DISCONTINUED | OUTPATIENT
Start: 2024-01-25 | End: 2024-01-27 | Stop reason: HOSPADM

## 2024-01-25 RX ORDER — FENTANYL/BUPIVACAINE/NS/PF 2MCG/ML-.1
PLASTIC BAG, INJECTION (ML) INJECTION
Status: DISCONTINUED | OUTPATIENT
Start: 2024-01-25 | End: 2024-01-25

## 2024-01-25 RX ORDER — METHYLERGONOVINE MALEATE 0.2 MG/ML
200 INJECTION INTRAVENOUS ONCE AS NEEDED
Status: DISCONTINUED | OUTPATIENT
Start: 2024-01-25 | End: 2024-01-27 | Stop reason: HOSPADM

## 2024-01-25 RX ORDER — PROCHLORPERAZINE EDISYLATE 5 MG/ML
5 INJECTION INTRAMUSCULAR; INTRAVENOUS EVERY 6 HOURS PRN
Status: DISCONTINUED | OUTPATIENT
Start: 2024-01-25 | End: 2024-01-27 | Stop reason: HOSPADM

## 2024-01-25 RX ORDER — MISOPROSTOL 200 UG/1
800 TABLET ORAL ONCE AS NEEDED
Status: DISCONTINUED | OUTPATIENT
Start: 2024-01-25 | End: 2024-01-27 | Stop reason: HOSPADM

## 2024-01-25 RX ORDER — DIPHENHYDRAMINE HCL 25 MG
25 CAPSULE ORAL EVERY 4 HOURS PRN
Status: DISCONTINUED | OUTPATIENT
Start: 2024-01-25 | End: 2024-01-27 | Stop reason: HOSPADM

## 2024-01-25 RX ORDER — SIMETHICONE 80 MG
1 TABLET,CHEWABLE ORAL EVERY 6 HOURS PRN
Status: DISCONTINUED | OUTPATIENT
Start: 2024-01-25 | End: 2024-01-27 | Stop reason: HOSPADM

## 2024-01-25 RX ORDER — OXYTOCIN 10 [USP'U]/ML
10 INJECTION, SOLUTION INTRAMUSCULAR; INTRAVENOUS ONCE AS NEEDED
Status: DISCONTINUED | OUTPATIENT
Start: 2024-01-25 | End: 2024-01-27 | Stop reason: HOSPADM

## 2024-01-25 RX ORDER — OXYTOCIN/RINGER'S LACTATE 30/500 ML
95 PLASTIC BAG, INJECTION (ML) INTRAVENOUS ONCE
Status: DISCONTINUED | OUTPATIENT
Start: 2024-01-25 | End: 2024-01-27 | Stop reason: HOSPADM

## 2024-01-25 RX ORDER — FAMOTIDINE 10 MG/ML
20 INJECTION INTRAVENOUS ONCE
Status: CANCELLED | OUTPATIENT
Start: 2024-01-25 | End: 2024-01-25

## 2024-01-25 RX ORDER — ACETAMINOPHEN 325 MG/1
650 TABLET ORAL EVERY 6 HOURS SCHEDULED
Status: DISCONTINUED | OUTPATIENT
Start: 2024-01-25 | End: 2024-01-27 | Stop reason: HOSPADM

## 2024-01-25 RX ORDER — SODIUM CHLORIDE 0.9 % (FLUSH) 0.9 %
10 SYRINGE (ML) INJECTION
Status: DISCONTINUED | OUTPATIENT
Start: 2024-01-25 | End: 2024-01-27 | Stop reason: HOSPADM

## 2024-01-25 RX ORDER — OXYTOCIN/RINGER'S LACTATE 30/500 ML
95 PLASTIC BAG, INJECTION (ML) INTRAVENOUS ONCE AS NEEDED
Status: DISCONTINUED | OUTPATIENT
Start: 2024-01-25 | End: 2024-01-27 | Stop reason: HOSPADM

## 2024-01-25 RX ORDER — HYDROCORTISONE 25 MG/G
CREAM TOPICAL 3 TIMES DAILY PRN
Status: DISCONTINUED | OUTPATIENT
Start: 2024-01-25 | End: 2024-01-27 | Stop reason: HOSPADM

## 2024-01-25 RX ORDER — DIPHENOXYLATE HYDROCHLORIDE AND ATROPINE SULFATE 2.5; .025 MG/1; MG/1
2 TABLET ORAL EVERY 6 HOURS PRN
Status: DISCONTINUED | OUTPATIENT
Start: 2024-01-25 | End: 2024-01-27 | Stop reason: HOSPADM

## 2024-01-25 RX ORDER — LIDOCAINE HYDROCHLORIDE AND EPINEPHRINE 15; 5 MG/ML; UG/ML
INJECTION, SOLUTION EPIDURAL
Status: DISCONTINUED | OUTPATIENT
Start: 2024-01-25 | End: 2024-01-25

## 2024-01-25 RX ORDER — OXYTOCIN/RINGER'S LACTATE 30/500 ML
334 PLASTIC BAG, INJECTION (ML) INTRAVENOUS ONCE AS NEEDED
Status: DISCONTINUED | OUTPATIENT
Start: 2024-01-25 | End: 2024-01-27 | Stop reason: HOSPADM

## 2024-01-25 RX ORDER — CARBOPROST TROMETHAMINE 250 UG/ML
250 INJECTION, SOLUTION INTRAMUSCULAR
Status: DISCONTINUED | OUTPATIENT
Start: 2024-01-25 | End: 2024-01-27 | Stop reason: HOSPADM

## 2024-01-25 RX ORDER — IBUPROFEN 600 MG/1
600 TABLET ORAL EVERY 6 HOURS
Status: DISCONTINUED | OUTPATIENT
Start: 2024-01-25 | End: 2024-01-27 | Stop reason: HOSPADM

## 2024-01-25 RX ORDER — PRENATAL WITH FERROUS FUM AND FOLIC ACID 3080; 920; 120; 400; 22; 1.84; 3; 20; 10; 1; 12; 200; 27; 25; 2 [IU]/1; [IU]/1; MG/1; [IU]/1; MG/1; MG/1; MG/1; MG/1; MG/1; MG/1; UG/1; MG/1; MG/1; MG/1; MG/1
1 TABLET ORAL DAILY
Status: DISCONTINUED | OUTPATIENT
Start: 2024-01-25 | End: 2024-01-27 | Stop reason: HOSPADM

## 2024-01-25 RX ORDER — TRANEXAMIC ACID 10 MG/ML
1000 INJECTION, SOLUTION INTRAVENOUS EVERY 30 MIN PRN
Status: DISCONTINUED | OUTPATIENT
Start: 2024-01-25 | End: 2024-01-27 | Stop reason: HOSPADM

## 2024-01-25 RX ORDER — DIPHENHYDRAMINE HYDROCHLORIDE 50 MG/ML
25 INJECTION INTRAMUSCULAR; INTRAVENOUS EVERY 4 HOURS PRN
Status: DISCONTINUED | OUTPATIENT
Start: 2024-01-25 | End: 2024-01-27 | Stop reason: HOSPADM

## 2024-01-25 RX ADMIN — Medication 2 MILLI-UNITS/MIN: at 02:01

## 2024-01-25 RX ADMIN — DEXTROSE MONOHYDRATE 3 MILLION UNITS: 50 INJECTION, SOLUTION INTRAVENOUS at 03:01

## 2024-01-25 RX ADMIN — ACETAMINOPHEN 650 MG: 325 TABLET, FILM COATED ORAL at 05:01

## 2024-01-25 RX ADMIN — LIDOCAINE HYDROCHLORIDE,EPINEPHRINE BITARTRATE 3 ML: 15; .005 INJECTION, SOLUTION EPIDURAL; INFILTRATION; INTRACAUDAL; PERINEURAL at 12:01

## 2024-01-25 RX ADMIN — IBUPROFEN 600 MG: 600 TABLET, FILM COATED ORAL at 11:01

## 2024-01-25 RX ADMIN — IBUPROFEN 600 MG: 600 TABLET, FILM COATED ORAL at 05:01

## 2024-01-25 RX ADMIN — DEXTROSE MONOHYDRATE 3 MILLION UNITS: 50 INJECTION, SOLUTION INTRAVENOUS at 07:01

## 2024-01-25 RX ADMIN — ACETAMINOPHEN 650 MG: 325 TABLET, FILM COATED ORAL at 11:01

## 2024-01-25 RX ADMIN — Medication 5 ML: at 12:01

## 2024-01-25 RX ADMIN — Medication 8 ML/HR: at 12:01

## 2024-01-25 NOTE — PROGRESS NOTES
"LABOR NOTE    S: Resting comfortably with epidural in place. Family at bedside.       O: BP (!) 111/56   Pulse (!) 53   Temp 97.8 °F (36.6 °C) (Oral)   Resp 18   Ht 5' 9" (1.753 m)   Wt 88 kg (194 lb)   LMP 2023 (Approximate) Comment: unsure  SpO2 100%   Breastfeeding No   BMI 28.65 kg/m²     GENERAL: Calm and appropriate affect  NEURO: Alert, oriented, normal speech  ABDOMEN: Nontender, Fundus palpates soft between UC's.  FHT: Baseline 130s, + accels. Occasional mild variable, not persistent or recurrent. Moderate variability.  Cat 1, reassuring.  CTX: q 1-4 minutes, mild to moderate/palpation.   SVE: 5/50/-2, fluid remains clear. Pitocin at 6 mu.   Blood glucose 72 wnl at 0318.      ASSESSMENT:   28 y.o.  IUP at 37w4d, FHT reassuring/ Cat 1    Patient Active Problem List   Diagnosis    HSV-2 seropositive    Beta thalassemia minor    HSV (herpes simplex virus) anogenital infection    Iron deficiency anemia    Syncopal episodes    Ptyalism    Bacteriuria during pregnancy in first trimester    Syphilis affecting pregnancy in third trimester    Group B Streptococcus urinary tract infection affecting pregnancy in third trimester    Herpes simplex virus type 2 (HSV-2) infection affecting pregnancy in third trimester    Encounter for induction of labor    Abnormal findings on  screening    Fetal growth restriction antepartum         PLAN:  Continue close Maternal/Fetal monitoring  Q 4 hour blood glucose continues (no 1 hour gtt performed antenatally), last 68 at 2319. Reinforced may have clear liquids.  Continue IV antibiotics for + GBS.   Asymptomatic Anemia, plan po Iron following delivery.  Hx HSV seropositive, no prophylaxis antenatally. No lesions. Neg SSE upon admit.  With regard to Syphillis:  RPR titer today reviewed, 1:16 decreased from 1:128 on 1/3/24. FTA IGG/IGM in process.  Bicillin x1 today-done  Ensure PEDS aware Syphillis, maternal gbs, insufficient maternal glucose " screening, fetal growth restriction, Hx maternal HSV, Anemia and Betal Thal minor.  Bicillin x1 1 wk postpartum  ID should be rescheduled for next week due to hospitalization    Continue current plan of care.  SVE in 2-4 hours prn.   Discussed with RN, please titrate Pitocin per protocol.

## 2024-01-25 NOTE — ASSESSMENT & PLAN NOTE
Likely re-infection due to fourfold increase in titers  S/p bicillin x3, with 1 dose of bicillin retreatment regimen  Per Dr. Conner, no additional recommendations aside from:  Bicillin x1 on 1/24  Bicillin x1 1 wk postpartum  ID should be rescheduled for next week due to hospitalization  RPR, FTA on admit  Notify peds on admit  1:16 on 1/24

## 2024-01-25 NOTE — PROGRESS NOTES
"LABOR NOTE    S:  Pt resting comfortably without epidural, no complaints but she does feel contractions are getting stronger.      O: /70   Pulse 62   Temp 98.8 °F (37.1 °C) (Oral)   Resp 18   Ht 5' 9" (1.753 m)   Wt 88 kg (194 lb)   LMP 2023 (Approximate) Comment: unsure  SpO2 100%   Breastfeeding No   BMI 28.65 kg/m²     GENERAL: Calm and appropriate affect  NEURO: Alert, oriented, normal speech  ABDOMEN: Nontender, Fundus palpates soft between UC's.  FHT: Baseline 130s, + accels. No decels, moderate variability.  Cat 1, reassuring.  CTX: q 3-4 minutes, mild to moderate/palpation.   SVE: 3/50/-3, VTX, Balloon in vaginal vault, removed prior to SVE.      ASSESSMENT:   28 y.o.  IUP at 37w3d, FHT reassuring/ Cat 1    Patient Active Problem List   Diagnosis    HSV-2 seropositive    Beta thalassemia minor    HSV (herpes simplex virus) anogenital infection    Iron deficiency anemia    Syncopal episodes    Ptyalism    Bacteriuria during pregnancy in first trimester    Syphilis affecting pregnancy in third trimester    Group B Streptococcus urinary tract infection affecting pregnancy in third trimester    Herpes simplex virus type 2 (HSV-2) infection affecting pregnancy in third trimester    Encounter for induction of labor    Abnormal findings on  screening    Fetal growth restriction antepartum         PLAN:  Continue close Maternal/Fetal monitoring  Q 4 hour blood glucose continues (no 1 hour gtt performed antenatally), last 80 wnl.  Continue IV antibiotics for + GBS.   Asymptomatic Anemia, plan po Iron following delivery.  Hx HSV seropositive, no prophylaxis antenatally.. No lesions. Neg SSE upon admit.  With regard to Syphillis:  RPR titer today reviewed, 1:16 decreased from 1:128 on 1/3/24. FTA IGG/IGM in process.  Bicillin x1 today-done  Ensure PEDS aware Syphillis, maternal gbs, insufficient maternal glucose screening, fetal growth restriction, Hx maternal HSV, Anemia and Betal " "Thal minor.  Bicillin x1 1 wk postpartum  ID should be rescheduled for next week due to hospitalization  Declines intervention for induction other than Bahena balloon as this method "work for my last induction".   At this time, she is making cervical change. Plan SVE in 2-4 hours or prn with change in maternal fetal status. AROM/Pitocin PRN.   Does not desire epidural/pain management.  Dr. Borden, staff ob updated on status/plan.    "

## 2024-01-25 NOTE — PLAN OF CARE
Mom VSS. Breastfeeding and formula feeding . Mom is walking and voiding . Pain control with PO pain medication and family is at the bedside .

## 2024-01-25 NOTE — L&D DELIVERY NOTE
"[facility] - Labor & Delivery  Vaginal Delivery   Operative Note    SUMMARY     Normal spontaneous vaginal delivery of live infant, was placed on mothers abdomen for skin to skin and bulb suctioning performed.  Infant delivered position VICKEY over perineum.  Nuchal cord: No.    Spontaneous delivery of placenta and IV pitocin given noting good uterine tone without bleeding.  1st degree laceration noted.  Patient tolerated delivery well. Sponge needle and lap counted correctly x2.    Indications: Encounter for induction of labor  Pregnancy complicated by:   Patient Active Problem List   Diagnosis    HSV-2 seropositive    Beta thalassemia minor    HSV (herpes simplex virus) anogenital infection    Iron deficiency anemia    Syncopal episodes    Ptyalism    Bacteriuria during pregnancy in first trimester    Syphilis affecting pregnancy in third trimester    Group B Streptococcus urinary tract infection affecting pregnancy in third trimester    Herpes simplex virus type 2 (HSV-2) infection affecting pregnancy in third trimester    Encounter for induction of labor    Abnormal findings on  screening    Fetal growth restriction antepartum     Admitting GA: 37w4d    Delivery Information for [facility]    Birth information:  YOB: 2024   Time of birth: 7:40 AM   Sex: male   Head Delivery Date/Time: 2024  7:40 AM   Delivery type: Vaginal, Spontaneous   Gestational Age: 37w4d        Delivery Providers    Delivering clinician: Bailey Raza CNM   Provider Role    Uyen Garcia, RN Nursing Student    Kartik Quarles, ADRIANNA Delivery Nurse    Violet Rivas Nurse              Measurements    Weight: 2700 g  Weight (lbs): 5 lb 15.2 oz  Length: 48.9 cm  Length (in): 19.25"  Head circumference: 31.8 cm  Chest circumference: 29.2 cm         Apgars    Living status: Living  Apgar Component Scores:  1 min.:  5 min.:  10 min.:  15 min.:  20 min.:    Skin color:  0  1       Heart rate:  2  2       Reflex " irritability:  2  2       Muscle tone:  2  2       Respiratory effort:  2  2       Total:  8  9       Apgars assigned by: PAULINE YUNG         Operative Delivery    Forceps attempted?: No  Vacuum extractor attempted?: No         Shoulder Dystocia    Shoulder dystocia present?: No           Presentation    Presentation: Vertex  Position: Right Occiput Anterior           Interventions/Resuscitation    Method: Bulb Suctioning, Tactile Stimulation       Cord    Vessels: 3 vessels  Complications: None  Delayed Cord Clamping?: Yes  Cord Clamped Date/Time: 2024  7:44 AM  Cord Blood Disposition: Sent with Baby  Gases Sent?: No  Stem Cell Collection (by MD): No       Placenta    Placenta delivery date/time: 2024 0749  Placenta removal: Spontaneous  Placenta appearance: Intact  Placenta disposition: Pathology           Labor Events:       labor: No     Labor Onset Date/Time:         Dilation Complete Date/Time: 2024 07:21     Start Pushing Date/Time: 2024 07:32       Start Pushing Date/Time: 2024 07:32     Rupture Date/Time: 24  0100         Rupture type: ARM (Artificial Rupture)         Fluid Amount:       Fluid Color: Clear               steroids:       Antibiotics given for GBS: Yes     Induction: balloon dilation ([place])     Indications for induction:  Intrauterine Growth Restriction     Augmentation: amniotomy;oxytocin     Indications for augmentation: Ineffective Contraction Pattern     Labor complications: None     Additional complications:          Cervical ripening:          [place]/EASI          Delivery:      Episiotomy: None     Indication for Episiotomy:       Perineal Lacerations: 1st Repaired:  Yes   Periurethral Laceration:   Repaired:     Labial Laceration:   Repaired:     Sulcus Laceration:   Repaired:     Vaginal Laceration:   Repaired:     Cervical Laceration:   Repaired:     Repair suture:       Repair # of packets:       Last Value - EBL - Nursing (mL):        Sum - EBL - Nursing (mL): 0     Last Value - EBL - Anesthesia (mL):      Calculated QBL (mL): 288      Vaginal Sweep Performed: Yes     Surgicount Correct: Yes     Vaginal Packing: No Quantity:       Other providers:       Anesthesia    Method: Epidural          Details (if applicable):  Trial of Labor      Categorization:      Priority:     Indications for :     Incision Type:       Additional  information:  Forceps:    Vacuum:    Breech:    Observed anomalies    Other (Comments):       Arrived to check on patient, who reported rectal pressure with contractions. SVE offered, patient agreed. Found to be 10/100/+1. Room prepared for delivery--RN, CN, and transition RN at bedside in preparation for pushing and delivery. Infant was delivered after 3 contractions with excellent maternal effort. Placed on abdomen, delayed cord clamping, clamped x 2 and cut between by FOB at bedside. Placed on maternal chest for skin to skin. Fundus found to firm and below the umbilicus. Mother and baby stable when CNM left the room.     Bailey BONILLAM/Uyen Garcia SN

## 2024-01-25 NOTE — PROGRESS NOTES
"LABOR NOTE    S: "I want to have my bag water broken but want epidural first". Feels contractions are getting stronger.       O: BP (!) 115/58   Pulse 69   Temp 98.8 °F (37.1 °C) (Oral)   Resp 18   Ht 5' 9" (1.753 m)   Wt 88 kg (194 lb)   LMP 2023 (Approximate) Comment: unsure  SpO2 100%   Breastfeeding No   BMI 28.65 kg/m²     GENERAL: Calm and appropriate affect  NEURO: Alert, oriented, normal speech  ABDOMEN: Nontender, Fundus palpates soft between UC's.  FHT: Baseline 130s, + accels. No decels, moderate variability.  Cat 1, reassuring.  CTX: irregular, mild to moderate/palpation.   SVE: 4/50/-3, VTX. No blood or fluid on glove.      ASSESSMENT:   28 y.o.  IUP at 37w3d, FHT reassuring/ Cat 1    Patient Active Problem List   Diagnosis    HSV-2 seropositive    Beta thalassemia minor    HSV (herpes simplex virus) anogenital infection    Iron deficiency anemia    Syncopal episodes    Ptyalism    Bacteriuria during pregnancy in first trimester    Syphilis affecting pregnancy in third trimester    Group B Streptococcus urinary tract infection affecting pregnancy in third trimester    Herpes simplex virus type 2 (HSV-2) infection affecting pregnancy in third trimester    Encounter for induction of labor    Abnormal findings on  screening    Fetal growth restriction antepartum         PLAN:  Continue close Maternal/Fetal monitoring  Q 4 hour blood glucose continues (no 1 hour gtt performed antenatally), last 80 wnl.  Continue IV antibiotics for + GBS.   Asymptomatic Anemia, plan po Iron following delivery.  Hx HSV seropositive, no prophylaxis antenatally.. No lesions. Neg SSE upon admit.  With regard to Syphillis:  RPR titer today reviewed, 1:16 decreased from 1:128 on 1/3/24. FTA IGG/IGM in process.  Bicillin x1 today-done  Ensure PEDS aware Syphillis, maternal gbs, insufficient maternal glucose screening, fetal growth restriction, Hx maternal HSV, Anemia and Betal Thal minor.  Bicillin x1 " 1 wk postpartum  ID should be rescheduled for next week due to hospitalization    May have epidural if desires then plan AROM.   SVE in 2-4 hours or prn with change in maternal fetal status. AROM/Pitocin PRN.     Dr. Borden, staff ob updated on status/plan.

## 2024-01-25 NOTE — PROGRESS NOTES
"LABOR NOTE    S: Resting comfortably with epidural in place. Family at bedside.       O: /77   Pulse (!) 56   Temp 97.8 °F (36.6 °C) (Oral)   Resp 18   Ht 5' 9" (1.753 m)   Wt 88 kg (194 lb)   LMP 2023 (Approximate) Comment: unsure  SpO2 100%   Breastfeeding No   BMI 28.65 kg/m²     GENERAL: Calm and appropriate affect  NEURO: Alert, oriented, normal speech  ABDOMEN: Nontender, Fundus palpates soft between UC's.  FHT: Baseline 120s-130s, + accels. No decels, moderate variability.  Cat 1, reassuring.  CTX: irregular, mild to moderate/palpation.   SVE: 4/50/-3, VTX, AROM at 0100, tolerated very well.      ASSESSMENT:   28 y.o.  IUP at 37w3d, FHT reassuring/ Cat 1    Patient Active Problem List   Diagnosis    HSV-2 seropositive    Beta thalassemia minor    HSV (herpes simplex virus) anogenital infection    Iron deficiency anemia    Syncopal episodes    Ptyalism    Bacteriuria during pregnancy in first trimester    Syphilis affecting pregnancy in third trimester    Group B Streptococcus urinary tract infection affecting pregnancy in third trimester    Herpes simplex virus type 2 (HSV-2) infection affecting pregnancy in third trimester    Encounter for induction of labor    Abnormal findings on  screening    Fetal growth restriction antepartum         PLAN:  Continue close Maternal/Fetal monitoring  Q 4 hour blood glucose continues (no 1 hour gtt performed antenatally), last 68 at 2319. Reinforced may have clear liquids.  Continue IV antibiotics for + GBS.   Asymptomatic Anemia, plan po Iron following delivery.  Hx HSV seropositive, no prophylaxis antenatally. No lesions. Neg SSE upon admit.  With regard to Syphillis:  RPR titer today reviewed, 1:16 decreased from 1:128 on 1/3/24. FTA IGG/IGM in process.  Bicillin x1 today-done  Ensure PEDS aware Syphillis, maternal gbs, insufficient maternal glucose screening, fetal growth restriction, Hx maternal HSV, Anemia and Betal Thal " minor.  Bicillin x1 1 wk postpartum  ID should be rescheduled for next week due to hospitalization    AROM clear fluid at 0100.  SVE in 2-4 hours prn. Pitocin augmentation PRN discussed with patient/agrees with.

## 2024-01-25 NOTE — ANESTHESIA PROCEDURE NOTES
Epidural    Patient location during procedure: OB   Reason for block: primary anesthetic   Reason for block: labor analgesia requested by patient and obstetrician  Diagnosis: IUP   Start time: 1/25/2024 12:02 AM  Timeout: 1/25/2024 12:00 AM  End time: 1/25/2024 12:30 AM  Surgery related to: Vaginal Delivery    Staffing  Performing Provider: Jaime Arroyo DO  Authorizing Provider: Dwayne Laguna MD    Staffing  Performed by: Jaime Arroyo DO  Authorized by: Dwayne Laguna MD        Preanesthetic Checklist  Completed: patient identified, IV checked, site marked, risks and benefits discussed, surgical consent, monitors and equipment checked, pre-op evaluation, timeout performed, anesthesia consent given, hand hygiene performed and patient being monitored  Preparation  Patient position: sitting  Prep: ChloraPrep  Patient monitoring: Pulse Ox  Reason for block: primary anesthetic   Epidural  Skin Anesthetic: lidocaine 1%  Skin Wheal: 3 mL  Administration type: continuous  Approach: midline  Interspace: L3-4    Injection technique: GERMAINE saline  Needle and Epidural Catheter  Needle type: Tuohy   Needle gauge: 17  Needle length: 3.5 inches  Needle insertion depth: 6 cm  Catheter type: [employer]  Catheter size: 19 G  Catheter at skin depth: 10 cm  Insertion Attempts: 1  Test dose: 3 mL of lidocaine 1.5% with Epi 1-to-200,000  Additional Documentation: incremental injection, negative aspiration for heme and CSF, no paresthesia on injection, no signs/symptoms of IV or SA injection, no significant pain on injection and no significant complaints from patient  Needle localization: anatomical landmarks  Medications:  Volume per aspiration: 5 mL  Time between aspirations: 5 minutes   Assessment  Ease of block: easy  Patient's tolerance of the procedure: comfortable throughout block and no complaints No inadvertent dural puncture with Tuohy.  Dural puncture performed with spinal needle.

## 2024-01-25 NOTE — LACTATION NOTE
Pt reports baby breastfeed well this morning. Pt has no questions at this time. Pt to call for lactation assistance/assessment. Lc number on whiteboard.

## 2024-01-25 NOTE — PLAN OF CARE
24 1541   OB SCREEN   Assessment Type Discharge Planning Brief Assessment   Source of Information health record   Received Prenatal Care Yes   Any indications/suspicions for None   Is this a teen pregnancy No   Is the baby in NICU No   Indication for adoption/Safe Haven No   Indication for DME/post-acute needs No   HIV (+) No   Any congenital  disorders No   Fetal demise/ death No     Patient has been screened for Social Work discharge planning needs. Based on documentation in medical record, no discharge planning needs are anticipated at this time. Should any discharge planning needs arise, please consult .

## 2024-01-25 NOTE — PLAN OF CARE
VSS. Bleeding stable. Pt able to void prior to transfer to [facility]. Questions answered, and encouraged. Report given to ADRIANNA Bar. Pt transported to [facility] with NB in arms via wheelchair.

## 2024-01-26 PROBLEM — Z34.90 ENCOUNTER FOR INDUCTION OF LABOR: Status: RESOLVED | Noted: 2024-01-24 | Resolved: 2024-01-26

## 2024-01-26 LAB
BASOPHILS # BLD AUTO: 0.03 K/UL (ref 0–0.2)
BASOPHILS NFR BLD: 0.3 % (ref 0–1.9)
DIFFERENTIAL METHOD BLD: ABNORMAL
EOSINOPHIL # BLD AUTO: 0.2 K/UL (ref 0–0.5)
EOSINOPHIL NFR BLD: 1.7 % (ref 0–8)
ERYTHROCYTE [DISTWIDTH] IN BLOOD BY AUTOMATED COUNT: 16.6 % (ref 11.5–14.5)
HCT VFR BLD AUTO: 31 % (ref 37–48.5)
HGB BLD-MCNC: 9.5 G/DL (ref 12–16)
IMM GRANULOCYTES # BLD AUTO: 0.05 K/UL (ref 0–0.04)
IMM GRANULOCYTES NFR BLD AUTO: 0.5 % (ref 0–0.5)
LYMPHOCYTES # BLD AUTO: 3.3 K/UL (ref 1–4.8)
LYMPHOCYTES NFR BLD: 30.6 % (ref 18–48)
MCH RBC QN AUTO: 21 PG (ref 27–31)
MCHC RBC AUTO-ENTMCNC: 30.6 G/DL (ref 32–36)
MCV RBC AUTO: 69 FL (ref 82–98)
MONOCYTES # BLD AUTO: 0.5 K/UL (ref 0.3–1)
MONOCYTES NFR BLD: 4.6 % (ref 4–15)
NEUTROPHILS # BLD AUTO: 6.8 K/UL (ref 1.8–7.7)
NEUTROPHILS NFR BLD: 62.3 % (ref 38–73)
NRBC BLD-RTO: 0 /100 WBC
PLATELET # BLD AUTO: 321 K/UL (ref 150–450)
PMV BLD AUTO: 9.7 FL (ref 9.2–12.9)
RBC # BLD AUTO: 4.52 M/UL (ref 4–5.4)
T PALLIDUM AB SER QL IF: REACTIVE
WBC # BLD AUTO: 10.9 K/UL (ref 3.9–12.7)

## 2024-01-26 PROCEDURE — 85025 COMPLETE CBC W/AUTO DIFF WBC: CPT | Performed by: OBSTETRICS & GYNECOLOGY

## 2024-01-26 PROCEDURE — 36415 COLL VENOUS BLD VENIPUNCTURE: CPT | Performed by: OBSTETRICS & GYNECOLOGY

## 2024-01-26 PROCEDURE — 99232 SBSQ HOSP IP/OBS MODERATE 35: CPT | Mod: ,,,

## 2024-01-26 PROCEDURE — 11000001 HC ACUTE MED/SURG PRIVATE ROOM

## 2024-01-26 PROCEDURE — 25000003 PHARM REV CODE 250: Performed by: ADVANCED PRACTICE MIDWIFE

## 2024-01-26 RX ORDER — LANOLIN ALCOHOL/MO/W.PET/CERES
1 CREAM (GRAM) TOPICAL DAILY
Status: DISCONTINUED | OUTPATIENT
Start: 2024-01-26 | End: 2024-01-27 | Stop reason: HOSPADM

## 2024-01-26 RX ADMIN — ACETAMINOPHEN 650 MG: 325 TABLET, FILM COATED ORAL at 07:01

## 2024-01-26 RX ADMIN — IBUPROFEN 600 MG: 600 TABLET, FILM COATED ORAL at 04:01

## 2024-01-26 RX ADMIN — DOCUSATE SODIUM 200 MG: 100 CAPSULE, LIQUID FILLED ORAL at 04:01

## 2024-01-26 RX ADMIN — PRENATAL VIT W/ FE FUMARATE-FA TAB 27-0.8 MG 1 TABLET: 27-0.8 TAB at 08:01

## 2024-01-26 RX ADMIN — ACETAMINOPHEN 650 MG: 325 TABLET, FILM COATED ORAL at 04:01

## 2024-01-26 RX ADMIN — IBUPROFEN 600 MG: 600 TABLET, FILM COATED ORAL at 06:01

## 2024-01-26 RX ADMIN — DOCUSATE SODIUM 200 MG: 100 CAPSULE, LIQUID FILLED ORAL at 07:01

## 2024-01-26 NOTE — ASSESSMENT & PLAN NOTE
Denies any history of outbreaks  Per MFM Dr. Martin ok that she has not taken valtrex  SSE neg on admit   Prednisone Counseling:  I discussed with the patient the risks of prolonged use of prednisone including but not limited to weight gain, insomnia, osteoporosis, mood changes, diabetes, susceptibility to infection, glaucoma and high blood pressure.  In cases where prednisone use is prolonged, patients should be monitored with blood pressure checks, serum glucose levels and an eye exam.  Additionally, the patient may need to be placed on GI prophylaxis, PCP prophylaxis, and calcium and vitamin D supplementation and/or a bisphosphonate.  The patient verbalized understanding of the proper use and the possible adverse effects of prednisone.  All of the patient's questions and concerns were addressed.

## 2024-01-26 NOTE — PLAN OF CARE
Copied from baby's chart.     SOCIAL WORK DISCHARGE PLANNING ASSESSMENT     SW completed discharge planning assessment with pt's mother in mother's room 645 .  Pt's mother was easily engaged. Education on the role of  was provided. Emotional support provided throughout assessment.     SW consult completed.      Legal Name: Daysi Valdes         :  2024  Address: [address] [place] [address] [address], [address], [place] [address]  Parent's Phone Numbers: [phone] (Stephy); [phone] (Liana)     Pediatrician:  [facility]     Education: Information given on NICU Education Classes; Physician/NNP daily rounds; and Postpartum Depression signs.   Potential Eligibility for SSI Benefits: No            Patient Active Problem List   Diagnosis    Term  delivered vaginally, current hospitalization    Congenital syphilis    Fetal growth restriction, 2,500 or more grams    Healthcare maintenance    At risk for alteration of nutrition in             [facility]irth [facility]           HAMZAH: 2024     Birth Weight:   2.7 kg (5 lb 15.2 oz)              Birth Length: 50 cm                      Gestational Age: 37w4d           Apgars    Living status: Living  Apgar Component Scores:  1 min.:  5 min.:  10 min.:  15 min.:  20 min.:    Skin color:  0  1          Heart rate:  2  2          Reflex irritability:  2  2          Muscle tone:  2  2          Respiratory effort:  2  2          Total:  8  9          Apgars assigned by: PAULINE YUNG              24 1211   NICU Assessment   Assessment Type Discharge Planning Assessment   Source of Information family   Verified Demographic and Insurance Information Yes   Insurance Medicaid   Spiritual Affiliation Other  (Spiritual)   Pastoral Care/Clergy/ Contact Status none needed   Lives With mother;father;sister;brother   Name(s) of People in Home Stephy Spaulding (MOB); Liana Valdes Jr. (FOB); Kaii (Brother); Taqueria (Sister);  P. J. (Sister)   Number people in home 6 including pt.   Relationship Status of Parents In relationship   Primary Source of Support/Comfort parent   Other children (include names and ages) Kaii-8 (Brother); Taqueria-6 (Sister); P. J.-3 (Sister)   Mother Employed No   Father's Involvement Fully Involved   Is Father signing the birth certificate Yes   Father Name and  Liana Jr. Keisha 1991   Father's Employer [employer]   Family Involvement High   Other Contacts Names and Numbers Mishel Sp[place]ding [place] [place] [phone]   Does baby have crib or safe sleep space? Yes   Do you have a car seat? Yes   Resource/Environmental Concerns none   Environment Concerns none   Resources/Education Provided [facility] Services;Preparing for Your Baby's Discharge Home;Support Resources for NICU Families;WIC;Post Partum Depression;My NICU Baby Veronica;Medicaid transportation   DME Needed Upon Discharge  none   DCFS No indications (Indicators for Report)   Discharge Plan A Home with family

## 2024-01-26 NOTE — ANESTHESIA POSTPROCEDURE EVALUATION
Anesthesia Post Evaluation    Patient: Stephy Spaulding    Procedure(s) Performed: * No procedures listed *    Final Anesthesia Type: epidural      Patient location during evaluation: labor & delivery  Patient participation: Yes- Able to Participate  Level of consciousness: awake and alert and oriented  Post-procedure vital signs: reviewed and stable  Pain management: adequate  Airway patency: patent  MAAME mitigation strategies: Multimodal analgesia and Use of major conduction anesthesia (spinal/epidural) or peripheral nerve block  PONV status at discharge: No PONV  Anesthetic complications: no      Cardiovascular status: stable and hemodynamically stable  Respiratory status: unassisted, spontaneous ventilation and room air  Hydration status: euvolemic  Follow-up not needed.              Vitals Value Taken Time   /86 01/26/24 1149   Temp 37 °C (98.6 °F) 01/26/24 1149   Pulse 80 01/26/24 1149   Resp 17 01/26/24 1149   SpO2 99 % 01/26/24 1149         No case tracking events are documented in the log.      Pain/Robb Score: Pain Rating Prior to Med Admin: 4 (1/26/2024  4:31 AM)

## 2024-01-26 NOTE — SUBJECTIVE & OBJECTIVE
Interval History:     She is doing well this morning. She is tolerating a regular diet without nausea or vomiting. She is voiding spontaneously. She is ambulating. She has passed flatus, and has not a BM. Vaginal bleeding is mild. She denies fever or chills. Abdominal pain is mild and controlled with oral medications. She Is breastfeeding. She desires circumcision for her male baby: no.    Objective:     Vital Signs (Most Recent):  Temp: 98.1 °F (36.7 °C) (01/26/24 0016)  Pulse: 66 (01/26/24 0016)  Resp: 20 (01/26/24 0016)  BP: (!) 118/57 (01/26/24 0016)  SpO2: 98 % (01/26/24 0016) Vital Signs (24h Range):  Temp:  [98.1 °F (36.7 °C)-98.6 °F (37 °C)] 98.1 °F (36.7 °C)  Pulse:  [66-81] 66  Resp:  [20] 20  SpO2:  [98 %-100 %] 98 %  BP: (102-118)/(57-58) 118/57     Weight: 88 kg (194 lb)  Body mass index is 28.65 kg/m².    No intake or output data in the 24 hours ending 01/26/24 1023      Significant Labs:  Lab Results   Component Value Date    GROUPTRH AB POS 01/24/2024    HEPBSAG Non-reactive 10/18/2023    STREPBCULT No Group B Streptococcus isolated 02/05/2020     Recent Labs   Lab 01/26/24  0631   HGB 9.5*   HCT 31.0*       I have personallly reviewed all pertinent lab results from the last 24 hours.  Recent Lab Results         01/26/24  0631        Baso # 0.03       Basophil % 0.3       Differential Method Automated       Eos # 0.2       Eosinophil % 1.7       Gran # (ANC) 6.8       Gran % 62.3       Hematocrit 31.0       Hemoglobin 9.5       Immature Grans (Abs) 0.05  Comment: Mild elevation in immature granulocytes is non specific and   can be seen in a variety of conditions including stress response,   acute inflammation, trauma and pregnancy. Correlation with other   laboratory and clinical findings is essential.         Immature Granulocytes 0.5       Lymph # 3.3       Lymph % 30.6       MCH 21.0       MCHC 30.6       MCV 69       Mono # 0.5       Mono % 4.6       MPV 9.7       nRBC 0       Platelet Count  321       RBC 4.52       RDW 16.6       WBC 10.90               Physical Exam:   Constitutional: She is oriented to person, place, and time. She appears well-developed.    HENT:   Head: Normocephalic and atraumatic.    Eyes: Conjunctivae are normal.      Pulmonary/Chest: Effort normal.        Abdominal: Soft.             Musculoskeletal: Normal range of motion.       Neurological: She is alert and oriented to person, place, and time.    Skin: Skin is warm and dry.    Psychiatric: She has a normal mood and affect. Her behavior is normal. Judgment and thought content normal.       Review of Systems   Constitutional:  Negative for fever.   Eyes:  Negative for visual disturbance.   Cardiovascular:  Negative for chest pain.   Gastrointestinal:  Positive for abdominal pain.        Cramping     Genitourinary:  Positive for vaginal bleeding. Negative for vaginal pain.        Mild lochia rubra     Neurological:  Negative for headaches.   All other systems reviewed and are negative.

## 2024-01-26 NOTE — PROGRESS NOTES
[facility])  Obstetrics  Postpartum Progress Note    Patient Name: Stephy Spaulding  MRN: [id number]  Admission Date: 2024  Hospital Length of Stay: 2 days  Attending Physician: Kavon Moses MD  Primary Care Provider: Stephanie Li MD    Subjective:     Principal Problem: (spontaneous vaginal delivery)    Hospital Course:  SSE neg  PCN ordered for GBS prophylaxis  A1C, q4 BG in latent labor (every 2 hours in active labor) for unknown glucose tolerance; random glucose by CMP on admit  Pitocin per protocol (lose dose due to abnormal dopplers)  RPR titers with FTA antibodies on admit and Bicillin x1 per Dr. Conner, no additional recs. Bicillin in 1 week PP with ID visit next week    24 at - Bahena balloon in vaginal vault/removed. SVE 3/50/-3, vtx. Declines AROM or Pit. SVE in 2-4 hours/prn. Blood glucose = 80. Continue q 4 hrs.  24 at 2300, SVE 4/50/-3, VTX. Desires Epidural then AROM.   AROM at 0100, clear. Epidural in place  24 at 0200 Irreg contractions q 8-9 minutes, start Pitocin per protocol  24 at 0400, SVE 5/50/-2, contraction q 1-4, Pit at 6 mu    24 PPD#1 doing well, normal involution, baby in NICU. Pumping for baby. Desires discharge tomorrow. Denies s/s of pre-E.    Interval History:     She is doing well this morning. She is tolerating a regular diet without nausea or vomiting. She is voiding spontaneously. She is ambulating. She has passed flatus, and has not a BM. Vaginal bleeding is mild. She denies fever or chills. Abdominal pain is mild and controlled with oral medications. She Is breastfeeding. She desires circumcision for her male baby: no.    Objective:     Vital Signs (Most Recent):  Temp: 98.1 °F (36.7 °C) (24)  Pulse: 66 (24)  Resp: 20 (24)  BP: (!) 118/57 (24)  SpO2: 98 % (24) Vital Signs (24h Range):  Temp:  [98.1 °F (36.7 °C)-98.6 °F (37 °C)] 98.1 °F (36.7 °C)  Pulse:   [66-81] 66  Resp:  [20] 20  SpO2:  [98 %-100 %] 98 %  BP: (102-118)/(57-58) 118/57     Weight: 88 kg (194 lb)  Body mass index is 28.65 kg/m².    No intake or output data in the 24 hours ending 01/26/24 1023      Significant Labs:  Lab Results   Component Value Date    GROUPTRH AB POS 01/24/2024    HEPBSAG Non-reactive 10/18/2023    STREPBCULT No Group B Streptococcus isolated 02/05/2020     Recent Labs   Lab 01/26/24  0631   HGB 9.5*   HCT 31.0*       I have personallly reviewed all pertinent lab results from the last 24 hours.  Recent Lab Results         01/26/24  0631        Baso # 0.03       Basophil % 0.3       Differential Method Automated       Eos # 0.2       Eosinophil % 1.7       Gran # (ANC) 6.8       Gran % 62.3       Hematocrit 31.0       Hemoglobin 9.5       Immature Grans (Abs) 0.05  Comment: Mild elevation in immature granulocytes is non specific and   can be seen in a variety of conditions including stress response,   acute inflammation, trauma and pregnancy. Correlation with other   laboratory and clinical findings is essential.         Immature Granulocytes 0.5       Lymph # 3.3       Lymph % 30.6       MCH 21.0       MCHC 30.6       MCV 69       Mono # 0.5       Mono % 4.6       MPV 9.7       nRBC 0       Platelet Count 321       RBC 4.52       RDW 16.6       WBC 10.90               Physical Exam:   Constitutional: She is oriented to person, place, and time. She appears well-developed.    HENT:   Head: Normocephalic and atraumatic.    Eyes: Conjunctivae are normal.      Pulmonary/Chest: Effort normal.        Abdominal: Soft.             Musculoskeletal: Normal range of motion.       Neurological: She is alert and oriented to person, place, and time.    Skin: Skin is warm and dry.    Psychiatric: She has a normal mood and affect. Her behavior is normal. Judgment and thought content normal.       Review of Systems   Constitutional:  Negative for fever.   Eyes:  Negative for visual disturbance.    Cardiovascular:  Negative for chest pain.   Gastrointestinal:  Positive for abdominal pain.        Cramping     Genitourinary:  Positive for vaginal bleeding. Negative for vaginal pain.        Mild lochia rubra     Neurological:  Negative for headaches.   All other systems reviewed and are negative.    Assessment/Plan:     28 y.o. 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 Conner, no additional recommendations aside from:  Bicillin x1 on   Bicillin x1 1 wk postpartum  ID should be rescheduled for next week due to hospitalization  RPR, FTA on admit  Notify peds on admit  1:16 on     Iron deficiency anemia  CBC on admit    Beta thalassemia minor  CBC on admit    HSV-2 seropositive  Denies any history of outbreaks  Per M Dr. Martin, ok that she has not taken valtrex  SSE neg on admit        Disposition: As patient meets milestones, will plan to discharge tomorrow on PPD#2.    Usha Reynoso CNM  Obstetrics  [facility])

## 2024-01-27 ENCOUNTER — LACTATION ENCOUNTER (OUTPATIENT)
Dept: INTENSIVE CARE | Facility: OTHER | Age: 29
End: 2024-01-27

## 2024-01-27 VITALS
DIASTOLIC BLOOD PRESSURE: 72 MMHG | WEIGHT: 194 LBS | HEART RATE: 77 BPM | SYSTOLIC BLOOD PRESSURE: 116 MMHG | TEMPERATURE: 98 F | BODY MASS INDEX: 28.73 KG/M2 | HEIGHT: 69 IN | RESPIRATION RATE: 18 BRPM | OXYGEN SATURATION: 100 %

## 2024-01-27 PROBLEM — O36.5990 FETAL GROWTH RESTRICTION ANTEPARTUM: Status: RESOLVED | Noted: 2024-01-24 | Resolved: 2024-01-27

## 2024-01-27 PROCEDURE — 99238 HOSP IP/OBS DSCHRG MGMT 30/<: CPT | Mod: ,,,

## 2024-01-27 PROCEDURE — 25000003 PHARM REV CODE 250: Performed by: ADVANCED PRACTICE MIDWIFE

## 2024-01-27 RX ORDER — FERROUS SULFATE 325(65) MG
325 TABLET, DELAYED RELEASE (ENTERIC COATED) ORAL DAILY
Qty: 30 TABLET | Refills: 0 | Status: SHIPPED | OUTPATIENT
Start: 2024-01-27

## 2024-01-27 RX ORDER — IBUPROFEN 600 MG/1
600 TABLET ORAL EVERY 6 HOURS
Qty: 30 TABLET | Refills: 0 | Status: SHIPPED | OUTPATIENT
Start: 2024-01-27

## 2024-01-27 RX ORDER — DOCUSATE SODIUM 100 MG/1
200 CAPSULE, LIQUID FILLED ORAL 2 TIMES DAILY PRN
Qty: 60 CAPSULE | Refills: 0 | Status: SHIPPED | OUTPATIENT
Start: 2024-01-27

## 2024-01-27 RX ADMIN — IBUPROFEN 600 MG: 600 TABLET, FILM COATED ORAL at 06:01

## 2024-01-27 NOTE — DISCHARGE SUMMARY
[facility] ([place])  Obstetrics  Discharge Summary      Patient Name: Stephy Spaulding  MRN: [id number]  Admission Date: 2024  Hospital Length of Stay: 3 days  Discharge Date and Time:  2024 10:08 AM  Attending Physician: Kavon Moses MD   Discharging Provider: Lulú Donis CNM   Primary Care Provider: Stephanie Li MD    HPI: Stephy Spaulding is a 28 y.o. female  at 37w3d with Estimated Date of Delivery: 24 who presents to Labor and Delivery unaccompanied. Expecting a boy!    She presents after growth US showed FGR (14% EFW, 2712g; AC 6%) with elevated dopplers (SD 97%). [facility] recommends induction today due to elevated dopplers. Mild contractions, active fetal movement, No vaginal bleeding , No loss of fluid.     Last ate at time of presentation.    This pregnancy has been complicated by syphilis (with fourfold increase in titers due to likely reinfection), HSV seropositivity without history of outbreak, beta thalassemia minor, anemia, and GBS UTI.  Patient Active Problem List   Diagnosis    HSV-2 seropositive    Beta thalassemia minor    HSV (herpes simplex virus) anogenital infection    Iron deficiency anemia    Syncopal episodes    Ptyalism    Bacteriuria during pregnancy in first trimester    Syphilis affecting pregnancy in third trimester    Group B Streptococcus urinary tract infection affecting pregnancy in third trimester    Herpes simplex virus type 2 (HSV-2) infection affecting pregnancy in third trimester        Presentation: Cephalic by M US  Estimated Fetal Weight: 5 lb 13 oz    Birth Center Risk Assessment: 1-management on labor and Delivery    CNM management in ABC  CNM management on L&D  Consultation with OB to develop  plan of care  Collaborative CNM/OB management with delivery on L&D   4-   Permanent referral of care to MD          * No surgery found *     Hospital Course:   SSE neg  PCN ordered for GBS prophylaxis  A1C, q4 BG in latent labor (every 2  hours in active labor) for unknown glucose tolerance; random glucose by CMP on admit  Pitocin per protocol (lose dose due to abnormal dopplers)  RPR titers with FTA antibodies on admit and Bicillin x1 per Dr. Conner, no additional recs. Bicillin in 1 week PP with ID visit next week    24 at 2015- Bahena balloon in vaginal vault/removed. SVE 3/50/-3, vtx. Declines AROM or Pit. SVE in 2-4 hours/prn. Blood glucose = 80. Continue q 4 hrs.  24 at 2300, SVE 4/50/-3, VTX. Desires Epidural then AROM.   AROM at 0100, clear. Epidural in place  24 at 0200 Irreg contractions q 8-9 minutes, start Pitocin per protocol  24 at 0400, SVE 5/50/-2, contraction q 1-4, Pit at 6 mu    24 PPD#1 doing well, normal involution, baby in NICU. Pumping for baby. Desires discharge tomorrow. Denies s/s of pre-E.  24 PPD2 - doing well today, normal lochia, pain controlled by PO meds. Denies s/s of pre-E. Visiting with baby in NICU. Discharge home today.     Consults (From admission, onward)          Status Ordering Provider     Inpatient consult to Anesthesiology  Once        Provider:  (Not yet assigned)    Acknowledged YAHAIRA LEWIS            Final Active Diagnoses:    Diagnosis Date Noted POA    PRINCIPAL PROBLEM:   (spontaneous vaginal delivery) [O80] 2024 Not Applicable    Gestational hypertension without significant proteinuria in third trimester [O13.3] 2024 No    Group B Streptococcus urinary tract infection affecting pregnancy in third trimester [O23.43, B95.1] 10/20/2023 Yes    Syphilis affecting pregnancy in third trimester [O98.113] 10/19/2023 Yes    Iron deficiency anemia [D50.9] 2020 Yes    HSV-2 seropositive [R76.8] 2017 Yes      Problems Resolved During this Admission:    Diagnosis Date Noted Date Resolved POA    Encounter for induction of labor [Z34.90] 2024 Not Applicable    Fetal growth restriction antepartum [O36.5990] 2024 Yes     "    Significant Diagnostic Studies: Labs: CBC   Recent Labs   Lab 24  0631   WBC 10.90   HGB 9.5*   HCT 31.0*            Feeding Method: both breast and bottle    Immunizations       Date Immunization Status Dose Route/Site Given by    24 MMR Incomplete 0.5 mL Subcutaneous/     24 Tdap Incomplete 0.5 mL Intramuscular/             Delivery:    Episiotomy: None   Lacerations: 1st   Repair suture:     Repair # of packets:     Blood loss (ml):       Birth information:  YOB: 2024   Time of birth: 7:40 AM   Sex: male   Delivery type: Vaginal, Spontaneous   Gestational Age: 37w4d     Measurements    Weight: 2700 g  Weight (lbs): 5 lb 15.2 oz  Length: 48.9 cm  Length (in): 19.25"  Head circumference: 31.8 cm  Chest circumference: 29.2 cm         Delivery Clinician: Delivery Providers    Delivering clinician: Bailey Raza CNM   Provider Role    Uyen Garcia, RN Nursing Student    Kartik Quarles RN Delivery Nurse    Violet Rivas Nurse             Additional  information:  Forceps:    Vacuum:    Breech:    Observed anomalies      Living?:     Apgars    Living status: Living  Apgar Component Scores:  1 min.:  5 min.:  10 min.:  15 min.:  20 min.:    Skin color:  0  1       Heart rate:  2  2       Reflex irritability:  2  2       Muscle tone:  2  2       Respiratory effort:  2  2       Total:  8  9       Apgars assigned by: PAULINE RIVAS         Placenta: Delivered:       appearance  Pending Diagnostic Studies:       Procedure Component Value Units Date/Time    Specimen to Pathology, Surgery Gynecology and Obstetrics [[id number]] Collected: 2448    Order Status: Sent Lab Status: In process Updated: 24    Specimen: Tissue             Discharged Condition: stable    Disposition: Home or Self Care    Follow Up:   Follow-up Information       [facility] Follow up in 1 week(s).    Specialty: Obstetrics and Gynecology  Why: BP check - can " do from home via connected moms  Contact information:  [address] 4th Floor  [facility] [address] [phone]  Additional information:  [facility] 4th Floor   Please park in [address] and use [place] elevators             [facility] Follow up in 6 week(s).    Specialty: Obstetrics and Gynecology  Why: PP Visit  Contact information:  [address] 4th Floor  [facility] [address] [phone]  Additional information:  [facility]) 4th Floor   Please park in [address] and use [facility] elevators                         Patient Instructions:      BREAST PUMP FOR HOME USE     Order Specific Question Answer Comments   Type of pump: Electric    Weight: 88 kg (194 lb)    Length of need (1-99 months): 99      Diet Adult Regular     Pelvic Rest     Notify your health care provider if you experience any of the following:  temperature >100.4     Notify your health care provider if you experience any of the following:  persistent nausea and vomiting or diarrhea     Notify your health care provider if you experience any of the following:  severe uncontrolled pain     Notify your health care provider if you experience any of the following:  redness, tenderness, or signs of infection (pain, swelling, redness, odor or green/yellow discharge around incision site)     Notify your health care provider if you experience any of the following:  difficulty breathing or increased cough     Notify your health care provider if you experience any of the following:  severe persistent headache     Notify your health care provider if you experience any of the following:  worsening rash     Notify your health care provider if you experience any of the following:  persistent dizziness, light-headedness,  or visual disturbances     Notify your health care provider if you experience any of the following:  increased confusion or weakness     Notify your health care provider if you experience any of the following:     Activity as tolerated     Medications:  Current Discharge Medication List        START taking these medications    Details   docusate sodium (COLACE) 100 MG capsule Take 2 capsules (200 mg total) by mouth 2 (two) times daily as needed for Constipation.  Qty: 60 capsule, Refills: 0      ferrous sulfate 325 (65 FE) MG EC tablet Take 1 tablet (325 mg total) by mouth once daily.  Qty: 30 tablet, Refills: 0      ibuprofen (ADVIL,MOTRIN) 600 MG tablet Take 1 tablet (600 mg total) by mouth every 6 (six) hours.  Qty: 30 tablet, Refills: 0           CONTINUE these medications which have NOT CHANGED    Details   PNV no.95/ferrous fum/folic ac (PRENATAL ORAL) Take 1 tablet by mouth.           STOP taking these medications       acyclovir (ZOVIRAX) 400 MG tablet Comments:   Reason for Stopping:         cephALEXin (KEFLEX) 500 MG capsule Comments:   Reason for Stopping:               Lulú Donis CNM  Obstetrics  [facility])

## 2024-01-27 NOTE — SUBJECTIVE & OBJECTIVE
Interval History: PPD2    She is doing well this morning. She is tolerating a regular diet without nausea or vomiting. She is voiding spontaneously. She is ambulating. She has passed flatus, and has not a BM. Vaginal bleeding is mild. She denies fever or chills. Abdominal pain is mild and controlled with oral medications. She Is breastfeeding. She desires circumcision for her male baby: no.    Objective:     Vital Signs (Most Recent):  Temp: 98.6 °F (37 °C) (01/26/24 1149)  Pulse: 80 (01/26/24 1149)  Resp: 17 (01/26/24 1149)  BP: 127/86 (01/26/24 1149)  SpO2: 99 % (01/26/24 1149) Vital Signs (24h Range):  Temp:  [98.6 °F (37 °C)] 98.6 °F (37 °C)  Pulse:  [80] 80  Resp:  [17] 17  SpO2:  [99 %] 99 %  BP: (127)/(86) 127/86     Weight: 88 kg (194 lb)  Body mass index is 28.65 kg/m².    No intake or output data in the 24 hours ending 01/27/24 1002      Significant Labs:  Lab Results   Component Value Date    GROUPTRH AB POS 01/24/2024    HEPBSAG Non-reactive 10/18/2023    STREPBCULT No Group B Streptococcus isolated 02/05/2020     Recent Labs   Lab 01/26/24  0631   HGB 9.5*   HCT 31.0*       I have personallly reviewed all pertinent lab results from the last 24 hours.    Physical Exam:   Constitutional: She is oriented to person, place, and time. She appears well-developed and well-nourished.    HENT:   Head: Normocephalic and atraumatic.    Eyes: Conjunctivae are normal.     Cardiovascular:  Normal rate.             Pulmonary/Chest: Effort normal.        Abdominal: Soft. There is no abdominal tenderness.     Genitourinary: There is vaginal discharge (lochia) in the vagina.           Musculoskeletal: Normal range of motion.       Neurological: She is alert and oriented to person, place, and time.    Skin: Skin is warm and dry.    Psychiatric: She has a normal mood and affect. Her behavior is normal. Judgment and thought content normal.       Review of Systems   Constitutional:  Negative for chills and fever.   Eyes:  Negative.    Respiratory: Negative.     Cardiovascular: Negative.    Gastrointestinal:  Positive for abdominal pain (cramping). Negative for nausea and vomiting.   Endocrine: Negative.    Genitourinary:  Positive for vaginal bleeding (lochia). Negative for vaginal odor.   Musculoskeletal: Negative.    Integumentary:  Negative.   Neurological: Negative.    Hematological: Negative.    Psychiatric/Behavioral: Negative.     Breast: negative.

## 2024-01-27 NOTE — ASSESSMENT & PLAN NOTE
1/27/24 PPD2 - doing well today, normal lochia, pain controlled by PO meds. Denies s/s of pre-E. Visiting with baby in NICU. Discharge home today.

## 2024-01-27 NOTE — PLAN OF CARE
Lactation note:  RANJANA did DC teaching for NICU mother pumping for her baby. Mother has NICU Mother's Breastfeeding Guide. Reviewed how to work pump, how to keep track of pumpings, how to label nicu breastmilk, how to clean pump parts and bring milk to NICU. Mom mostly breastfeeding and has only pumped once since delivery. Encouraged extra pumping after nursing at least 8 times daily due to early term gestation and infant being in NICU.  Mother is aware of proper techniques for transporting her breastmilk and is aware of the written instructions in her Mother's NICU Breastfeeding Guide. Mother is using a manual pump at home and is aware that she can use the [employer] breastpump at the baby's bedside when she visits.Prescription given for insurance pump and mom can obtain this Monday. Mother has the [facility] phone number and the NICU  phone number to call for further questions.

## 2024-01-27 NOTE — PROGRESS NOTES
[facility] ([place])  Obstetrics  Postpartum Progress Note    Patient Name: Stephy Spaulding  MRN: [id number]  Admission Date: 2024  Hospital Length of Stay: 3 days  Attending Physician: Kavon Moses MD  Primary Care Provider: Stephanie Li MD    Subjective:     Principal Problem: (spontaneous vaginal delivery)    Hospital Course:  SSE neg  PCN ordered for GBS prophylaxis  A1C, q4 BG in latent labor (every 2 hours in active labor) for unknown glucose tolerance; random glucose by CMP on admit  Pitocin per protocol (lose dose due to abnormal dopplers)  RPR titers with FTA antibodies on admit and Bicillin x1 per Dr. Conner, no additional recs. Bicillin in 1 week PP with ID visit next week    24 at 2015- Bahena balloon in vaginal vault/removed. SVE 3/50/-3, vtx. Declines AROM or Pit. SVE in 2-4 hours/prn. Blood glucose = 80. Continue q 4 hrs.  24 at 2300, SVE 4/50/-3, VTX. Desires Epidural then AROM.   AROM at 0100, clear. Epidural in place  24 at 0200 Irreg contractions q 8-9 minutes, start Pitocin per protocol  24 at 0400, SVE 5/50/-2, contraction q 1-4, Pit at 6 mu    24 PPD#1 doing well, normal involution, baby in NICU. Pumping for baby. Desires discharge tomorrow. Denies s/s of pre-E.  24 PPD2 - doing well today, normal lochia, pain controlled by PO meds. Denies s/s of pre-E. Visiting with baby in NICU. Discharge home today.    Interval History: PPD2    She is doing well this morning. She is tolerating a regular diet without nausea or vomiting. She is voiding spontaneously. She is ambulating. She has passed flatus, and has not a BM. Vaginal bleeding is mild. She denies fever or chills. Abdominal pain is mild and controlled with oral medications. She Is breastfeeding. She desires circumcision for her male baby: no.    Objective:     Vital Signs (Most Recent):  Temp: 98.6 °F (37 °C) (24)  Pulse: 80 (24)  Resp: 17 (24)  BP: 127/86  (24 1149)  SpO2: 99 % (24 1149) Vital Signs (24h Range):  Temp:  [98.6 °F (37 °C)] 98.6 °F (37 °C)  Pulse:  [80] 80  Resp:  [17] 17  SpO2:  [99 %] 99 %  BP: (127)/(86) 127/86     Weight: 88 kg (194 lb)  Body mass index is 28.65 kg/m².    No intake or output data in the 24 hours ending 24 1002      Significant Labs:  Lab Results   Component Value Date    GROUPTRH AB POS 2024    HEPBSAG Non-reactive 10/18/2023    STREPBCULT No Group B Streptococcus isolated 2020     Recent Labs   Lab 24  0631   HGB 9.5*   HCT 31.0*       I have personallly reviewed all pertinent lab results from the last 24 hours.    Physical Exam:   Constitutional: She is oriented to person, place, and time. She appears well-developed and well-nourished.    HENT:   Head: Normocephalic and atraumatic.    Eyes: Conjunctivae are normal.     Cardiovascular:  Normal rate.             Pulmonary/Chest: Effort normal.        Abdominal: Soft. There is no abdominal tenderness.     Genitourinary: There is vaginal discharge (lochia) in the vagina.           Musculoskeletal: Normal range of motion.       Neurological: She is alert and oriented to person, place, and time.    Skin: Skin is warm and dry.    Psychiatric: She has a normal mood and affect. Her behavior is normal. Judgment and thought content normal.       Review of Systems   Constitutional:  Negative for chills and fever.   Eyes: Negative.    Respiratory: Negative.     Cardiovascular: Negative.    Gastrointestinal:  Positive for abdominal pain (cramping). Negative for nausea and vomiting.   Endocrine: Negative.    Genitourinary:  Positive for vaginal bleeding (lochia). Negative for vaginal odor.   Musculoskeletal: Negative.    Integumentary:  Negative.   Neurological: Negative.    Hematological: Negative.    Psychiatric/Behavioral: Negative.     Breast: negative.      Assessment/Plan:     28 y.o. female  for:    *  (spontaneous vaginal delivery)  24  PPD2 - doing well today, normal lochia, pain controlled by PO meds. Denies s/s of pre-E. Visiting with baby in NICU. Discharge home today.    Gestational hypertension without significant proteinuria in third trimester  Asymptomatic  No elevated BP in 24 hours  1 week BP check - has home cuff    Fetal growth restriction antepartum  14% EFW (2712g)  6% AC    Group B Streptococcus urinary tract infection affecting pregnancy in third trimester  S/p PCN per protocol    Syphilis affecting pregnancy in third trimester  Likely re-infection due to fourfold increase in titers  S/p bicillin x3, with 1 dose of bicillin retreatment regimen  Per Dr. Conner, no additional recommendations aside from:  Bicillin x1 on 1/24  Bicillin x1 1 wk postpartum  ID should be rescheduled for next week due to hospitalization  RPR, FTA on admit  Notify peds on admit  1:16 on 1/24    Iron deficiency anemia  Iron PP    Beta thalassemia minor  CBC on admit    HSV-2 seropositive  Denies any history of outbreaks  Per M Dr. Martin, ok that she has not taken valtrex  SSE neg on admit        Disposition: As patient meets milestones, will plan to discharge today.    Lulú Donis CNM  Obstetrics  [facility] ([place])

## 2024-01-28 NOTE — PLAN OF CARE
Pt was discharged this evening per physician's order. How to Care For Yourself After Delivery booklet reviewed. Pt verbalized understanding. Pt verbalized understanding that she must follow up with her provider in the ordered amount of time. VSS. Pt was discharged in stable condition.

## 2024-01-28 NOTE — LACTATION NOTE
This note was copied from a baby's chart.  Lactation Note: Met mother at bedside; Introduced self. Mother reports breastfeeding previous 3 daughters x 2 years each. Mother breastfeeding new infant ad edelmira at bedside. Denies lactation needs at this time. Mother reports pumping after breastfeeding with increasing milk production. Pumped  60 ml/breast with last pumping session (day 2). Pumping supplies at bedside. Mother plans to get personal breast pump from [facility] on Monday. Mother staying at bedside. Praise and ongoing lactation support offered, Stephanie Sigala, BSN, RNC, CLC, IBCLC

## 2024-01-29 ENCOUNTER — PATIENT MESSAGE (OUTPATIENT)
Dept: OBSTETRICS AND GYNECOLOGY | Facility: OTHER | Age: 29
End: 2024-01-29
Payer: MEDICAID

## 2024-01-30 ENCOUNTER — LACTATION ENCOUNTER (OUTPATIENT)
Dept: INTENSIVE CARE | Facility: OTHER | Age: 29
End: 2024-01-30

## 2024-01-30 NOTE — LACTATION NOTE
This note was copied from a baby's chart.  Brief bedside contact:  Mom independently breast feeding; infant with good deep latch, good tugs/pulls and mom reports comfort. Mom reports pumping regularly with abundant supply (had to size up to 4oz bottles) with >10 full bottles in bedside fridge,praised mom. Mom denies any lactation needs. Number on dry erase board for any needs.

## 2024-01-31 ENCOUNTER — LACTATION ENCOUNTER (OUTPATIENT)
Dept: INTENSIVE CARE | Facility: OTHER | Age: 29
End: 2024-01-31

## 2024-01-31 NOTE — LACTATION NOTE
This note was copied from a baby's chart.  Mother/Baby being followed by lactation.  Bedside RN reports mother breastfeeding baby ad edelmira at bedside and baby gaining weight. Mother on phone unable to talk to mom at this time.  Stephanie Sigala, MARYLOUN, RNC, IBCLC

## 2024-02-03 ENCOUNTER — LACTATION ENCOUNTER (OUTPATIENT)
Dept: INTENSIVE CARE | Facility: OTHER | Age: 29
End: 2024-02-03

## 2024-02-03 NOTE — LACTATION NOTE
This note was copied from a baby's chart.  NICU Lactation Discharge Note:    Latch assist: Mom is completely independent with breastfeeding. She reports exclusively breast feeding her 3 other children 2-3years each/praised mom.   Discussed importance of a deep latch, signs of a good latch, signs of milk transfer, and how to know if baby is getting enough.     Feeding plan for home: Under the guidance of the Pediatrician mother to continue transition to exclusive breast feeding as desires; encouraged mother to put baby to breast on demand when early hunger cues are observed 8 or more times in 24-hour period; if signs of an effective latch and active milk transfer are noted, mother to allow baby to nurse until content; mother to continue supplement of expressed breast milk (or formula) as needed until exclusive breast feeding is well established; mother to closely monitor for signs that baby is getting enough (hydration, calories) at breast AEB at least 5-6 heavy, wet diapers/day, 3-4 loose, yellow seedy stools/day, and once birth weight is regained by day 10-14, a continued weight gain of 5-7 ounces/week; mother to follow-up with the Pediatrician for weight checks and as scheduled/needed.    Completed NICU lactation discharge teaching with good understanding verbalized by mother.  Provided mother with written handouts to reinforce verbal instructions.  Encouraged mother to participate in a breast feeding support group to facilitate meeting her breast feeding goals.  Provided mother with list of lactation community resources as well as NICU lactation contact

## 2024-02-15 ENCOUNTER — PATIENT MESSAGE (OUTPATIENT)
Dept: MATERNAL FETAL MEDICINE | Facility: CLINIC | Age: 29
End: 2024-02-15
Payer: MEDICAID

## 2024-02-15 LAB
FINAL PATHOLOGIC DIAGNOSIS: NORMAL
GROSS: NORMAL
Lab: NORMAL
SUPPLEMENTAL DIAGNOSIS: NORMAL

## 2024-04-02 ENCOUNTER — TELEPHONE (OUTPATIENT)
Dept: OBSTETRICS AND GYNECOLOGY | Facility: CLINIC | Age: 29
End: 2024-04-02
Payer: MEDICAID

## 2024-04-02 NOTE — TELEPHONE ENCOUNTER
Contacted patient regarding scheduling a well woman visit. Patient did not answer and voicemail is not set up to leave message.    Elina Sauceda MA

## 2024-04-29 PROBLEM — B95.1 GROUP B STREPTOCOCCUS URINARY TRACT INFECTION AFFECTING PREGNANCY IN THIRD TRIMESTER: Status: RESOLVED | Noted: 2023-10-20 | Resolved: 2024-04-29

## 2024-04-29 PROBLEM — O13.3 GESTATIONAL HYPERTENSION WITHOUT SIGNIFICANT PROTEINURIA IN THIRD TRIMESTER: Status: RESOLVED | Noted: 2024-01-25 | Resolved: 2024-04-29

## 2024-04-29 PROBLEM — O23.43 GROUP B STREPTOCOCCUS URINARY TRACT INFECTION AFFECTING PREGNANCY IN THIRD TRIMESTER: Status: RESOLVED | Noted: 2023-10-20 | Resolved: 2024-04-29

## 2024-11-11 ENCOUNTER — PATIENT MESSAGE (OUTPATIENT)
Dept: URGENT CARE | Facility: CLINIC | Age: 29
End: 2024-11-11
Payer: MEDICAID